# Patient Record
Sex: FEMALE | Race: BLACK OR AFRICAN AMERICAN | NOT HISPANIC OR LATINO
[De-identification: names, ages, dates, MRNs, and addresses within clinical notes are randomized per-mention and may not be internally consistent; named-entity substitution may affect disease eponyms.]

---

## 2018-06-22 ENCOUNTER — APPOINTMENT (OUTPATIENT)
Dept: UROLOGY | Facility: CLINIC | Age: 68
End: 2018-06-22
Payer: MEDICARE

## 2018-06-22 VITALS
TEMPERATURE: 97.8 F | HEART RATE: 72 BPM | RESPIRATION RATE: 16 BRPM | WEIGHT: 220 LBS | BODY MASS INDEX: 40.48 KG/M2 | OXYGEN SATURATION: 99 % | SYSTOLIC BLOOD PRESSURE: 130 MMHG | HEIGHT: 62 IN | DIASTOLIC BLOOD PRESSURE: 100 MMHG

## 2018-06-22 DIAGNOSIS — Z83.3 FAMILY HISTORY OF DIABETES MELLITUS: ICD-10-CM

## 2018-06-22 DIAGNOSIS — Z90.710 ACQUIRED ABSENCE OF BOTH CERVIX AND UTERUS: ICD-10-CM

## 2018-06-22 DIAGNOSIS — Z87.19 PERSONAL HISTORY OF OTHER DISEASES OF THE DIGESTIVE SYSTEM: ICD-10-CM

## 2018-06-22 DIAGNOSIS — Z98.891 HISTORY OF UTERINE SCAR FROM PREVIOUS SURGERY: ICD-10-CM

## 2018-06-22 DIAGNOSIS — Z87.442 PERSONAL HISTORY OF URINARY CALCULI: ICD-10-CM

## 2018-06-22 PROCEDURE — 99204 OFFICE O/P NEW MOD 45 MIN: CPT

## 2018-06-22 RX ORDER — METOPROLOL TARTRATE 50 MG/1
50 TABLET, FILM COATED ORAL
Refills: 0 | Status: ACTIVE | COMMUNITY

## 2018-06-22 RX ORDER — CLOPIDOGREL BISULFATE 75 MG/1
75 TABLET, FILM COATED ORAL
Qty: 90 | Refills: 0 | Status: ACTIVE | COMMUNITY
Start: 2017-12-29

## 2018-06-22 RX ORDER — ASPIRIN 325 MG/1
325 TABLET, FILM COATED ORAL
Refills: 0 | Status: ACTIVE | COMMUNITY

## 2018-06-22 RX ORDER — AMLODIPINE BESYLATE 10 MG/1
10 TABLET ORAL
Qty: 30 | Refills: 0 | Status: ACTIVE | COMMUNITY
Start: 2018-01-13

## 2018-06-22 RX ORDER — ATORVASTATIN CALCIUM 20 MG/1
20 TABLET, FILM COATED ORAL
Qty: 90 | Refills: 0 | Status: ACTIVE | COMMUNITY
Start: 2018-02-08

## 2018-06-22 RX ORDER — CLOPIDOGREL BISULFATE 75 MG/1
75 TABLET, FILM COATED ORAL
Refills: 0 | Status: ACTIVE | COMMUNITY

## 2018-06-22 RX ORDER — MULTIVITAMIN
TABLET ORAL
Refills: 0 | Status: ACTIVE | COMMUNITY

## 2018-06-22 RX ORDER — AMLODIPINE BESYLATE 5 MG/1
5 TABLET ORAL
Refills: 0 | Status: ACTIVE | COMMUNITY

## 2018-07-26 ENCOUNTER — OUTPATIENT (OUTPATIENT)
Dept: OUTPATIENT SERVICES | Facility: HOSPITAL | Age: 68
LOS: 1 days | End: 2018-07-26
Payer: MEDICARE

## 2018-07-26 VITALS
DIASTOLIC BLOOD PRESSURE: 83 MMHG | TEMPERATURE: 98 F | SYSTOLIC BLOOD PRESSURE: 153 MMHG | WEIGHT: 238.98 LBS | HEIGHT: 62 IN | HEART RATE: 73 BPM | OXYGEN SATURATION: 98 % | RESPIRATION RATE: 16 BRPM

## 2018-07-26 DIAGNOSIS — Z95.5 PRESENCE OF CORONARY ANGIOPLASTY IMPLANT AND GRAFT: Chronic | ICD-10-CM

## 2018-07-26 DIAGNOSIS — Z91.040 LATEX ALLERGY STATUS: ICD-10-CM

## 2018-07-26 DIAGNOSIS — Z90.710 ACQUIRED ABSENCE OF BOTH CERVIX AND UTERUS: Chronic | ICD-10-CM

## 2018-07-26 DIAGNOSIS — Z01.818 ENCOUNTER FOR OTHER PREPROCEDURAL EXAMINATION: ICD-10-CM

## 2018-07-26 DIAGNOSIS — I10 ESSENTIAL (PRIMARY) HYPERTENSION: ICD-10-CM

## 2018-07-26 DIAGNOSIS — Z90.49 ACQUIRED ABSENCE OF OTHER SPECIFIED PARTS OF DIGESTIVE TRACT: Chronic | ICD-10-CM

## 2018-07-26 DIAGNOSIS — N20.0 CALCULUS OF KIDNEY: ICD-10-CM

## 2018-07-26 DIAGNOSIS — Z87.442 PERSONAL HISTORY OF URINARY CALCULI: ICD-10-CM

## 2018-07-26 DIAGNOSIS — I25.10 ATHEROSCLEROTIC HEART DISEASE OF NATIVE CORONARY ARTERY WITHOUT ANGINA PECTORIS: ICD-10-CM

## 2018-07-26 DIAGNOSIS — Z53.1 PROCEDURE AND TREATMENT NOT CARRIED OUT BECAUSE OF PATIENT'S DECISION FOR REASONS OF BELIEF AND GROUP PRESSURE: ICD-10-CM

## 2018-07-26 DIAGNOSIS — Z98.891 HISTORY OF UTERINE SCAR FROM PREVIOUS SURGERY: Chronic | ICD-10-CM

## 2018-07-26 DIAGNOSIS — E11.9 TYPE 2 DIABETES MELLITUS WITHOUT COMPLICATIONS: ICD-10-CM

## 2018-07-26 LAB
ANION GAP SERPL CALC-SCNC: 14 MMOL/L — SIGNIFICANT CHANGE UP (ref 5–17)
BUN SERPL-MCNC: 36 MG/DL — HIGH (ref 7–23)
CALCIUM SERPL-MCNC: 10 MG/DL — SIGNIFICANT CHANGE UP (ref 8.4–10.5)
CHLORIDE SERPL-SCNC: 102 MMOL/L — SIGNIFICANT CHANGE UP (ref 96–108)
CO2 SERPL-SCNC: 24 MMOL/L — SIGNIFICANT CHANGE UP (ref 22–31)
CREAT SERPL-MCNC: 1.23 MG/DL — SIGNIFICANT CHANGE UP (ref 0.5–1.3)
GLUCOSE SERPL-MCNC: 143 MG/DL — HIGH (ref 70–99)
HBA1C BLD-MCNC: 6.9 % — HIGH (ref 4–5.6)
HCT VFR BLD CALC: 35.9 % — SIGNIFICANT CHANGE UP (ref 34.5–45)
HGB BLD-MCNC: 11.8 G/DL — SIGNIFICANT CHANGE UP (ref 11.5–15.5)
MCHC RBC-ENTMCNC: 30.3 PG — SIGNIFICANT CHANGE UP (ref 27–34)
MCHC RBC-ENTMCNC: 32.9 GM/DL — SIGNIFICANT CHANGE UP (ref 32–36)
MCV RBC AUTO: 92.1 FL — SIGNIFICANT CHANGE UP (ref 80–100)
PLATELET # BLD AUTO: 432 K/UL — HIGH (ref 150–400)
POTASSIUM SERPL-MCNC: 4 MMOL/L — SIGNIFICANT CHANGE UP (ref 3.5–5.3)
POTASSIUM SERPL-SCNC: 4 MMOL/L — SIGNIFICANT CHANGE UP (ref 3.5–5.3)
RBC # BLD: 3.9 M/UL — SIGNIFICANT CHANGE UP (ref 3.8–5.2)
RBC # FLD: 13.5 % — SIGNIFICANT CHANGE UP (ref 10.3–14.5)
SODIUM SERPL-SCNC: 140 MMOL/L — SIGNIFICANT CHANGE UP (ref 135–145)
WBC # BLD: 8.39 K/UL — SIGNIFICANT CHANGE UP (ref 3.8–10.5)
WBC # FLD AUTO: 8.39 K/UL — SIGNIFICANT CHANGE UP (ref 3.8–10.5)

## 2018-07-26 NOTE — H&P PST ADULT - PRIMARY CARE PROVIDER
[facility] ,[phone],Cardiologist DeCorby fraser [phone] In [facility] ,[phone]

## 2018-07-26 NOTE — H&P PST ADULT - PROBLEM SELECTOR PLAN 1
planned for right ureteroscopy, laser lithotripsy, stent insertion 8/2/18.  PST labs, Ucx send  Preprocedure instructions discussed

## 2018-07-26 NOTE — H&P PST ADULT - PSH
Coronary stent patent  july January 2014 Coronary stent patent  2014  H/O: hysterectomy    History of   x2  S/P laparoscopic cholecystectomy

## 2018-07-26 NOTE — H&P PST ADULT - MUSCULOSKELETAL
negative No joint pain, swelling or deformity; no limitation of movement details… detailed exam no calf tenderness/no joint swelling/no joint erythema/no joint warmth

## 2018-07-26 NOTE — H&P PST ADULT - NSANTHOSAYNRD_GEN_A_CORE
No. TIBURCIO screening performed.  STOP BANG Legend: 0-2 = LOW Risk; 3-4 = INTERMEDIATE Risk; 5-8 = HIGH Risk

## 2018-07-26 NOTE — H&P PST ADULT - PMH
Calculus of kidney    DM2 (diabetes mellitus, type 2)  no meds  Essential hypertension CAD (coronary artery disease)  s/p cardiac stent 2014  Calculus of kidney  b/l  Cardiac murmur    DM2 (diabetes mellitus, type 2)  no meds  Essential hypertension    Latex allergy

## 2018-07-26 NOTE — H&P PST ADULT - HISTORY OF PRESENT ILLNESS
67 year old female with PMH of HTN, CAD s/p cardiac stent 2014, DM2 ( diet controlled) with complaints of hematuria/ back pain found to have bilateral renal stones planned for right ureteroscopy, laser lithotripsy, stent insertion 8/2/18. ( Planned for left PCNL 9/4/18 as per Wilfredo from Dr. Morrissey office, next PST scheduled for 8/28/18).       ***** Patient is [employer], emailed Dr. Morrissey to notify.

## 2018-07-27 PROBLEM — N20.0 CALCULUS OF KIDNEY: Chronic | Status: ACTIVE | Noted: 2018-07-26

## 2018-07-27 LAB
CULTURE RESULTS: NO GROWTH — SIGNIFICANT CHANGE UP
SPECIMEN SOURCE: SIGNIFICANT CHANGE UP

## 2018-08-01 ENCOUNTER — TRANSCRIPTION ENCOUNTER (OUTPATIENT)
Age: 68
End: 2018-08-01

## 2018-08-02 ENCOUNTER — RESULT REVIEW (OUTPATIENT)
Age: 68
End: 2018-08-02

## 2018-08-02 ENCOUNTER — APPOINTMENT (OUTPATIENT)
Dept: UROLOGY | Facility: HOSPITAL | Age: 68
End: 2018-08-02

## 2018-08-02 ENCOUNTER — OUTPATIENT (OUTPATIENT)
Dept: OUTPATIENT SERVICES | Facility: HOSPITAL | Age: 68
LOS: 1 days | Discharge: HOME | End: 2018-08-02
Payer: MEDICARE

## 2018-08-02 VITALS
TEMPERATURE: 97 F | RESPIRATION RATE: 17 BRPM | DIASTOLIC BLOOD PRESSURE: 57 MMHG | HEART RATE: 73 BPM | SYSTOLIC BLOOD PRESSURE: 116 MMHG | OXYGEN SATURATION: 98 %

## 2018-08-02 VITALS
OXYGEN SATURATION: 95 % | HEART RATE: 75 BPM | TEMPERATURE: 98 F | DIASTOLIC BLOOD PRESSURE: 78 MMHG | SYSTOLIC BLOOD PRESSURE: 157 MMHG | WEIGHT: 238.98 LBS | RESPIRATION RATE: 16 BRPM | HEIGHT: 62 IN

## 2018-08-02 DIAGNOSIS — I25.10 ATHEROSCLEROTIC HEART DISEASE OF NATIVE CORONARY ARTERY WITHOUT ANGINA PECTORIS: ICD-10-CM

## 2018-08-02 DIAGNOSIS — Z87.442 PERSONAL HISTORY OF URINARY CALCULI: ICD-10-CM

## 2018-08-02 DIAGNOSIS — Z91.040 LATEX ALLERGY STATUS: ICD-10-CM

## 2018-08-02 DIAGNOSIS — I10 ESSENTIAL (PRIMARY) HYPERTENSION: ICD-10-CM

## 2018-08-02 DIAGNOSIS — N20.0 CALCULUS OF KIDNEY: ICD-10-CM

## 2018-08-02 DIAGNOSIS — Z98.61 CORONARY ANGIOPLASTY STATUS: ICD-10-CM

## 2018-08-02 DIAGNOSIS — Z01.818 ENCOUNTER FOR OTHER PREPROCEDURAL EXAMINATION: ICD-10-CM

## 2018-08-02 DIAGNOSIS — R01.1 CARDIAC MURMUR, UNSPECIFIED: ICD-10-CM

## 2018-08-02 DIAGNOSIS — Z98.891 HISTORY OF UTERINE SCAR FROM PREVIOUS SURGERY: Chronic | ICD-10-CM

## 2018-08-02 DIAGNOSIS — E11.9 TYPE 2 DIABETES MELLITUS WITHOUT COMPLICATIONS: ICD-10-CM

## 2018-08-02 DIAGNOSIS — Z87.891 PERSONAL HISTORY OF NICOTINE DEPENDENCE: ICD-10-CM

## 2018-08-02 DIAGNOSIS — Z90.49 ACQUIRED ABSENCE OF OTHER SPECIFIED PARTS OF DIGESTIVE TRACT: Chronic | ICD-10-CM

## 2018-08-02 DIAGNOSIS — Z91.048 OTHER NONMEDICINAL SUBSTANCE ALLERGY STATUS: ICD-10-CM

## 2018-08-02 DIAGNOSIS — Z90.710 ACQUIRED ABSENCE OF BOTH CERVIX AND UTERUS: Chronic | ICD-10-CM

## 2018-08-02 DIAGNOSIS — Z95.5 PRESENCE OF CORONARY ANGIOPLASTY IMPLANT AND GRAFT: Chronic | ICD-10-CM

## 2018-08-02 DIAGNOSIS — Z91.041 RADIOGRAPHIC DYE ALLERGY STATUS: ICD-10-CM

## 2018-08-02 LAB — GLUCOSE BLDC GLUCOMTR-MCNC: 146 MG/DL — HIGH (ref 70–99)

## 2018-08-02 PROCEDURE — 80048 BASIC METABOLIC PNL TOTAL CA: CPT

## 2018-08-02 PROCEDURE — 74420 UROGRAPHY RTRGR +-KUB: CPT | Mod: 26

## 2018-08-02 PROCEDURE — 83036 HEMOGLOBIN GLYCOSYLATED A1C: CPT

## 2018-08-02 PROCEDURE — G0463: CPT

## 2018-08-02 PROCEDURE — 52353 CYSTOURETERO W/LITHOTRIPSY: CPT | Mod: RT

## 2018-08-02 PROCEDURE — 76000 FLUOROSCOPY <1 HR PHYS/QHP: CPT

## 2018-08-02 PROCEDURE — 85027 COMPLETE CBC AUTOMATED: CPT

## 2018-08-02 PROCEDURE — 88300 SURGICAL PATH GROSS: CPT | Mod: 26

## 2018-08-02 PROCEDURE — 87086 URINE CULTURE/COLONY COUNT: CPT

## 2018-08-02 RX ORDER — SODIUM CHLORIDE 9 MG/ML
1000 INJECTION, SOLUTION INTRAVENOUS
Qty: 0 | Refills: 0 | Status: DISCONTINUED | OUTPATIENT
Start: 2018-08-02 | End: 2018-08-17

## 2018-08-02 RX ORDER — IBUPROFEN 200 MG
600 TABLET ORAL ONCE
Qty: 0 | Refills: 0 | Status: COMPLETED | OUTPATIENT
Start: 2018-08-02 | End: 2018-08-02

## 2018-08-02 RX ORDER — LIDOCAINE HCL 20 MG/ML
0.2 VIAL (ML) INJECTION ONCE
Qty: 0 | Refills: 0 | Status: DISCONTINUED | OUTPATIENT
Start: 2018-08-02 | End: 2018-08-02

## 2018-08-02 RX ORDER — ACETAMINOPHEN 500 MG
1000 TABLET ORAL ONCE
Qty: 0 | Refills: 0 | Status: COMPLETED | OUTPATIENT
Start: 2018-08-02 | End: 2018-08-02

## 2018-08-02 RX ORDER — ONDANSETRON 8 MG/1
4 TABLET, FILM COATED ORAL EVERY 4 HOURS
Qty: 0 | Refills: 0 | Status: DISCONTINUED | OUTPATIENT
Start: 2018-08-02 | End: 2018-08-02

## 2018-08-02 RX ORDER — SODIUM CHLORIDE 9 MG/ML
3 INJECTION INTRAMUSCULAR; INTRAVENOUS; SUBCUTANEOUS EVERY 8 HOURS
Qty: 0 | Refills: 0 | Status: DISCONTINUED | OUTPATIENT
Start: 2018-08-02 | End: 2018-08-02

## 2018-08-02 RX ORDER — FENTANYL CITRATE 50 UG/ML
25 INJECTION INTRAVENOUS
Qty: 0 | Refills: 0 | Status: DISCONTINUED | OUTPATIENT
Start: 2018-08-02 | End: 2018-08-02

## 2018-08-02 RX ORDER — CEPHALEXIN 500 MG
1 CAPSULE ORAL
Qty: 9 | Refills: 0
Start: 2018-08-02 | End: 2018-08-04

## 2018-08-02 RX ORDER — CEFAZOLIN SODIUM 1 G
2000 VIAL (EA) INJECTION ONCE
Qty: 0 | Refills: 0 | Status: DISCONTINUED | OUTPATIENT
Start: 2018-08-02 | End: 2018-08-17

## 2018-08-02 RX ORDER — ATROPA BELLADONNA AND OPIUM 16.2; 6 MG/1; MG/1
1 SUPPOSITORY RECTAL ONCE
Qty: 0 | Refills: 0 | Status: DISCONTINUED | OUTPATIENT
Start: 2018-08-02 | End: 2018-08-02

## 2018-08-02 RX ORDER — MORPHINE SULFATE 50 MG/1
2 CAPSULE, EXTENDED RELEASE ORAL ONCE
Qty: 0 | Refills: 0 | Status: DISCONTINUED | OUTPATIENT
Start: 2018-08-02 | End: 2018-08-02

## 2018-08-02 RX ORDER — KETOROLAC TROMETHAMINE 30 MG/ML
30 SYRINGE (ML) INJECTION ONCE
Qty: 0 | Refills: 0 | Status: DISCONTINUED | OUTPATIENT
Start: 2018-08-02 | End: 2018-08-02

## 2018-08-02 RX ADMIN — ATROPA BELLADONNA AND OPIUM 1 SUPPOSITORY(S): 16.2; 6 SUPPOSITORY RECTAL at 13:15

## 2018-08-02 RX ADMIN — MORPHINE SULFATE 2 MILLIGRAM(S): 50 CAPSULE, EXTENDED RELEASE ORAL at 12:23

## 2018-08-02 RX ADMIN — MORPHINE SULFATE 2 MILLIGRAM(S): 50 CAPSULE, EXTENDED RELEASE ORAL at 12:38

## 2018-08-02 RX ADMIN — ATROPA BELLADONNA AND OPIUM 1 SUPPOSITORY(S): 16.2; 6 SUPPOSITORY RECTAL at 13:02

## 2018-08-02 RX ADMIN — Medication 30 MILLIGRAM(S): at 14:46

## 2018-08-02 RX ADMIN — Medication 600 MILLIGRAM(S): at 17:15

## 2018-08-02 RX ADMIN — Medication 400 MILLIGRAM(S): at 16:30

## 2018-08-02 RX ADMIN — Medication 1000 MILLIGRAM(S): at 17:15

## 2018-08-02 RX ADMIN — FENTANYL CITRATE 25 MICROGRAM(S): 50 INJECTION INTRAVENOUS at 13:10

## 2018-08-02 RX ADMIN — Medication 600 MILLIGRAM(S): at 16:30

## 2018-08-02 RX ADMIN — FENTANYL CITRATE 25 MICROGRAM(S): 50 INJECTION INTRAVENOUS at 12:56

## 2018-08-02 RX ADMIN — Medication 30 MILLIGRAM(S): at 14:35

## 2018-08-02 NOTE — ASU DISCHARGE PLAN (ADULT/PEDIATRIC). - MEDICATION SUMMARY - MEDICATIONS TO TAKE
I will START or STAY ON the medications listed below when I get home from the hospital:    Percocet 5/325 oral tablet  -- 1 tab(s) by mouth every 6 hours, As Needed -for moderate pain - for severe pain MDD:4 tabs   -- Caution federal law prohibits the transfer of this drug to any person other  than the person for whom it was prescribed.  May cause drowsiness.  Alcohol may intensify this effect.  Use care when operating dangerous machinery.  This prescription cannot be refilled.  This product contains acetaminophen.  Do not use  with any other product containing acetaminophen to prevent possible liver damage.  Using more of this medication than prescribed may cause serious breathing problems.    -- Indication: For pain    aspirin 325 mg oral tablet  -- 1 tab(s) by mouth once a day  -- Indication: For CAD    Lipitor 20 mg oral tablet  -- orally once a day (at bedtime)  -- Indication: For HLD    Diovan  mg-25 mg oral tablet  -- 1 tab(s) by mouth once a day  -- Indication: For HTN    Metoprolol Succinate ER 50 mg oral tablet, extended release  -- 1 tab(s) by mouth once a day  -- Indication: For HTN    Norvasc 5 mg oral tablet  -- 1 tab(s) by mouth once a day  -- Indication: For HTN    Keflex 500 mg oral capsule  -- 1 cap(s) by mouth 3 times a day   -- Finish all this medication unless otherwise directed by prescriber.    -- Indication: For prophylaxis    Centrum oral tablet  -- 1 tab(s) by mouth once a day  -- Indication: For daily health    B Complex 50 oral tablet, extended release  -- 1 tab(s) by mouth once a day  -- Indication: For daily health

## 2018-08-02 NOTE — ASU DISCHARGE PLAN (ADULT/PEDIATRIC). - NOTIFY
Fever greater than 101/Persistent Nausea and Vomiting/Inability to Tolerate Liquids or Foods/Unable to Urinate/Pain not relieved by Medications/Bleeding that does not stop

## 2018-08-02 NOTE — BRIEF OPERATIVE NOTE - PROCEDURE
<<-----Click on this checkbox to enter Procedure Cystoscopy with insertion of stent if indicated, with one or more of retrograde pyelography, ureteroscopy, holmium laser lithotripsy, and basket extraction of calculus  08/02/2018    Active  [id number]

## 2018-08-02 NOTE — ASU DISCHARGE PLAN (ADULT/PEDIATRIC). - SPECIAL INSTRUCTIONS
In order to better understand your post-operative pain control, please record the following for follow-up review:          (1) Date and time of pain         (2) Level of pain (Scale of 1-10)         (3) Medication and dose taken          (4) Level of pain (1-10) after medication taken   You may receive a phone call in order to assess if you are being appropriately treated or if there are medication adjustments you may need to better control your pain.

## 2018-08-03 PROCEDURE — 76000 FLUOROSCOPY <1 HR PHYS/QHP: CPT

## 2018-08-03 PROCEDURE — 82962 GLUCOSE BLOOD TEST: CPT

## 2018-08-03 PROCEDURE — 52353 CYSTOURETERO W/LITHOTRIPSY: CPT | Mod: RT

## 2018-08-03 PROCEDURE — 82365 CALCULUS SPECTROSCOPY: CPT

## 2018-08-03 PROCEDURE — 74420 UROGRAPHY RTRGR +-KUB: CPT

## 2018-08-03 PROCEDURE — C1889: CPT

## 2018-08-03 PROCEDURE — 88300 SURGICAL PATH GROSS: CPT

## 2018-08-08 LAB — COMPN STONE: SIGNIFICANT CHANGE UP

## 2018-08-23 PROBLEM — R01.1 CARDIAC MURMUR, UNSPECIFIED: Chronic | Status: ACTIVE | Noted: 2018-07-26

## 2018-08-23 PROBLEM — Z91.040 LATEX ALLERGY STATUS: Chronic | Status: ACTIVE | Noted: 2018-07-26

## 2018-08-28 ENCOUNTER — OUTPATIENT (OUTPATIENT)
Dept: OUTPATIENT SERVICES | Facility: HOSPITAL | Age: 68
LOS: 1 days | End: 2018-08-28
Payer: MEDICARE

## 2018-08-28 VITALS
HEART RATE: 71 BPM | HEIGHT: 62 IN | TEMPERATURE: 98 F | OXYGEN SATURATION: 98 % | SYSTOLIC BLOOD PRESSURE: 138 MMHG | RESPIRATION RATE: 16 BRPM | WEIGHT: 231.93 LBS | DIASTOLIC BLOOD PRESSURE: 74 MMHG

## 2018-08-28 DIAGNOSIS — N20.0 CALCULUS OF KIDNEY: ICD-10-CM

## 2018-08-28 DIAGNOSIS — Z78.9 OTHER SPECIFIED HEALTH STATUS: ICD-10-CM

## 2018-08-28 DIAGNOSIS — E11.9 TYPE 2 DIABETES MELLITUS WITHOUT COMPLICATIONS: ICD-10-CM

## 2018-08-28 DIAGNOSIS — Z98.890 OTHER SPECIFIED POSTPROCEDURAL STATES: Chronic | ICD-10-CM

## 2018-08-28 DIAGNOSIS — Z95.5 PRESENCE OF CORONARY ANGIOPLASTY IMPLANT AND GRAFT: Chronic | ICD-10-CM

## 2018-08-28 DIAGNOSIS — I10 ESSENTIAL (PRIMARY) HYPERTENSION: ICD-10-CM

## 2018-08-28 DIAGNOSIS — Z87.442 PERSONAL HISTORY OF URINARY CALCULI: ICD-10-CM

## 2018-08-28 DIAGNOSIS — Z90.710 ACQUIRED ABSENCE OF BOTH CERVIX AND UTERUS: Chronic | ICD-10-CM

## 2018-08-28 DIAGNOSIS — Z95.5 PRESENCE OF CORONARY ANGIOPLASTY IMPLANT AND GRAFT: ICD-10-CM

## 2018-08-28 DIAGNOSIS — Z90.49 ACQUIRED ABSENCE OF OTHER SPECIFIED PARTS OF DIGESTIVE TRACT: Chronic | ICD-10-CM

## 2018-08-28 DIAGNOSIS — Z98.891 HISTORY OF UTERINE SCAR FROM PREVIOUS SURGERY: Chronic | ICD-10-CM

## 2018-08-28 DIAGNOSIS — Z01.818 ENCOUNTER FOR OTHER PREPROCEDURAL EXAMINATION: ICD-10-CM

## 2018-08-28 LAB
ANION GAP SERPL CALC-SCNC: 15 MMOL/L — SIGNIFICANT CHANGE UP (ref 5–17)
BLD GP AB SCN SERPL QL: NEGATIVE — SIGNIFICANT CHANGE UP
BUN SERPL-MCNC: 32 MG/DL — HIGH (ref 7–23)
CALCIUM SERPL-MCNC: 10.3 MG/DL — SIGNIFICANT CHANGE UP (ref 8.4–10.5)
CHLORIDE SERPL-SCNC: 100 MMOL/L — SIGNIFICANT CHANGE UP (ref 96–108)
CO2 SERPL-SCNC: 25 MMOL/L — SIGNIFICANT CHANGE UP (ref 22–31)
CREAT SERPL-MCNC: 1.43 MG/DL — HIGH (ref 0.5–1.3)
GLUCOSE SERPL-MCNC: 137 MG/DL — HIGH (ref 70–99)
HBA1C BLD-MCNC: 7 % — HIGH (ref 4–5.6)
HCT VFR BLD CALC: 36.5 % — SIGNIFICANT CHANGE UP (ref 34.5–45)
HGB BLD-MCNC: 12.1 G/DL — SIGNIFICANT CHANGE UP (ref 11.5–15.5)
MCHC RBC-ENTMCNC: 31.1 PG — SIGNIFICANT CHANGE UP (ref 27–34)
MCHC RBC-ENTMCNC: 33.2 GM/DL — SIGNIFICANT CHANGE UP (ref 32–36)
MCV RBC AUTO: 93.8 FL — SIGNIFICANT CHANGE UP (ref 80–100)
PLATELET # BLD AUTO: 399 K/UL — SIGNIFICANT CHANGE UP (ref 150–400)
POTASSIUM SERPL-MCNC: 3.8 MMOL/L — SIGNIFICANT CHANGE UP (ref 3.5–5.3)
POTASSIUM SERPL-SCNC: 3.8 MMOL/L — SIGNIFICANT CHANGE UP (ref 3.5–5.3)
RBC # BLD: 3.89 M/UL — SIGNIFICANT CHANGE UP (ref 3.8–5.2)
RBC # FLD: 13.6 % — SIGNIFICANT CHANGE UP (ref 10.3–14.5)
RH IG SCN BLD-IMP: POSITIVE — SIGNIFICANT CHANGE UP
SODIUM SERPL-SCNC: 140 MMOL/L — SIGNIFICANT CHANGE UP (ref 135–145)
WBC # BLD: 6.56 K/UL — SIGNIFICANT CHANGE UP (ref 3.8–10.5)
WBC # FLD AUTO: 6.56 K/UL — SIGNIFICANT CHANGE UP (ref 3.8–10.5)

## 2018-08-28 PROCEDURE — G0463: CPT

## 2018-08-28 PROCEDURE — 86901 BLOOD TYPING SEROLOGIC RH(D): CPT

## 2018-08-28 PROCEDURE — 83036 HEMOGLOBIN GLYCOSYLATED A1C: CPT

## 2018-08-28 PROCEDURE — 86900 BLOOD TYPING SEROLOGIC ABO: CPT

## 2018-08-28 PROCEDURE — 80048 BASIC METABOLIC PNL TOTAL CA: CPT

## 2018-08-28 PROCEDURE — 86850 RBC ANTIBODY SCREEN: CPT

## 2018-08-28 PROCEDURE — 87086 URINE CULTURE/COLONY COUNT: CPT

## 2018-08-28 PROCEDURE — 85027 COMPLETE CBC AUTOMATED: CPT

## 2018-08-28 RX ORDER — CEFAZOLIN SODIUM 1 G
2000 VIAL (EA) INJECTION ONCE
Qty: 0 | Refills: 0 | Status: COMPLETED | OUTPATIENT
Start: 2018-09-04 | End: 2018-09-04

## 2018-08-28 RX ORDER — SODIUM CHLORIDE 9 MG/ML
3 INJECTION INTRAMUSCULAR; INTRAVENOUS; SUBCUTANEOUS EVERY 8 HOURS
Qty: 0 | Refills: 0 | Status: DISCONTINUED | OUTPATIENT
Start: 2018-09-04 | End: 2018-09-04

## 2018-08-28 NOTE — H&P PST ADULT - PSH
Coronary stent patent  2014   LOY X1  H/O: hysterectomy  1988    MITCHELL and unilateral SO ( benign)  History of   x2     and   S/P laparoscopic cholecystectomy  2001 Coronary stent patent  2014   LOY X1  H/O lithotripsy  18  Right Ureteroscopy/ Laser Lithotripsy/ Stent Insertion  H/O: hysterectomy      MITCHELL and unilateral SO ( benign)  History of   x2     and   S/P laparoscopic cholecystectomy  2001

## 2018-08-28 NOTE — H&P PST ADULT - PROBLEM SELECTOR PLAN 5
s/p ( '15): Coronary(LOY) X1. currently on  mg. daily  PLAN: remain on ASA for coronary stent protection

## 2018-08-28 NOTE — H&P PST ADULT - PMH
CAD (coronary artery disease)  s/p cardiac stent 2014 X 1 ( LOY) @   Calculus of kidney  b/l  Cardiac murmur    DM2 (diabetes mellitus, type 2)  Off Metformin since '2017. Now diet- managed  Fibroid, uterine  ' 88   surgically treated; benign  Heart murmur  mild  Hyperlipidemia    Hypertension    Latex allergy CAD (coronary artery disease)  s/p cardiac stent 2014 X 1 ( LOY) @ [facility]  Calculus of kidney  b/l  Cardiac murmur    Carotid artery plaque, bilateral  mild  DM2 (diabetes mellitus, type 2)  Off Metformin since '2017. Now diet- managed  Fibroid, uterine  ' 88   surgically treated; benign  Heart murmur  mild  Hyperlipidemia    Hypertension    Latex allergy    Morbid obesity  BMI  42.4

## 2018-08-28 NOTE — H&P PST ADULT - HISTORY OF PRESENT ILLNESS
67 year old female with PMH of HTN, CAD s/p cardiac stent 2014, DM2 ( diet controlled) with complaints of hematuria/ back pain found to have bilateral renal stones planned for right ureteroscopy, laser lithotripsy, stent insertion 8/2/18. ( Planned for left PCNL 9/4/18 as per Wilfredo from Dr. Morrissey office, next PST scheduled for 8/28/18).       ***** Patient is [employer], emailed Dr. Morrissey to notify. This is a 69 y/o female with PMH: Morbid Obesity: BMI 42.4, HTN, HLD, Type 2 Diabetes: currently diet-managed, CAD: (s/p)'15: Coronary ( LOY) X1: currently on  mg. daily * to remain on ASA for coronary Stent protection, bilateral Kidney stones: (s/p): 8-2-18:  right ureteroscopy, laser lithotripsy, stent insertion. Curren dx: Left Kidney stone. Scheduled: Left Percutaneous Nephrostolithotomy.       ***** Patient is [employer], emailed Dr. Morrissey to notify.

## 2018-08-29 PROBLEM — I10 ESSENTIAL (PRIMARY) HYPERTENSION: Chronic | Status: INACTIVE | Noted: 2018-07-26 | Resolved: 2018-08-28

## 2018-08-29 PROBLEM — I10 ESSENTIAL (PRIMARY) HYPERTENSION: Chronic | Status: ACTIVE | Noted: 2018-08-28

## 2018-08-29 PROBLEM — E78.5 HYPERLIPIDEMIA, UNSPECIFIED: Chronic | Status: ACTIVE | Noted: 2018-08-28

## 2018-08-29 PROBLEM — I25.10 ATHEROSCLEROTIC HEART DISEASE OF NATIVE CORONARY ARTERY WITHOUT ANGINA PECTORIS: Chronic | Status: ACTIVE | Noted: 2018-07-26

## 2018-08-29 PROBLEM — D25.9 LEIOMYOMA OF UTERUS, UNSPECIFIED: Chronic | Status: ACTIVE | Noted: 2018-08-28

## 2018-08-29 PROBLEM — E11.9 TYPE 2 DIABETES MELLITUS WITHOUT COMPLICATIONS: Chronic | Status: ACTIVE | Noted: 2018-07-26

## 2018-08-29 LAB
CULTURE RESULTS: NO GROWTH — SIGNIFICANT CHANGE UP
SPECIMEN SOURCE: SIGNIFICANT CHANGE UP

## 2018-09-03 ENCOUNTER — TRANSCRIPTION ENCOUNTER (OUTPATIENT)
Age: 68
End: 2018-09-03

## 2018-09-04 ENCOUNTER — RESULT REVIEW (OUTPATIENT)
Age: 68
End: 2018-09-04

## 2018-09-04 ENCOUNTER — APPOINTMENT (OUTPATIENT)
Dept: UROLOGY | Facility: HOSPITAL | Age: 68
End: 2018-09-04

## 2018-09-04 ENCOUNTER — INPATIENT (INPATIENT)
Facility: HOSPITAL | Age: 68
LOS: 1 days | Discharge: HOME | DRG: 660 | End: 2018-09-06
Attending: UROLOGY | Admitting: UROLOGY
Payer: MEDICARE

## 2018-09-04 VITALS
SYSTOLIC BLOOD PRESSURE: 152 MMHG | HEART RATE: 69 BPM | HEIGHT: 62 IN | DIASTOLIC BLOOD PRESSURE: 95 MMHG | TEMPERATURE: 98 F | OXYGEN SATURATION: 98 % | RESPIRATION RATE: 18 BRPM | WEIGHT: 231.93 LBS

## 2018-09-04 DIAGNOSIS — Z87.442 PERSONAL HISTORY OF URINARY CALCULI: ICD-10-CM

## 2018-09-04 DIAGNOSIS — Z90.49 ACQUIRED ABSENCE OF OTHER SPECIFIED PARTS OF DIGESTIVE TRACT: Chronic | ICD-10-CM

## 2018-09-04 DIAGNOSIS — Z98.891 HISTORY OF UTERINE SCAR FROM PREVIOUS SURGERY: Chronic | ICD-10-CM

## 2018-09-04 DIAGNOSIS — Z90.710 ACQUIRED ABSENCE OF BOTH CERVIX AND UTERUS: Chronic | ICD-10-CM

## 2018-09-04 DIAGNOSIS — Z01.818 ENCOUNTER FOR OTHER PREPROCEDURAL EXAMINATION: ICD-10-CM

## 2018-09-04 DIAGNOSIS — Z95.5 PRESENCE OF CORONARY ANGIOPLASTY IMPLANT AND GRAFT: Chronic | ICD-10-CM

## 2018-09-04 DIAGNOSIS — Z98.890 OTHER SPECIFIED POSTPROCEDURAL STATES: Chronic | ICD-10-CM

## 2018-09-04 LAB
ANION GAP SERPL CALC-SCNC: 14 MMOL/L — SIGNIFICANT CHANGE UP (ref 5–17)
BASOPHILS # BLD AUTO: 0.1 K/UL — SIGNIFICANT CHANGE UP (ref 0–0.2)
BASOPHILS NFR BLD AUTO: 1 % — SIGNIFICANT CHANGE UP (ref 0–2)
BUN SERPL-MCNC: 26 MG/DL — HIGH (ref 7–23)
CALCIUM SERPL-MCNC: 9.4 MG/DL — SIGNIFICANT CHANGE UP (ref 8.4–10.5)
CHLORIDE SERPL-SCNC: 99 MMOL/L — SIGNIFICANT CHANGE UP (ref 96–108)
CO2 SERPL-SCNC: 25 MMOL/L — SIGNIFICANT CHANGE UP (ref 22–31)
CREAT SERPL-MCNC: 1.31 MG/DL — HIGH (ref 0.5–1.3)
EOSINOPHIL # BLD AUTO: 0.4 K/UL — SIGNIFICANT CHANGE UP (ref 0–0.5)
EOSINOPHIL NFR BLD AUTO: 3.9 % — SIGNIFICANT CHANGE UP (ref 0–6)
GLUCOSE BLDC GLUCOMTR-MCNC: 123 MG/DL — HIGH (ref 70–99)
GLUCOSE SERPL-MCNC: 159 MG/DL — HIGH (ref 70–99)
HCT VFR BLD CALC: 32.2 % — LOW (ref 34.5–45)
HGB BLD-MCNC: 10.8 G/DL — LOW (ref 11.5–15.5)
LYMPHOCYTES # BLD AUTO: 3.2 K/UL — SIGNIFICANT CHANGE UP (ref 1–3.3)
LYMPHOCYTES # BLD AUTO: 30.7 % — SIGNIFICANT CHANGE UP (ref 13–44)
MCHC RBC-ENTMCNC: 31.1 PG — SIGNIFICANT CHANGE UP (ref 27–34)
MCHC RBC-ENTMCNC: 33.7 GM/DL — SIGNIFICANT CHANGE UP (ref 32–36)
MCV RBC AUTO: 92.3 FL — SIGNIFICANT CHANGE UP (ref 80–100)
MONOCYTES # BLD AUTO: 0.6 K/UL — SIGNIFICANT CHANGE UP (ref 0–0.9)
MONOCYTES NFR BLD AUTO: 5.7 % — SIGNIFICANT CHANGE UP (ref 2–14)
NEUTROPHILS # BLD AUTO: 6.1 K/UL — SIGNIFICANT CHANGE UP (ref 1.8–7.4)
NEUTROPHILS NFR BLD AUTO: 58.7 % — SIGNIFICANT CHANGE UP (ref 43–77)
PLATELET # BLD AUTO: 323 K/UL — SIGNIFICANT CHANGE UP (ref 150–400)
POTASSIUM SERPL-MCNC: 4 MMOL/L — SIGNIFICANT CHANGE UP (ref 3.5–5.3)
POTASSIUM SERPL-SCNC: 4 MMOL/L — SIGNIFICANT CHANGE UP (ref 3.5–5.3)
RBC # BLD: 3.49 M/UL — LOW (ref 3.8–5.2)
RBC # FLD: 11.9 % — SIGNIFICANT CHANGE UP (ref 10.3–14.5)
SODIUM SERPL-SCNC: 138 MMOL/L — SIGNIFICANT CHANGE UP (ref 135–145)
WBC # BLD: 10.4 K/UL — SIGNIFICANT CHANGE UP (ref 3.8–10.5)
WBC # FLD AUTO: 10.4 K/UL — SIGNIFICANT CHANGE UP (ref 3.8–10.5)

## 2018-09-04 PROCEDURE — 88300 SURGICAL PATH GROSS: CPT | Mod: 26

## 2018-09-04 RX ORDER — OXYBUTYNIN CHLORIDE 5 MG
5 TABLET ORAL ONCE
Qty: 0 | Refills: 0 | Status: COMPLETED | OUTPATIENT
Start: 2018-09-04 | End: 2018-09-04

## 2018-09-04 RX ORDER — CEFAZOLIN SODIUM 1 G
1000 VIAL (EA) INJECTION EVERY 8 HOURS
Qty: 0 | Refills: 0 | Status: DISCONTINUED | OUTPATIENT
Start: 2018-09-04 | End: 2018-09-06

## 2018-09-04 RX ORDER — ASPIRIN/CALCIUM CARB/MAGNESIUM 324 MG
325 TABLET ORAL ONCE
Qty: 0 | Refills: 0 | Status: COMPLETED | OUTPATIENT
Start: 2018-09-04 | End: 2018-09-04

## 2018-09-04 RX ORDER — HYDROMORPHONE HYDROCHLORIDE 2 MG/ML
0.5 INJECTION INTRAMUSCULAR; INTRAVENOUS; SUBCUTANEOUS EVERY 4 HOURS
Qty: 0 | Refills: 0 | Status: DISCONTINUED | OUTPATIENT
Start: 2018-09-04 | End: 2018-09-05

## 2018-09-04 RX ORDER — ONDANSETRON 8 MG/1
4 TABLET, FILM COATED ORAL ONCE
Qty: 0 | Refills: 0 | Status: DISCONTINUED | OUTPATIENT
Start: 2018-09-04 | End: 2018-09-04

## 2018-09-04 RX ORDER — HEPARIN SODIUM 5000 [USP'U]/ML
5000 INJECTION INTRAVENOUS; SUBCUTANEOUS EVERY 8 HOURS
Qty: 0 | Refills: 0 | Status: DISCONTINUED | OUTPATIENT
Start: 2018-09-04 | End: 2018-09-06

## 2018-09-04 RX ORDER — SODIUM CHLORIDE 9 MG/ML
1000 INJECTION, SOLUTION INTRAVENOUS
Qty: 0 | Refills: 0 | Status: DISCONTINUED | OUTPATIENT
Start: 2018-09-04 | End: 2018-09-05

## 2018-09-04 RX ORDER — OXYCODONE AND ACETAMINOPHEN 5; 325 MG/1; MG/1
1 TABLET ORAL EVERY 4 HOURS
Qty: 0 | Refills: 0 | Status: DISCONTINUED | OUTPATIENT
Start: 2018-09-04 | End: 2018-09-06

## 2018-09-04 RX ORDER — HYDROCHLOROTHIAZIDE 25 MG
25 TABLET ORAL DAILY
Qty: 0 | Refills: 0 | Status: DISCONTINUED | OUTPATIENT
Start: 2018-09-04 | End: 2018-09-06

## 2018-09-04 RX ORDER — ATROPA BELLADONNA AND OPIUM 16.2; 6 MG/1; MG/1
1 SUPPOSITORY RECTAL ONCE
Qty: 0 | Refills: 0 | Status: DISCONTINUED | OUTPATIENT
Start: 2018-09-04 | End: 2018-09-04

## 2018-09-04 RX ORDER — HYDROMORPHONE HYDROCHLORIDE 2 MG/ML
0.25 INJECTION INTRAMUSCULAR; INTRAVENOUS; SUBCUTANEOUS
Qty: 0 | Refills: 0 | Status: DISCONTINUED | OUTPATIENT
Start: 2018-09-04 | End: 2018-09-04

## 2018-09-04 RX ORDER — ASPIRIN/CALCIUM CARB/MAGNESIUM 324 MG
325 TABLET ORAL DAILY
Qty: 0 | Refills: 0 | Status: DISCONTINUED | OUTPATIENT
Start: 2018-09-04 | End: 2018-09-06

## 2018-09-04 RX ORDER — METOPROLOL TARTRATE 50 MG
50 TABLET ORAL DAILY
Qty: 0 | Refills: 0 | Status: DISCONTINUED | OUTPATIENT
Start: 2018-09-04 | End: 2018-09-06

## 2018-09-04 RX ORDER — ACETAMINOPHEN 500 MG
500 TABLET ORAL EVERY 6 HOURS
Qty: 0 | Refills: 0 | Status: DISCONTINUED | OUTPATIENT
Start: 2018-09-04 | End: 2018-09-06

## 2018-09-04 RX ORDER — VALSARTAN 80 MG/1
320 TABLET ORAL DAILY
Qty: 0 | Refills: 0 | Status: DISCONTINUED | OUTPATIENT
Start: 2018-09-04 | End: 2018-09-06

## 2018-09-04 RX ORDER — HYDROMORPHONE HYDROCHLORIDE 2 MG/ML
0.5 INJECTION INTRAMUSCULAR; INTRAVENOUS; SUBCUTANEOUS ONCE
Qty: 0 | Refills: 0 | Status: DISCONTINUED | OUTPATIENT
Start: 2018-09-04 | End: 2018-09-04

## 2018-09-04 RX ORDER — OXYCODONE AND ACETAMINOPHEN 5; 325 MG/1; MG/1
2 TABLET ORAL EVERY 6 HOURS
Qty: 0 | Refills: 0 | Status: DISCONTINUED | OUTPATIENT
Start: 2018-09-04 | End: 2018-09-06

## 2018-09-04 RX ORDER — ATORVASTATIN CALCIUM 80 MG/1
20 TABLET, FILM COATED ORAL AT BEDTIME
Qty: 0 | Refills: 0 | Status: DISCONTINUED | OUTPATIENT
Start: 2018-09-04 | End: 2018-09-06

## 2018-09-04 RX ORDER — SENNA PLUS 8.6 MG/1
2 TABLET ORAL AT BEDTIME
Qty: 0 | Refills: 0 | Status: DISCONTINUED | OUTPATIENT
Start: 2018-09-04 | End: 2018-09-06

## 2018-09-04 RX ORDER — AMLODIPINE BESYLATE 2.5 MG/1
5 TABLET ORAL DAILY
Qty: 0 | Refills: 0 | Status: DISCONTINUED | OUTPATIENT
Start: 2018-09-04 | End: 2018-09-06

## 2018-09-04 RX ADMIN — ATORVASTATIN CALCIUM 20 MILLIGRAM(S): 80 TABLET, FILM COATED ORAL at 22:46

## 2018-09-04 RX ADMIN — Medication 100 MILLIGRAM(S): at 08:50

## 2018-09-04 RX ADMIN — HYDROMORPHONE HYDROCHLORIDE 0.5 MILLIGRAM(S): 2 INJECTION INTRAMUSCULAR; INTRAVENOUS; SUBCUTANEOUS at 20:20

## 2018-09-04 RX ADMIN — Medication 5 MILLIGRAM(S): at 15:08

## 2018-09-04 RX ADMIN — SODIUM CHLORIDE 125 MILLILITER(S): 9 INJECTION, SOLUTION INTRAVENOUS at 11:42

## 2018-09-04 RX ADMIN — HEPARIN SODIUM 5000 UNIT(S): 5000 INJECTION INTRAVENOUS; SUBCUTANEOUS at 22:46

## 2018-09-04 RX ADMIN — ATROPA BELLADONNA AND OPIUM 1 SUPPOSITORY(S): 16.2; 6 SUPPOSITORY RECTAL at 13:25

## 2018-09-04 RX ADMIN — Medication 100 MILLIGRAM(S): at 16:02

## 2018-09-04 RX ADMIN — Medication 325 MILLIGRAM(S): at 08:30

## 2018-09-04 RX ADMIN — OXYCODONE AND ACETAMINOPHEN 1 TABLET(S): 5; 325 TABLET ORAL at 13:59

## 2018-09-04 RX ADMIN — OXYCODONE AND ACETAMINOPHEN 1 TABLET(S): 5; 325 TABLET ORAL at 15:00

## 2018-09-04 RX ADMIN — HYDROMORPHONE HYDROCHLORIDE 0.5 MILLIGRAM(S): 2 INJECTION INTRAMUSCULAR; INTRAVENOUS; SUBCUTANEOUS at 16:15

## 2018-09-04 RX ADMIN — HYDROMORPHONE HYDROCHLORIDE 0.5 MILLIGRAM(S): 2 INJECTION INTRAMUSCULAR; INTRAVENOUS; SUBCUTANEOUS at 19:50

## 2018-09-04 RX ADMIN — HEPARIN SODIUM 5000 UNIT(S): 5000 INJECTION INTRAVENOUS; SUBCUTANEOUS at 14:03

## 2018-09-04 RX ADMIN — Medication 100 MILLIGRAM(S): at 22:45

## 2018-09-04 RX ADMIN — HYDROMORPHONE HYDROCHLORIDE 0.5 MILLIGRAM(S): 2 INJECTION INTRAMUSCULAR; INTRAVENOUS; SUBCUTANEOUS at 16:02

## 2018-09-04 RX ADMIN — ATROPA BELLADONNA AND OPIUM 1 SUPPOSITORY(S): 16.2; 6 SUPPOSITORY RECTAL at 14:03

## 2018-09-04 NOTE — BRIEF OPERATIVE NOTE - PROCEDURE
<<-----Click on this checkbox to enter Procedure Cystoscopy  09/04/2018  with left ureteral catheter insertion, retrograde pyelogram  Active  [email or web address]  Percutaneous nephrolithotomy  09/04/2018  left, antegrade nephrostogram, placement of re-entry nephroureteral catheter  Active  [id number]

## 2018-09-04 NOTE — PROGRESS NOTE ADULT - SUBJECTIVE AND OBJECTIVE BOX
Patient is high-risk for bleeding and sepsis based on status as Quaker and adverse to blood transfusion as well as frailty (R54) and obesity (E66.01). Patient to be admitted for monitoring.

## 2018-09-04 NOTE — PROGRESS NOTE ADULT - SUBJECTIVE AND OBJECTIVE BOX
Post op Check    Pt seen and examined without any major complaints. minor suprapubic discomfort, but Pain is controlled. Denies SOB/CP/N/V.     Vital Signs Last 24 Hrs  T(C): 36.4 (04 Sep 2018 12:00), Max: 36.6 (04 Sep 2018 07:07)  T(F): 97.5 (04 Sep 2018 12:00), Max: 97.9 (04 Sep 2018 07:07)  HR: 61 (04 Sep 2018 12:00) (60 - 81)  BP: 139/66 (04 Sep 2018 12:00) (133/56 - 152/95)  BP(mean): 95 (04 Sep 2018 12:00) (77 - 95)  RR: 15 (04 Sep 2018 12:00) (10 - 19)  SpO2: 98% (04 Sep 2018 12:00) (97% - 100%)    I&O's Summary    Beltran- 150cc    Physical Exam  Gen: NAD, A&Ox3  Pulm: No respiratory distress, no subcostal retractions  CV: RRR, no JVD  Abd: Soft, ND, mild suprapubic TTP  Back: left flank dressing CDI, no ecchymosis or hematoma, appropriate ttp, L NT clamped  : +beltran draining light pink                           10.8   10.4  )-----------( 323      ( 04 Sep 2018 10:39 )             32.2       09-04    138  |  99  |  26<H>  ----------------------------<  159<H>  4.0   |  25  |  1.31<H>    Ca    9.4      04 Sep 2018 10:39

## 2018-09-04 NOTE — PROGRESS NOTE ADULT - ASSESSMENT
A/P: 68y Female s/p L PCNL  DVT prophylaxis/OOB  Incentive spirometry  Strict I&O's via NT and beltran   Analgesia and antiemetics as needed  regular Diet  AM labs  CT and tov in am  2 day plan for NT

## 2018-09-05 LAB
ANION GAP SERPL CALC-SCNC: 15 MMOL/L — SIGNIFICANT CHANGE UP (ref 5–17)
BASOPHILS # BLD AUTO: 0.3 K/UL — HIGH (ref 0–0.2)
BASOPHILS NFR BLD AUTO: 1.9 % — SIGNIFICANT CHANGE UP (ref 0–2)
BUN SERPL-MCNC: 25 MG/DL — HIGH (ref 7–23)
CALCIUM SERPL-MCNC: 9.2 MG/DL — SIGNIFICANT CHANGE UP (ref 8.4–10.5)
CHLORIDE SERPL-SCNC: 98 MMOL/L — SIGNIFICANT CHANGE UP (ref 96–108)
CO2 SERPL-SCNC: 23 MMOL/L — SIGNIFICANT CHANGE UP (ref 22–31)
CREAT SERPL-MCNC: 1.55 MG/DL — HIGH (ref 0.5–1.3)
EOSINOPHIL # BLD AUTO: 0 K/UL — SIGNIFICANT CHANGE UP (ref 0–0.5)
EOSINOPHIL NFR BLD AUTO: 0.2 % — SIGNIFICANT CHANGE UP (ref 0–6)
GLUCOSE SERPL-MCNC: 150 MG/DL — HIGH (ref 70–99)
HCT VFR BLD CALC: 32.1 % — LOW (ref 34.5–45)
HGB BLD-MCNC: 10.8 G/DL — LOW (ref 11.5–15.5)
LYMPHOCYTES # BLD AUTO: 1.2 K/UL — SIGNIFICANT CHANGE UP (ref 1–3.3)
LYMPHOCYTES # BLD AUTO: 8.6 % — LOW (ref 13–44)
MCHC RBC-ENTMCNC: 30.8 PG — SIGNIFICANT CHANGE UP (ref 27–34)
MCHC RBC-ENTMCNC: 33.7 GM/DL — SIGNIFICANT CHANGE UP (ref 32–36)
MCV RBC AUTO: 91.6 FL — SIGNIFICANT CHANGE UP (ref 80–100)
MONOCYTES # BLD AUTO: 1.3 K/UL — HIGH (ref 0–0.9)
MONOCYTES NFR BLD AUTO: 9.5 % — SIGNIFICANT CHANGE UP (ref 2–14)
NEUTROPHILS # BLD AUTO: 10.7 K/UL — HIGH (ref 1.8–7.4)
NEUTROPHILS NFR BLD AUTO: 79.8 % — HIGH (ref 43–77)
PLATELET # BLD AUTO: 376 K/UL — SIGNIFICANT CHANGE UP (ref 150–400)
POTASSIUM SERPL-MCNC: 4.5 MMOL/L — SIGNIFICANT CHANGE UP (ref 3.5–5.3)
POTASSIUM SERPL-SCNC: 4.5 MMOL/L — SIGNIFICANT CHANGE UP (ref 3.5–5.3)
RBC # BLD: 3.5 M/UL — LOW (ref 3.8–5.2)
RBC # FLD: 12.2 % — SIGNIFICANT CHANGE UP (ref 10.3–14.5)
SODIUM SERPL-SCNC: 136 MMOL/L — SIGNIFICANT CHANGE UP (ref 135–145)
WBC # BLD: 13.4 K/UL — HIGH (ref 3.8–10.5)
WBC # FLD AUTO: 13.4 K/UL — HIGH (ref 3.8–10.5)

## 2018-09-05 PROCEDURE — 74176 CT ABD & PELVIS W/O CONTRAST: CPT | Mod: 26

## 2018-09-05 RX ORDER — ACETAMINOPHEN 500 MG
1000 TABLET ORAL ONCE
Qty: 0 | Refills: 0 | Status: COMPLETED | OUTPATIENT
Start: 2018-09-05 | End: 2018-09-05

## 2018-09-05 RX ORDER — SODIUM CHLORIDE 9 MG/ML
1000 INJECTION, SOLUTION INTRAVENOUS
Qty: 0 | Refills: 0 | Status: DISCONTINUED | OUTPATIENT
Start: 2018-09-05 | End: 2018-09-06

## 2018-09-05 RX ADMIN — Medication 500 MILLIGRAM(S): at 16:55

## 2018-09-05 RX ADMIN — HYDROMORPHONE HYDROCHLORIDE 0.5 MILLIGRAM(S): 2 INJECTION INTRAMUSCULAR; INTRAVENOUS; SUBCUTANEOUS at 11:51

## 2018-09-05 RX ADMIN — Medication 100 MILLIGRAM(S): at 16:55

## 2018-09-05 RX ADMIN — HEPARIN SODIUM 5000 UNIT(S): 5000 INJECTION INTRAVENOUS; SUBCUTANEOUS at 22:46

## 2018-09-05 RX ADMIN — Medication 500 MILLIGRAM(S): at 06:06

## 2018-09-05 RX ADMIN — HEPARIN SODIUM 5000 UNIT(S): 5000 INJECTION INTRAVENOUS; SUBCUTANEOUS at 05:35

## 2018-09-05 RX ADMIN — AMLODIPINE BESYLATE 5 MILLIGRAM(S): 2.5 TABLET ORAL at 05:35

## 2018-09-05 RX ADMIN — Medication 500 MILLIGRAM(S): at 23:10

## 2018-09-05 RX ADMIN — Medication 100 MILLIGRAM(S): at 23:04

## 2018-09-05 RX ADMIN — Medication 400 MILLIGRAM(S): at 00:40

## 2018-09-05 RX ADMIN — Medication 25 MILLIGRAM(S): at 05:35

## 2018-09-05 RX ADMIN — VALSARTAN 320 MILLIGRAM(S): 80 TABLET ORAL at 05:35

## 2018-09-05 RX ADMIN — Medication 50 MILLIGRAM(S): at 05:35

## 2018-09-05 RX ADMIN — Medication 500 MILLIGRAM(S): at 23:40

## 2018-09-05 RX ADMIN — ATORVASTATIN CALCIUM 20 MILLIGRAM(S): 80 TABLET, FILM COATED ORAL at 22:46

## 2018-09-05 RX ADMIN — HEPARIN SODIUM 5000 UNIT(S): 5000 INJECTION INTRAVENOUS; SUBCUTANEOUS at 16:56

## 2018-09-05 RX ADMIN — Medication 325 MILLIGRAM(S): at 11:37

## 2018-09-05 RX ADMIN — Medication 100 MILLIGRAM(S): at 08:32

## 2018-09-05 RX ADMIN — HYDROMORPHONE HYDROCHLORIDE 0.5 MILLIGRAM(S): 2 INJECTION INTRAMUSCULAR; INTRAVENOUS; SUBCUTANEOUS at 11:36

## 2018-09-05 RX ADMIN — Medication 500 MILLIGRAM(S): at 17:25

## 2018-09-05 RX ADMIN — SODIUM CHLORIDE 125 MILLILITER(S): 9 INJECTION, SOLUTION INTRAVENOUS at 11:37

## 2018-09-05 RX ADMIN — Medication 1000 MILLIGRAM(S): at 01:10

## 2018-09-05 RX ADMIN — Medication 500 MILLIGRAM(S): at 05:36

## 2018-09-05 NOTE — PROGRESS NOTE ADULT - ATTENDING COMMENTS
Rastafari. S/p PCNL.  Being monitored for bleeding, pain control.  Patient seen and examined.  Pain better controlled.  CT reviewed: no residual stones but clot in renal pelvis, small perirenal hematoma.  Remove nephrostomy tube and monitor drainage from nephrostomy site.

## 2018-09-05 NOTE — PROGRESS NOTE ADULT - SUBJECTIVE AND OBJECTIVE BOX
Urology PA Progress Note:    Subjective:  Patient seen and examined at bedside. No acute events overnight    Objective:  Vital signs  T(F): , Max: 98.5 (09-05-18 @ 01:28)  HR: 82 (09-05-18 @ 05:31)  BP: 131/70 (09-05-18 @ 05:31)  SpO2: 97% (09-05-18 @ 05:31)  Wt(kg): --    Output     09-04 @ 07:01  -  09-05 @ 07:00  --------------------------------------------------------  IN: 3065 mL / OUT: 2375 mL / NET: 690 mL    Beltran- 1700  NT- 25      Physical Exam:  Gen: NAD. AAOx3  Pulm: nonlabored  Abd: soft, NT/ND  : beltran in place with fruit punch output. L NT tube in place minimal with red output    Labs:  09-04  10.4  / 32.2  /1.31                           10.8   10.4  )-----------( 323      ( 04 Sep 2018 10:39 )             32.2     09-04    138  |  99  |  26<H>  ----------------------------<  159<H>  4.0   |  25  |  1.31<H>    Ca    9.4      04 Sep 2018 10:39

## 2018-09-06 ENCOUNTER — TRANSCRIPTION ENCOUNTER (OUTPATIENT)
Age: 68
End: 2018-09-06

## 2018-09-06 VITALS
TEMPERATURE: 98 F | SYSTOLIC BLOOD PRESSURE: 116 MMHG | RESPIRATION RATE: 16 BRPM | DIASTOLIC BLOOD PRESSURE: 69 MMHG | OXYGEN SATURATION: 97 % | HEART RATE: 63 BPM

## 2018-09-06 LAB
ANION GAP SERPL CALC-SCNC: 10 MMOL/L — SIGNIFICANT CHANGE UP (ref 5–17)
BUN SERPL-MCNC: 25 MG/DL — HIGH (ref 7–23)
CALCIUM SERPL-MCNC: 9.5 MG/DL — SIGNIFICANT CHANGE UP (ref 8.4–10.5)
CHLORIDE SERPL-SCNC: 99 MMOL/L — SIGNIFICANT CHANGE UP (ref 96–108)
CO2 SERPL-SCNC: 26 MMOL/L — SIGNIFICANT CHANGE UP (ref 22–31)
COMPN STONE: SIGNIFICANT CHANGE UP
CREAT SERPL-MCNC: 1.57 MG/DL — HIGH (ref 0.5–1.3)
GLUCOSE SERPL-MCNC: 181 MG/DL — HIGH (ref 70–99)
HCT VFR BLD CALC: 31.3 % — LOW (ref 34.5–45)
HGB BLD-MCNC: 10.6 G/DL — LOW (ref 11.5–15.5)
MCHC RBC-ENTMCNC: 31.2 PG — SIGNIFICANT CHANGE UP (ref 27–34)
MCHC RBC-ENTMCNC: 33.8 GM/DL — SIGNIFICANT CHANGE UP (ref 32–36)
MCV RBC AUTO: 92.5 FL — SIGNIFICANT CHANGE UP (ref 80–100)
PLATELET # BLD AUTO: 318 K/UL — SIGNIFICANT CHANGE UP (ref 150–400)
POTASSIUM SERPL-MCNC: 4.2 MMOL/L — SIGNIFICANT CHANGE UP (ref 3.5–5.3)
POTASSIUM SERPL-SCNC: 4.2 MMOL/L — SIGNIFICANT CHANGE UP (ref 3.5–5.3)
RBC # BLD: 3.38 M/UL — LOW (ref 3.8–5.2)
RBC # FLD: 12.3 % — SIGNIFICANT CHANGE UP (ref 10.3–14.5)
SODIUM SERPL-SCNC: 135 MMOL/L — SIGNIFICANT CHANGE UP (ref 135–145)
WBC # BLD: 12.9 K/UL — HIGH (ref 3.8–10.5)
WBC # FLD AUTO: 12.9 K/UL — HIGH (ref 3.8–10.5)

## 2018-09-06 PROCEDURE — 88300 SURGICAL PATH GROSS: CPT

## 2018-09-06 PROCEDURE — 80048 BASIC METABOLIC PNL TOTAL CA: CPT

## 2018-09-06 PROCEDURE — C1889: CPT

## 2018-09-06 PROCEDURE — C1758: CPT

## 2018-09-06 PROCEDURE — 74176 CT ABD & PELVIS W/O CONTRAST: CPT

## 2018-09-06 PROCEDURE — 76000 FLUOROSCOPY <1 HR PHYS/QHP: CPT

## 2018-09-06 PROCEDURE — C1726: CPT

## 2018-09-06 PROCEDURE — 87070 CULTURE OTHR SPECIMN AEROBIC: CPT

## 2018-09-06 PROCEDURE — 82962 GLUCOSE BLOOD TEST: CPT

## 2018-09-06 PROCEDURE — 85027 COMPLETE CBC AUTOMATED: CPT

## 2018-09-06 PROCEDURE — C1769: CPT

## 2018-09-06 PROCEDURE — 82365 CALCULUS SPECTROSCOPY: CPT

## 2018-09-06 RX ORDER — CEPHALEXIN 500 MG
1 CAPSULE ORAL
Qty: 6 | Refills: 0
Start: 2018-09-06 | End: 2018-09-08

## 2018-09-06 RX ORDER — SENNA PLUS 8.6 MG/1
2 TABLET ORAL
Qty: 0 | Refills: 0 | DISCHARGE
Start: 2018-09-06

## 2018-09-06 RX ADMIN — OXYCODONE AND ACETAMINOPHEN 1 TABLET(S): 5; 325 TABLET ORAL at 15:50

## 2018-09-06 RX ADMIN — Medication 50 MILLIGRAM(S): at 05:28

## 2018-09-06 RX ADMIN — HEPARIN SODIUM 5000 UNIT(S): 5000 INJECTION INTRAVENOUS; SUBCUTANEOUS at 13:03

## 2018-09-06 RX ADMIN — OXYCODONE AND ACETAMINOPHEN 1 TABLET(S): 5; 325 TABLET ORAL at 15:21

## 2018-09-06 RX ADMIN — OXYCODONE AND ACETAMINOPHEN 1 TABLET(S): 5; 325 TABLET ORAL at 11:33

## 2018-09-06 RX ADMIN — OXYCODONE AND ACETAMINOPHEN 1 TABLET(S): 5; 325 TABLET ORAL at 11:03

## 2018-09-06 RX ADMIN — OXYCODONE AND ACETAMINOPHEN 1 TABLET(S): 5; 325 TABLET ORAL at 05:28

## 2018-09-06 RX ADMIN — OXYCODONE AND ACETAMINOPHEN 1 TABLET(S): 5; 325 TABLET ORAL at 05:58

## 2018-09-06 RX ADMIN — Medication 25 MILLIGRAM(S): at 05:28

## 2018-09-06 RX ADMIN — Medication 325 MILLIGRAM(S): at 11:04

## 2018-09-06 RX ADMIN — VALSARTAN 320 MILLIGRAM(S): 80 TABLET ORAL at 06:28

## 2018-09-06 RX ADMIN — AMLODIPINE BESYLATE 5 MILLIGRAM(S): 2.5 TABLET ORAL at 05:28

## 2018-09-06 RX ADMIN — HEPARIN SODIUM 5000 UNIT(S): 5000 INJECTION INTRAVENOUS; SUBCUTANEOUS at 05:28

## 2018-09-06 RX ADMIN — Medication 100 MILLIGRAM(S): at 10:42

## 2018-09-06 NOTE — DISCHARGE NOTE ADULT - CARE PROVIDER_API CALL
Maricruz Morrissey), Urology  [address] [place] [address]  Phone: [phone]  Fax: [phone]

## 2018-09-06 NOTE — PROVIDER CONTACT NOTE (OTHER) - ACTION/TREATMENT ORDERED:
Urology team notified, Baylee SOTELO made aware, give pt some water, recheck vitals, continue to monitor until pt transport arrives

## 2018-09-06 NOTE — DISCHARGE NOTE ADULT - HOSPITAL COURSE
pt is a 67 yo f with a pmh of cad. ht.n hld. diet controlled dm2, nephrolithiasis who arrived at [facility] on 9/4 for a left pcnl. Please see operative report  for further details.  post op she did well. her vitals were stable, she tolerated diet, her pain was controlled, she ambulated  and her labs were stable.  on pod 1 her ct showed no residual stone burden and she underwent a successful tov. on pod 2 her nt was successfully removed and she was dcd home in stable condition

## 2018-09-06 NOTE — DISCHARGE NOTE ADULT - PATIENT PORTAL LINK FT
You can access the [facility] Portal, offered by [facility], by registering with the following website: [email or web address]

## 2018-09-06 NOTE — DISCHARGE NOTE ADULT - SECONDARY DIAGNOSIS.
Coronary artery disease involving native coronary artery of native heart without angina pectoris Essential hypertension Pure hypercholesterolemia Type 2 diabetes mellitus without complication, without long-term current use of insulin

## 2018-09-06 NOTE — PROVIDER CONTACT NOTE (OTHER) - ASSESSMENT
Pt is urinating red color urine frequently. Pt A&Ox4. Pt has no output through neph tube. PT expected to go from procedure 9/6.

## 2018-09-06 NOTE — DISCHARGE NOTE ADULT - CARE PLAN
Principal Discharge DX:	Calculus of kidney  Goal:	you had a left percutaneous nephrolithotomy  Assessment and plan of treatment:	Call the office if you experience fever, chills, uncontrolled pain, the inability to tolerate liquids, or the urine does not flow   to promote wound healing do not take a bath, continue to walk frequently, return to daily living activities slowly, no heavy lifting greater than 10lbs for 4-6 weeks, follow up Dr Morrissey in two weeks  Secondary Diagnosis:	Coronary artery disease involving native coronary artery of native heart without angina pectoris  Goal:	maintain a healthy heart  Assessment and plan of treatment:	continue aspirin and home medications  Secondary Diagnosis:	Essential hypertension  Goal:	maintain adequate blood pressure  Assessment and plan of treatment:	continue home medication  Secondary Diagnosis:	Pure hypercholesterolemia  Goal:	maintain adequate cholesterol  Assessment and plan of treatment:	continue home medication  Secondary Diagnosis:	Type 2 diabetes mellitus without complication, without long-term current use of insulin  Goal:	maintain adequate blood sugar  Assessment and plan of treatment:	continue eating a low carbohydrate diet

## 2018-09-06 NOTE — DISCHARGE NOTE ADULT - MEDICATION SUMMARY - MEDICATIONS TO TAKE
I will START or STAY ON the medications listed below when I get home from the hospital:    aspirin 325 mg oral tablet  -- 1 tab(s) by mouth once a day  -- Indication: For Home medication     oxyCODONE-acetaminophen 5 mg-325 mg oral tablet  -- 1 tab(s) by mouth every 4 hours, As needed, Mild Pain (1 - 3) MDD:6  -- Indication: For pain medication     Tylenol 500 mg oral tablet  -- 2 tab(s) by mouth every 6 hours, As Needed  -- Indication: For pain medication     Lipitor 20 mg oral tablet  -- orally once a day (at bedtime)  -- Indication: For Home medication     Diovan  mg-25 mg oral tablet  -- 1 tab(s) by mouth once a day  -- Indication: For Home medication     Metoprolol Succinate ER 50 mg oral tablet, extended release  -- 1 tab(s) by mouth once a day  -- Indication: For Home medication     amLODIPine 5 mg oral tablet  -- 1 tab(s) by mouth once a day  -- Indication: For Home medication     Keflex 500 mg oral capsule  -- 1 cap(s) by mouth every 12 hours   -- Finish all this medication unless otherwise directed by prescriber.    -- Indication: For antibiotic prophylaxis     senna oral tablet  -- 2 tab(s) by mouth once a day (at bedtime), As needed, Constipation  -- Indication: For stool softener    Colace 100 mg oral capsule  -- 1 cap(s) by mouth 2 times a day  -- Indication: For stool softener    Centrum oral tablet  -- 1 tab(s) by mouth once a day  -- Indication: For Home medication     B Complex 50 oral tablet, extended release  -- 1 tab(s) by mouth once a day, As Needed  -- Indication: For Home medication

## 2018-09-06 NOTE — DISCHARGE NOTE ADULT - CARE PROVIDERS DIRECT ADDRESSES
[email or web address]

## 2018-09-06 NOTE — PROVIDER CONTACT NOTE (OTHER) - ASSESSMENT
A&Ox4, vss, orthostatic completed wnl, pt is stable, complaining of dizziness, pt denies SOB, chest pain, or pain in general

## 2018-09-06 NOTE — DISCHARGE NOTE ADULT - PLAN OF CARE
you had a left percutaneous nephrolithotomy Call the office if you experience fever, chills, uncontrolled pain, the inability to tolerate liquids, or the urine does not flow   to promote wound healing do not take a bath, continue to walk frequently, return to daily living activities slowly, no heavy lifting greater than 10lbs for 4-6 weeks, follow up Dr Morrissey in two weeks maintain a healthy heart continue aspirin and home medications maintain adequate blood pressure continue home medication maintain adequate cholesterol maintain adequate blood sugar continue eating a low carbohydrate diet

## 2018-09-06 NOTE — PROGRESS NOTE ADULT - SUBJECTIVE AND OBJECTIVE BOX
Subjective  feeling well without complaints   no stone residual on ct   Objective    Vital signs  T(F): , Max: 98.7 (09-05-18 @ 21:46)  HR: 65 (09-06-18 @ 05:25)  BP: 145/65 (09-06-18 @ 05:25)  SpO2: 100% (09-06-18 @ 05:25)  Wt(kg): --    Output     09-05 @ 07:01  -  09-06 @ 07:00  --------------------------------------------------------  IN: 3450 mL / OUT: 2500 mL / NET: 950 mL        Gen awake alert nad axox3  Abd obese soft ntnd   Back tube in place no hematoma/ ecchymosis  urine scant tea    voiding bladder nonpalp     Labs      09-05 @ 07:12    WBC 13.4  / Hct 32.1  / SCr 1.55     09-04 @ 10:39    WBC 10.4  / Hct 32.2  / SCr 1.31           Imaging  < from: CT Abdomen and Pelvis No Cont (09.05.18 @ 14:43) >  FINDINGS:    LOWER CHEST: Trace bibasilar atelectasis. Coronary arterial   atherosclerosis.    LIVER: Within normal limits.  BILE DUCTS: Normal caliber.  GALLBLADDER: Status post cholecystectomy.  SPLEEN: Within normal limits.  PANCREAS: Within normal limits.  ADRENALS: Within normal limits.  KIDNEYS/URETERS: Status post left PCNL with a nephrostomy catheter in   place. No residual left-sided urinary tract calculi. A left extrarenal   pelvis is distended with hemorrhage.     BLADDER: Within normal limits.  REPRODUCTIVE ORGANS: Hysterectomy.    BOWEL: No bowel obstruction. The appendix is normal.  PERITONEUM: No ascites.  VESSELS:  Atherosclerosis.  RETROPERITONEUM: Posterior left pararenal space postsurgical change.    ABDOMINAL WALL: Small fat-containing umbilical hernia.  BONES: Degenerative changes.    IMPRESSION:     Status post left antegrade PCNL. No residual urinary tract calculi.    Distended left extrarenal pelvis containing hemorrhage.    < end of copied text >

## 2018-09-07 LAB
CULTURE RESULTS: SIGNIFICANT CHANGE UP
SPECIMEN SOURCE: SIGNIFICANT CHANGE UP

## 2018-09-10 LAB
SURGICAL PATHOLOGY STUDY: SIGNIFICANT CHANGE UP
SURGICAL PATHOLOGY STUDY: SIGNIFICANT CHANGE UP

## 2018-09-14 PROBLEM — E66.01 MORBID (SEVERE) OBESITY DUE TO EXCESS CALORIES: Chronic | Status: ACTIVE | Noted: 2018-08-28

## 2018-09-14 PROBLEM — R01.1 CARDIAC MURMUR, UNSPECIFIED: Chronic | Status: ACTIVE | Noted: 2018-08-28

## 2018-09-14 PROBLEM — I65.23 OCCLUSION AND STENOSIS OF BILATERAL CAROTID ARTERIES: Chronic | Status: ACTIVE | Noted: 2018-08-28

## 2018-09-20 ENCOUNTER — APPOINTMENT (OUTPATIENT)
Dept: UROLOGY | Facility: CLINIC | Age: 68
End: 2018-09-20
Payer: MEDICARE

## 2018-09-20 ENCOUNTER — APPOINTMENT (OUTPATIENT)
Dept: UROLOGY | Facility: CLINIC | Age: 68
End: 2018-09-20

## 2018-09-20 VITALS
HEIGHT: 62 IN | OXYGEN SATURATION: 96 % | HEART RATE: 60 BPM | TEMPERATURE: 97.9 F | WEIGHT: 220 LBS | DIASTOLIC BLOOD PRESSURE: 72 MMHG | RESPIRATION RATE: 16 BRPM | BODY MASS INDEX: 40.48 KG/M2 | SYSTOLIC BLOOD PRESSURE: 124 MMHG

## 2018-09-20 DIAGNOSIS — N20.1 CALCULUS OF URETER: ICD-10-CM

## 2018-09-20 PROCEDURE — 99024 POSTOP FOLLOW-UP VISIT: CPT

## 2018-10-26 ENCOUNTER — APPOINTMENT (OUTPATIENT)
Dept: UROLOGY | Facility: CLINIC | Age: 68
End: 2018-10-26

## 2019-01-11 ENCOUNTER — APPOINTMENT (OUTPATIENT)
Dept: UROLOGY | Facility: CLINIC | Age: 69
End: 2019-01-11
Payer: MEDICARE

## 2019-01-11 VITALS — SYSTOLIC BLOOD PRESSURE: 144 MMHG | DIASTOLIC BLOOD PRESSURE: 80 MMHG

## 2019-01-11 PROCEDURE — 99212 OFFICE O/P EST SF 10 MIN: CPT

## 2019-01-11 RX ORDER — VALSARTAN AND HYDROCHLOROTHIAZIDE 320; 25 MG/1; MG/1
320-25 TABLET, FILM COATED ORAL
Refills: 0 | Status: DISCONTINUED | COMMUNITY
End: 2019-01-11

## 2019-01-11 RX ORDER — VALSARTAN AND HYDROCHLOROTHIAZIDE 320; 25 MG/1; MG/1
320-25 TABLET, FILM COATED ORAL
Qty: 30 | Refills: 0 | Status: DISCONTINUED | COMMUNITY
Start: 2018-01-13 | End: 2019-01-11

## 2019-01-11 RX ORDER — LOSARTAN POTASSIUM AND HYDROCHLOROTHIAZIDE 25; 100 MG/1; MG/1
100-25 TABLET ORAL
Refills: 0 | Status: ACTIVE | COMMUNITY

## 2019-01-11 NOTE — ASSESSMENT
[FreeTextEntry1] : she will complete 24 hr urine and return in 2 months to review results\par renal sono also ordered to be done before next visit

## 2019-01-11 NOTE — HISTORY OF PRESENT ILLNESS
[FreeTextEntry1] : see notes from last visit\par Pt here for f/u\par she forgot to do 24 hr urine. she was preoccupied with her taking care of her mother in [place] since last visit.\par she has no pain\par urine is clear\par she feels well.

## 2019-01-11 NOTE — PHYSICAL EXAM
[General Appearance - Well Developed] : well developed [General Appearance - Well Nourished] : well nourished [Normal Appearance] : normal appearance [Well Groomed] : well groomed [General Appearance - In No Acute Distress] : no acute distress [Abdomen Soft] : soft [Abdomen Tenderness] : non-tender [Costovertebral Angle Tenderness] : no ~M costovertebral angle tenderness [Skin Color & Pigmentation] : normal skin color and pigmentation [] : no respiratory distress [Respiration, Rhythm And Depth] : normal respiratory rhythm and effort [Exaggerated Use Of Accessory Muscles For Inspiration] : no accessory muscle use [Normal Station and Gait] : the gait and station were normal for the patient's age

## 2019-04-30 ENCOUNTER — FORM ENCOUNTER (OUTPATIENT)
Age: 69
End: 2019-04-30

## 2019-05-01 ENCOUNTER — APPOINTMENT (OUTPATIENT)
Dept: ULTRASOUND IMAGING | Facility: CLINIC | Age: 69
End: 2019-05-01
Payer: MEDICARE

## 2019-05-01 ENCOUNTER — OUTPATIENT (OUTPATIENT)
Dept: OUTPATIENT SERVICES | Facility: HOSPITAL | Age: 69
LOS: 1 days | End: 2019-05-01
Payer: MEDICARE

## 2019-05-01 DIAGNOSIS — Z98.891 HISTORY OF UTERINE SCAR FROM PREVIOUS SURGERY: Chronic | ICD-10-CM

## 2019-05-01 DIAGNOSIS — Z00.8 ENCOUNTER FOR OTHER GENERAL EXAMINATION: ICD-10-CM

## 2019-05-01 DIAGNOSIS — Z98.890 OTHER SPECIFIED POSTPROCEDURAL STATES: Chronic | ICD-10-CM

## 2019-05-01 DIAGNOSIS — Z90.710 ACQUIRED ABSENCE OF BOTH CERVIX AND UTERUS: Chronic | ICD-10-CM

## 2019-05-01 DIAGNOSIS — Z90.49 ACQUIRED ABSENCE OF OTHER SPECIFIED PARTS OF DIGESTIVE TRACT: Chronic | ICD-10-CM

## 2019-05-01 DIAGNOSIS — Z95.5 PRESENCE OF CORONARY ANGIOPLASTY IMPLANT AND GRAFT: Chronic | ICD-10-CM

## 2019-05-01 DIAGNOSIS — N20.0 CALCULUS OF KIDNEY: ICD-10-CM

## 2019-05-01 PROCEDURE — 76770 US EXAM ABDO BACK WALL COMP: CPT

## 2019-05-01 PROCEDURE — 76770 US EXAM ABDO BACK WALL COMP: CPT | Mod: 26

## 2019-05-07 NOTE — PROGRESS NOTE ADULT - ASSESSMENT
Surgical vs nonsurgical treatment options were discussed. A/P: 68F s/p L PCNL    - Diet  - CBC/BMP  - Ancef  - f/u stone cx  - ASA 325mg  - check color  - Keep beltran for now  - CT scan to evaluate residual stone. Pending results if 2nd stage procedure required  - DVT ppx

## 2019-05-10 ENCOUNTER — APPOINTMENT (OUTPATIENT)
Dept: UROLOGY | Facility: CLINIC | Age: 69
End: 2019-05-10
Payer: MEDICARE

## 2019-05-10 VITALS
WEIGHT: 220 LBS | HEIGHT: 62 IN | BODY MASS INDEX: 40.48 KG/M2 | TEMPERATURE: 97.7 F | HEART RATE: 70 BPM | SYSTOLIC BLOOD PRESSURE: 168 MMHG | DIASTOLIC BLOOD PRESSURE: 84 MMHG | OXYGEN SATURATION: 91 %

## 2019-05-10 PROCEDURE — 99214 OFFICE O/P EST MOD 30 MIN: CPT

## 2019-05-10 NOTE — HISTORY OF PRESENT ILLNESS
[FreeTextEntry1] : pt here for f/u\par hx of LEFT PCNL 9/4/2018\par hx of RIGHT URS 8/2/2018\par \par stones\par RIGHT: 100% Calcium oxalate monohydrate  \par LEFT : 80% Uric acid, 20% Calcium oxalate monohydrate\par \par 2x24 hr urine: low vol, high sodium and calcium, low citrate, high PCR and UUN\par Renal sono: no stones bilaterally\par \par Plan:\par Increase fluids intake\par low salt diet\par reduce animal protein intake\par increase citrus fruits and juices\par printed guides provided\par add k-citrate if citrate still low on follow up 24 hr urine.\par \par 24 hr urine and renal sono before next visit\par f/u 4 months\par

## 2019-10-08 ENCOUNTER — FORM ENCOUNTER (OUTPATIENT)
Age: 69
End: 2019-10-08

## 2019-10-09 ENCOUNTER — OUTPATIENT (OUTPATIENT)
Dept: OUTPATIENT SERVICES | Facility: HOSPITAL | Age: 69
LOS: 1 days | End: 2019-10-09

## 2019-10-09 ENCOUNTER — APPOINTMENT (OUTPATIENT)
Dept: ULTRASOUND IMAGING | Facility: CLINIC | Age: 69
End: 2019-10-09
Payer: MEDICARE

## 2019-10-09 DIAGNOSIS — Z00.8 ENCOUNTER FOR OTHER GENERAL EXAMINATION: ICD-10-CM

## 2019-10-09 DIAGNOSIS — Z90.49 ACQUIRED ABSENCE OF OTHER SPECIFIED PARTS OF DIGESTIVE TRACT: Chronic | ICD-10-CM

## 2019-10-09 DIAGNOSIS — Z98.890 OTHER SPECIFIED POSTPROCEDURAL STATES: Chronic | ICD-10-CM

## 2019-10-09 DIAGNOSIS — Z98.891 HISTORY OF UTERINE SCAR FROM PREVIOUS SURGERY: Chronic | ICD-10-CM

## 2019-10-09 DIAGNOSIS — Z90.710 ACQUIRED ABSENCE OF BOTH CERVIX AND UTERUS: Chronic | ICD-10-CM

## 2019-10-09 DIAGNOSIS — Z95.5 PRESENCE OF CORONARY ANGIOPLASTY IMPLANT AND GRAFT: Chronic | ICD-10-CM

## 2019-10-09 PROCEDURE — 76770 US EXAM ABDO BACK WALL COMP: CPT | Mod: 26

## 2019-10-21 ENCOUNTER — APPOINTMENT (OUTPATIENT)
Dept: UROLOGY | Facility: CLINIC | Age: 69
End: 2019-10-21
Payer: MEDICARE

## 2019-10-21 VITALS
DIASTOLIC BLOOD PRESSURE: 75 MMHG | WEIGHT: 220 LBS | BODY MASS INDEX: 40.48 KG/M2 | HEART RATE: 69 BPM | TEMPERATURE: 98.1 F | SYSTOLIC BLOOD PRESSURE: 144 MMHG | HEIGHT: 62 IN | RESPIRATION RATE: 17 BRPM

## 2019-10-21 DIAGNOSIS — R82.6 ABNORMAL URINE LEVELS OF SUBSTANCES CHIEFLY NONMEDICINAL AS TO SOURCE: ICD-10-CM

## 2019-10-21 DIAGNOSIS — R82.994 HYPERCALCIURIA: ICD-10-CM

## 2019-10-21 DIAGNOSIS — R82.991 HYPOCITRATURIA: ICD-10-CM

## 2019-10-21 DIAGNOSIS — E87.0 HYPEROSMOLALITY AND HYPERNATREMIA: ICD-10-CM

## 2019-10-21 DIAGNOSIS — N20.0 CALCULUS OF KIDNEY: ICD-10-CM

## 2019-10-21 PROCEDURE — 99214 OFFICE O/P EST MOD 30 MIN: CPT

## 2019-10-27 PROBLEM — R82.994 HYPERCALCIURIA: Status: ACTIVE | Noted: 2019-05-10

## 2019-10-27 PROBLEM — R82.991 HYPOCITRATURIA: Status: ACTIVE | Noted: 2019-05-10

## 2019-10-27 PROBLEM — E87.0 HYPERNATRIURIA: Status: ACTIVE | Noted: 2019-05-10

## 2019-10-27 PROBLEM — N20.0 NEPHROLITHIASIS: Status: ACTIVE | Noted: 2018-07-05

## 2019-10-27 PROBLEM — R82.6 ABNORMAL URINE LEVELS OF SUBSTANCES CHIEFLY NONMEDICINAL AS TO SOURCE: Status: ACTIVE | Noted: 2019-05-10

## 2019-10-27 NOTE — PHYSICAL EXAM
[General Appearance - Well Developed] : well developed [General Appearance - Well Nourished] : well nourished [Normal Appearance] : normal appearance [Well Groomed] : well groomed [General Appearance - In No Acute Distress] : no acute distress [Abdomen Soft] : soft [Abdomen Tenderness] : non-tender [Costovertebral Angle Tenderness] : no ~M costovertebral angle tenderness [Skin Color & Pigmentation] : normal skin color and pigmentation [] : no respiratory distress [Respiration, Rhythm And Depth] : normal respiratory rhythm and effort [Exaggerated Use Of Accessory Muscles For Inspiration] : no accessory muscle use [Oriented To Time, Place, And Person] : oriented to person, place, and time [Not Anxious] : not anxious [Normal Station and Gait] : the gait and station were normal for the patient's age

## 2019-10-27 NOTE — HISTORY OF PRESENT ILLNESS
[None] : None [FreeTextEntry1] : 70 y/o woman\par hx of LEFT PCNL 9/4/2018\par hx of RIGHT URS 8/2/2018\par \par stones\par RIGHT: 100% Calcium oxalate monohydrate  \par LEFT : 80% Uric acid, 20% Calcium oxalate monohydrate\par \par 24 hr urine: all parameters improved, but sodium still slightly high.\par Renal sono: no stones bilaterally\par \par

## 2019-10-27 NOTE — ASSESSMENT
[FreeTextEntry1] : Plan:\par Continue increased fluids intake\par continue low salt diet and reduce animal protein intake\par continue increase citrus fruits and juices\par \par 24 hr urine and renal sono before next visit\par f/u 5 months\par

## 2019-12-27 NOTE — END OF VISIT
[>50% of Time Spent on Counseling and Coordination of Care for  ___] : Greater than 50% of the encounter time was spent on counseling and coordination of care for [unfilled] [Time Spent: ___ minutes] : I have spent [unfilled] minutes of face to face time with the patient  used

## 2020-04-03 ENCOUNTER — APPOINTMENT (OUTPATIENT)
Dept: DISASTER EMERGENCY | Facility: CLINIC | Age: 70
End: 2020-04-03
Payer: MEDICARE

## 2020-04-03 VITALS
HEART RATE: 79 BPM | SYSTOLIC BLOOD PRESSURE: 134 MMHG | RESPIRATION RATE: 22 BRPM | TEMPERATURE: 100.9 F | OXYGEN SATURATION: 94 % | DIASTOLIC BLOOD PRESSURE: 68 MMHG

## 2020-04-03 DIAGNOSIS — Z20.828 CONTACT WITH AND (SUSPECTED) EXPOSURE TO OTHER VIRAL COMMUNICABLE DISEASES: ICD-10-CM

## 2020-04-03 DIAGNOSIS — R68.89 OTHER GENERAL SYMPTOMS AND SIGNS: ICD-10-CM

## 2020-04-03 PROCEDURE — 99213 OFFICE O/P EST LOW 20 MIN: CPT | Mod: CS

## 2020-04-03 NOTE — HISTORY OF PRESENT ILLNESS
[Patient presents to the office today for COVID-19 evaluation and testing.] : Patient presents to the office today for COVID-19 evaluation and testing. [] : no dizziness on standing [None Known] : none known [Heart Disease] : heart disease [None] : none [Clear] : clear [Good Air Entry] : good air entry [Speaks in full sentences] : speaks in full sentences [Hypoxic with minimal exertion (walking)] : hypoxic with minimal exertion (walking) [COVID-19 testing ordered and specimen obtained] : COVID-19 testing ordered and specimen obtained [Discharged with current Quarantine instructions and advised of signs of worsening illness.] : Patient discharged with current quarantine instructions and advised of signs of worsening illness. Patient told to seek emergent care if symptoms occur. [FreeTextEntry1] : Marked fatigue and SOB, fevers (to 102.2) for the past 4 days.  Symptoms improve with tylenol.  Loss of apetite.  Diarrhea for days, with marked urgency.  Dry cough.\par  also very sick.   had contact with covid positive friend. [Grossly normal, interacts, not tired or weak] : grossly normal, interacts, not tired or weak [TextBox_97] : Desaturation from baseline 94% to 89% with ambulation. [TextBox_107] : Patient declines going to hospital at this time.  Discussed at length, and she is to go to hospital by 911 if SOB gets worse.  She prefers [facility].

## 2020-04-05 LAB — SARS-COV-2 N GENE NPH QL NAA+PROBE: DETECTED

## 2020-04-13 ENCOUNTER — APPOINTMENT (OUTPATIENT)
Dept: UROLOGY | Facility: CLINIC | Age: 70
End: 2020-04-13

## 2020-05-08 PROBLEM — Z20.828 EXPOSURE TO COVID-19 VIRUS: Status: ACTIVE | Noted: 2020-05-08

## 2021-03-22 ENCOUNTER — APPOINTMENT (OUTPATIENT)
Dept: DISASTER EMERGENCY | Facility: CLINIC | Age: 71
End: 2021-03-22

## 2021-03-22 DIAGNOSIS — Z01.818 ENCOUNTER FOR OTHER PREPROCEDURAL EXAMINATION: ICD-10-CM

## 2021-03-23 LAB — SARS-COV-2 N GENE NPH QL NAA+PROBE: NOT DETECTED

## 2021-03-29 ENCOUNTER — APPOINTMENT (OUTPATIENT)
Dept: CT IMAGING | Facility: CLINIC | Age: 71
End: 2021-03-29
Payer: MEDICARE

## 2021-03-29 ENCOUNTER — OUTPATIENT (OUTPATIENT)
Dept: OUTPATIENT SERVICES | Facility: HOSPITAL | Age: 71
LOS: 1 days | End: 2021-03-29
Payer: MEDICARE

## 2021-03-29 DIAGNOSIS — Z98.891 HISTORY OF UTERINE SCAR FROM PREVIOUS SURGERY: Chronic | ICD-10-CM

## 2021-03-29 DIAGNOSIS — Z98.890 OTHER SPECIFIED POSTPROCEDURAL STATES: Chronic | ICD-10-CM

## 2021-03-29 DIAGNOSIS — Z90.710 ACQUIRED ABSENCE OF BOTH CERVIX AND UTERUS: Chronic | ICD-10-CM

## 2021-03-29 DIAGNOSIS — Z95.5 PRESENCE OF CORONARY ANGIOPLASTY IMPLANT AND GRAFT: Chronic | ICD-10-CM

## 2021-03-29 DIAGNOSIS — Z90.49 ACQUIRED ABSENCE OF OTHER SPECIFIED PARTS OF DIGESTIVE TRACT: Chronic | ICD-10-CM

## 2021-03-29 DIAGNOSIS — Z00.8 ENCOUNTER FOR OTHER GENERAL EXAMINATION: ICD-10-CM

## 2021-03-29 PROCEDURE — 74177 CT ABD & PELVIS W/CONTRAST: CPT

## 2021-03-29 PROCEDURE — 74177 CT ABD & PELVIS W/CONTRAST: CPT | Mod: 26,MH

## 2021-03-30 ENCOUNTER — OUTPATIENT (OUTPATIENT)
Dept: OUTPATIENT SERVICES | Facility: HOSPITAL | Age: 71
LOS: 1 days | End: 2021-03-30
Payer: MEDICARE

## 2021-03-30 VITALS
HEART RATE: 70 BPM | DIASTOLIC BLOOD PRESSURE: 88 MMHG | SYSTOLIC BLOOD PRESSURE: 140 MMHG | TEMPERATURE: 97 F | OXYGEN SATURATION: 98 % | HEIGHT: 62 IN | WEIGHT: 229.94 LBS | RESPIRATION RATE: 16 BRPM

## 2021-03-30 DIAGNOSIS — E11.9 TYPE 2 DIABETES MELLITUS WITHOUT COMPLICATIONS: ICD-10-CM

## 2021-03-30 DIAGNOSIS — C19 MALIGNANT NEOPLASM OF RECTOSIGMOID JUNCTION: ICD-10-CM

## 2021-03-30 DIAGNOSIS — Z90.49 ACQUIRED ABSENCE OF OTHER SPECIFIED PARTS OF DIGESTIVE TRACT: Chronic | ICD-10-CM

## 2021-03-30 DIAGNOSIS — Z90.710 ACQUIRED ABSENCE OF BOTH CERVIX AND UTERUS: Chronic | ICD-10-CM

## 2021-03-30 DIAGNOSIS — Z95.5 PRESENCE OF CORONARY ANGIOPLASTY IMPLANT AND GRAFT: Chronic | ICD-10-CM

## 2021-03-30 DIAGNOSIS — Z91.040 LATEX ALLERGY STATUS: ICD-10-CM

## 2021-03-30 DIAGNOSIS — Z95.5 PRESENCE OF CORONARY ANGIOPLASTY IMPLANT AND GRAFT: ICD-10-CM

## 2021-03-30 DIAGNOSIS — I10 ESSENTIAL (PRIMARY) HYPERTENSION: ICD-10-CM

## 2021-03-30 DIAGNOSIS — Z98.891 HISTORY OF UTERINE SCAR FROM PREVIOUS SURGERY: Chronic | ICD-10-CM

## 2021-03-30 DIAGNOSIS — Z98.890 OTHER SPECIFIED POSTPROCEDURAL STATES: Chronic | ICD-10-CM

## 2021-03-30 LAB
A1C WITH ESTIMATED AVERAGE GLUCOSE RESULT: 6 % — HIGH (ref 4–5.6)
ANION GAP SERPL CALC-SCNC: 14 MMOL/L — SIGNIFICANT CHANGE UP (ref 7–14)
BLD GP AB SCN SERPL QL: NEGATIVE — SIGNIFICANT CHANGE UP
BUN SERPL-MCNC: 27 MG/DL — HIGH (ref 7–23)
CALCIUM SERPL-MCNC: 10 MG/DL — SIGNIFICANT CHANGE UP (ref 8.4–10.5)
CHLORIDE SERPL-SCNC: 101 MMOL/L — SIGNIFICANT CHANGE UP (ref 98–107)
CO2 SERPL-SCNC: 25 MMOL/L — SIGNIFICANT CHANGE UP (ref 22–31)
CREAT SERPL-MCNC: 1.24 MG/DL — SIGNIFICANT CHANGE UP (ref 0.5–1.3)
ESTIMATED AVERAGE GLUCOSE: 126 MG/DL — HIGH (ref 68–114)
GLUCOSE SERPL-MCNC: 57 MG/DL — LOW (ref 70–99)
HCT VFR BLD CALC: 34.6 % — SIGNIFICANT CHANGE UP (ref 34.5–45)
HGB BLD-MCNC: 11.5 G/DL — SIGNIFICANT CHANGE UP (ref 11.5–15.5)
MCHC RBC-ENTMCNC: 30.3 PG — SIGNIFICANT CHANGE UP (ref 27–34)
MCHC RBC-ENTMCNC: 33.2 GM/DL — SIGNIFICANT CHANGE UP (ref 32–36)
MCV RBC AUTO: 91.3 FL — SIGNIFICANT CHANGE UP (ref 80–100)
NRBC # BLD: 0 /100 WBCS — SIGNIFICANT CHANGE UP
NRBC # FLD: 0 K/UL — SIGNIFICANT CHANGE UP
PLATELET # BLD AUTO: 503 K/UL — HIGH (ref 150–400)
POTASSIUM SERPL-MCNC: 3.4 MMOL/L — LOW (ref 3.5–5.3)
POTASSIUM SERPL-SCNC: 3.4 MMOL/L — LOW (ref 3.5–5.3)
RBC # BLD: 3.79 M/UL — LOW (ref 3.8–5.2)
RBC # FLD: 12.6 % — SIGNIFICANT CHANGE UP (ref 10.3–14.5)
RH IG SCN BLD-IMP: POSITIVE — SIGNIFICANT CHANGE UP
SODIUM SERPL-SCNC: 140 MMOL/L — SIGNIFICANT CHANGE UP (ref 135–145)
WBC # BLD: 12.28 K/UL — HIGH (ref 3.8–10.5)
WBC # FLD AUTO: 12.28 K/UL — HIGH (ref 3.8–10.5)

## 2021-03-30 PROCEDURE — 93010 ELECTROCARDIOGRAM REPORT: CPT

## 2021-03-30 RX ORDER — METOPROLOL TARTRATE 50 MG
1 TABLET ORAL
Qty: 0 | Refills: 0 | DISCHARGE

## 2021-03-30 RX ORDER — ASPIRIN/CALCIUM CARB/MAGNESIUM 324 MG
1 TABLET ORAL
Qty: 0 | Refills: 0 | DISCHARGE

## 2021-03-30 RX ORDER — AMLODIPINE BESYLATE 2.5 MG/1
1 TABLET ORAL
Qty: 0 | Refills: 0 | DISCHARGE

## 2021-03-30 RX ORDER — MULTIVIT-MIN/FERROUS GLUCONATE 9 MG/15 ML
1 LIQUID (ML) ORAL
Qty: 0 | Refills: 0 | DISCHARGE

## 2021-03-30 RX ORDER — ACETAMINOPHEN 500 MG
2 TABLET ORAL
Qty: 0 | Refills: 0 | DISCHARGE

## 2021-03-30 RX ORDER — SODIUM CHLORIDE 9 MG/ML
3 INJECTION INTRAMUSCULAR; INTRAVENOUS; SUBCUTANEOUS EVERY 8 HOURS
Refills: 0 | Status: DISCONTINUED | OUTPATIENT
Start: 2021-04-05 | End: 2021-04-13

## 2021-03-30 RX ORDER — ATORVASTATIN CALCIUM 80 MG/1
0 TABLET, FILM COATED ORAL
Qty: 0 | Refills: 0 | DISCHARGE

## 2021-03-30 RX ORDER — DOCUSATE SODIUM 100 MG
1 CAPSULE ORAL
Qty: 0 | Refills: 0 | DISCHARGE

## 2021-03-30 NOTE — H&P PST ADULT - PRIMARY CARE PROVIDER
Dilip Combs  OTOLARYNGOLOGY  [address] [address]  Phone: [phone]  Fax: [phone]  Follow Up Time: 2 weeks    Primary Doctor,   Follow up with your [facility] PCP in the next two weeks for a recheck.  Phone: (   )    -  Fax: (   )    -  Follow Up Time: 2 weeks Dr. Keith Abdi [phone]

## 2021-03-30 NOTE — H&P PST ADULT - NEGATIVE GASTROINTESTINAL SYMPTOMS
no nausea/no vomiting/no diarrhea/no constipation/no change in bowel habits/no abdominal pain/no melena/no jaundice

## 2021-03-30 NOTE — H&P PST ADULT - NSICDXPROBLEM_GEN_ALL_CORE_FT
PROBLEM DIAGNOSES  Problem: Malignant neoplasm of rectosigmoid (colon)  Assessment and Plan: preop for laparoscopic sigmoid resection on 4/5/21  preop instructions given, pt verbalized understanding  GI prophylaxis and chlorhexidine wash provided  pt is scheduled for COVID testing preop      Problem: HTN (hypertension)  Assessment and Plan: pt will take blood pressure meds Norvasc, Losartan/HCTZ, carvedilol and hydralazine AM of surgery as prescribed      Problem: Type 2 diabetes mellitus  Assessment and Plan: pt will hold glipizide AM of surgery as prescribed   accu check to be assessed on admission      Problem: Latex allergy  Assessment and Plan: OR booking notified of latex allergy status    Problem: Stented coronary artery  Assessment and Plan: pt will remain on low dose ASA due to presence of coronary stent   cardiac cleararance, ECHO and comparison EKG requested

## 2021-03-30 NOTE — H&P PST ADULT - HISTORY OF PRESENT ILLNESS
69 y/o female with HTN, Type 2 DM and CAD s/p PCI x1 presents to PST preop for laparoscopic sigmoid resection. preop dx of malignant neoplasm of rectosigmoid colon.  pt reports a routine colonoscopy in February revealed a cancerous polyp in the sigmoid colon.  71 y/o female with HTN, HLD, Type 2 DM and CAD s/p PCI x1 presents to PST preop for laparoscopic sigmoid resection. preop dx of malignant neoplasm of rectosigmoid colon.  pt reports a routine colonoscopy in February revealed a cancerous polyp in the sigmoid colon.

## 2021-03-30 NOTE — H&P PST ADULT - NSANTHOSAYNRD_GEN_A_CORE
No. TIBURCOI screening performed.  STOP BANG Legend: 0-2 = LOW Risk; 3-4 = INTERMEDIATE Risk; 5-8 = HIGH Risk

## 2021-03-30 NOTE — H&P PST ADULT - NSICDXPASTSURGICALHX_GEN_ALL_CORE_FT
PAST SURGICAL HISTORY:  Coronary stent patent 2014   LOY X1    H/O lithotripsy 18  Right Ureteroscopy/ Laser Lithotripsy/ Stent Insertion  2018 s/p left PCNL    H/O: hysterectomy     MITCHELL and unilateral SO ( benign)    History of  x2     and     S/P laparoscopic cholecystectomy 2001

## 2021-03-30 NOTE — H&P PST ADULT - NSICDXPASTMEDICALHX_GEN_ALL_CORE_FT
PAST MEDICAL HISTORY:  CAD (coronary artery disease) s/p cardiac stent 2014 X 1 ( LOY) @ [facility]    Calculus of kidney b/l    Cardiac murmur     Carotid artery plaque, bilateral mild    COVID-19 april 2020    DM2 (diabetes mellitus, type 2) Off Metformin since '2017. Now diet- managed    Fibroid, uterine ' 88   surgically treated; benign    Heart murmur mild    Hyperlipidemia     Hypertension     Latex allergy     Malignant neoplasm of rectosigmoid (colon)     Morbid obesity BMI  42.4    Stented coronary artery

## 2021-04-01 ENCOUNTER — RESULT REVIEW (OUTPATIENT)
Age: 71
End: 2021-04-01

## 2021-04-02 ENCOUNTER — APPOINTMENT (OUTPATIENT)
Dept: DISASTER EMERGENCY | Facility: CLINIC | Age: 71
End: 2021-04-02

## 2021-04-02 NOTE — ASU PATIENT PROFILE, ADULT - PSH
Coronary stent patent  2014   LOY X1  H/O lithotripsy  18  Right Ureteroscopy/ Laser Lithotripsy/ Stent Insertion  2018 s/p left PCNL  H/O: hysterectomy      MITCHELL and unilateral SO ( benign)  History of   x2     and   S/P laparoscopic cholecystectomy  2001

## 2021-04-03 LAB — SARS-COV-2 N GENE NPH QL NAA+PROBE: NOT DETECTED

## 2021-04-04 ENCOUNTER — TRANSCRIPTION ENCOUNTER (OUTPATIENT)
Age: 71
End: 2021-04-04

## 2021-04-05 ENCOUNTER — RESULT REVIEW (OUTPATIENT)
Age: 71
End: 2021-04-05

## 2021-04-05 ENCOUNTER — INPATIENT (INPATIENT)
Facility: HOSPITAL | Age: 71
LOS: 7 days | Discharge: HOME | End: 2021-04-13
Attending: SURGERY | Admitting: SURGERY
Payer: MEDICARE

## 2021-04-05 VITALS
RESPIRATION RATE: 16 BRPM | HEART RATE: 83 BPM | HEIGHT: 62 IN | OXYGEN SATURATION: 97 % | TEMPERATURE: 98 F | SYSTOLIC BLOOD PRESSURE: 160 MMHG | WEIGHT: 229.94 LBS | DIASTOLIC BLOOD PRESSURE: 63 MMHG

## 2021-04-05 DIAGNOSIS — Z95.5 PRESENCE OF CORONARY ANGIOPLASTY IMPLANT AND GRAFT: Chronic | ICD-10-CM

## 2021-04-05 DIAGNOSIS — C19 MALIGNANT NEOPLASM OF RECTOSIGMOID JUNCTION: ICD-10-CM

## 2021-04-05 DIAGNOSIS — Z90.710 ACQUIRED ABSENCE OF BOTH CERVIX AND UTERUS: Chronic | ICD-10-CM

## 2021-04-05 DIAGNOSIS — Z98.891 HISTORY OF UTERINE SCAR FROM PREVIOUS SURGERY: Chronic | ICD-10-CM

## 2021-04-05 DIAGNOSIS — Z90.49 ACQUIRED ABSENCE OF OTHER SPECIFIED PARTS OF DIGESTIVE TRACT: Chronic | ICD-10-CM

## 2021-04-05 DIAGNOSIS — Z98.890 OTHER SPECIFIED POSTPROCEDURAL STATES: Chronic | ICD-10-CM

## 2021-04-05 LAB
ANION GAP SERPL CALC-SCNC: 14 MMOL/L — SIGNIFICANT CHANGE UP (ref 7–14)
BUN SERPL-MCNC: 19 MG/DL — SIGNIFICANT CHANGE UP (ref 7–23)
CALCIUM SERPL-MCNC: 8.9 MG/DL — SIGNIFICANT CHANGE UP (ref 8.4–10.5)
CHLORIDE SERPL-SCNC: 101 MMOL/L — SIGNIFICANT CHANGE UP (ref 98–107)
CO2 SERPL-SCNC: 23 MMOL/L — SIGNIFICANT CHANGE UP (ref 22–31)
CREAT SERPL-MCNC: 1.08 MG/DL — SIGNIFICANT CHANGE UP (ref 0.5–1.3)
GLUCOSE BLDC GLUCOMTR-MCNC: 123 MG/DL — HIGH (ref 70–99)
GLUCOSE BLDC GLUCOMTR-MCNC: 224 MG/DL — HIGH (ref 70–99)
GLUCOSE SERPL-MCNC: 212 MG/DL — HIGH (ref 70–99)
HCT VFR BLD CALC: 32.7 % — LOW (ref 34.5–45)
HGB BLD-MCNC: 10.9 G/DL — LOW (ref 11.5–15.5)
MAGNESIUM SERPL-MCNC: 1.7 MG/DL — SIGNIFICANT CHANGE UP (ref 1.6–2.6)
MCHC RBC-ENTMCNC: 31.4 PG — SIGNIFICANT CHANGE UP (ref 27–34)
MCHC RBC-ENTMCNC: 33.3 GM/DL — SIGNIFICANT CHANGE UP (ref 32–36)
MCV RBC AUTO: 94.2 FL — SIGNIFICANT CHANGE UP (ref 80–100)
NRBC # BLD: 0 /100 WBCS — SIGNIFICANT CHANGE UP
NRBC # FLD: 0 K/UL — SIGNIFICANT CHANGE UP
PLATELET # BLD AUTO: 430 K/UL — HIGH (ref 150–400)
POTASSIUM SERPL-MCNC: 3.8 MMOL/L — SIGNIFICANT CHANGE UP (ref 3.5–5.3)
POTASSIUM SERPL-SCNC: 3.8 MMOL/L — SIGNIFICANT CHANGE UP (ref 3.5–5.3)
RBC # BLD: 3.47 M/UL — LOW (ref 3.8–5.2)
RBC # FLD: 12.9 % — SIGNIFICANT CHANGE UP (ref 10.3–14.5)
RH IG SCN BLD-IMP: POSITIVE — SIGNIFICANT CHANGE UP
SODIUM SERPL-SCNC: 138 MMOL/L — SIGNIFICANT CHANGE UP (ref 135–145)
WBC # BLD: 15.13 K/UL — HIGH (ref 3.8–10.5)
WBC # FLD AUTO: 15.13 K/UL — HIGH (ref 3.8–10.5)

## 2021-04-05 PROCEDURE — 74018 RADEX ABDOMEN 1 VIEW: CPT | Mod: 26

## 2021-04-05 PROCEDURE — 88309 TISSUE EXAM BY PATHOLOGIST: CPT | Mod: 26

## 2021-04-05 PROCEDURE — 88305 TISSUE EXAM BY PATHOLOGIST: CPT | Mod: 26

## 2021-04-05 RX ORDER — NALOXONE HYDROCHLORIDE 4 MG/.1ML
0.1 SPRAY NASAL
Refills: 0 | Status: DISCONTINUED | OUTPATIENT
Start: 2021-04-05 | End: 2021-04-13

## 2021-04-05 RX ORDER — KETOROLAC TROMETHAMINE 30 MG/ML
15 SYRINGE (ML) INJECTION EVERY 6 HOURS
Refills: 0 | Status: DISCONTINUED | OUTPATIENT
Start: 2021-04-05 | End: 2021-04-05

## 2021-04-05 RX ORDER — ACETAMINOPHEN 500 MG
1000 TABLET ORAL ONCE
Refills: 0 | Status: COMPLETED | OUTPATIENT
Start: 2021-04-06 | End: 2021-04-06

## 2021-04-05 RX ORDER — ENOXAPARIN SODIUM 100 MG/ML
40 INJECTION SUBCUTANEOUS DAILY
Refills: 0 | Status: DISCONTINUED | OUTPATIENT
Start: 2021-04-05 | End: 2021-04-09

## 2021-04-05 RX ORDER — CEFOTETAN DISODIUM 1 G
2 VIAL (EA) INJECTION EVERY 12 HOURS
Refills: 0 | Status: COMPLETED | OUTPATIENT
Start: 2021-04-05 | End: 2021-04-07

## 2021-04-05 RX ORDER — SODIUM CHLORIDE 9 MG/ML
1000 INJECTION, SOLUTION INTRAVENOUS
Refills: 0 | Status: DISCONTINUED | OUTPATIENT
Start: 2021-04-05 | End: 2021-04-13

## 2021-04-05 RX ORDER — HYDROMORPHONE HYDROCHLORIDE 2 MG/ML
0.5 INJECTION INTRAMUSCULAR; INTRAVENOUS; SUBCUTANEOUS
Refills: 0 | Status: DISCONTINUED | OUTPATIENT
Start: 2021-04-05 | End: 2021-04-09

## 2021-04-05 RX ORDER — DEXTROSE 50 % IN WATER 50 %
15 SYRINGE (ML) INTRAVENOUS ONCE
Refills: 0 | Status: DISCONTINUED | OUTPATIENT
Start: 2021-04-05 | End: 2021-04-08

## 2021-04-05 RX ORDER — DEXTROSE 50 % IN WATER 50 %
25 SYRINGE (ML) INTRAVENOUS ONCE
Refills: 0 | Status: DISCONTINUED | OUTPATIENT
Start: 2021-04-05 | End: 2021-04-13

## 2021-04-05 RX ORDER — ACETAMINOPHEN 500 MG
1000 TABLET ORAL ONCE
Refills: 0 | Status: COMPLETED | OUTPATIENT
Start: 2021-04-05 | End: 2021-04-05

## 2021-04-05 RX ORDER — SODIUM CHLORIDE 9 MG/ML
1000 INJECTION, SOLUTION INTRAVENOUS
Refills: 0 | Status: DISCONTINUED | OUTPATIENT
Start: 2021-04-05 | End: 2021-04-07

## 2021-04-05 RX ORDER — METOPROLOL TARTRATE 50 MG
5 TABLET ORAL EVERY 6 HOURS
Refills: 0 | Status: DISCONTINUED | OUTPATIENT
Start: 2021-04-05 | End: 2021-04-09

## 2021-04-05 RX ORDER — GLUCAGON INJECTION, SOLUTION 0.5 MG/.1ML
1 INJECTION, SOLUTION SUBCUTANEOUS ONCE
Refills: 0 | Status: DISCONTINUED | OUTPATIENT
Start: 2021-04-05 | End: 2021-04-13

## 2021-04-05 RX ORDER — ONDANSETRON 8 MG/1
4 TABLET, FILM COATED ORAL ONCE
Refills: 0 | Status: DISCONTINUED | OUTPATIENT
Start: 2021-04-05 | End: 2021-04-05

## 2021-04-05 RX ORDER — HYDROMORPHONE HYDROCHLORIDE 2 MG/ML
30 INJECTION INTRAMUSCULAR; INTRAVENOUS; SUBCUTANEOUS
Refills: 0 | Status: DISCONTINUED | OUTPATIENT
Start: 2021-04-05 | End: 2021-04-09

## 2021-04-05 RX ORDER — DEXTROSE 50 % IN WATER 50 %
12.5 SYRINGE (ML) INTRAVENOUS ONCE
Refills: 0 | Status: DISCONTINUED | OUTPATIENT
Start: 2021-04-05 | End: 2021-04-13

## 2021-04-05 RX ORDER — HYDROMORPHONE HYDROCHLORIDE 2 MG/ML
0.5 INJECTION INTRAMUSCULAR; INTRAVENOUS; SUBCUTANEOUS
Refills: 0 | Status: DISCONTINUED | OUTPATIENT
Start: 2021-04-05 | End: 2021-04-05

## 2021-04-05 RX ORDER — INSULIN LISPRO 100/ML
VIAL (ML) SUBCUTANEOUS EVERY 6 HOURS
Refills: 0 | Status: DISCONTINUED | OUTPATIENT
Start: 2021-04-05 | End: 2021-04-10

## 2021-04-05 RX ORDER — ONDANSETRON 8 MG/1
4 TABLET, FILM COATED ORAL EVERY 6 HOURS
Refills: 0 | Status: DISCONTINUED | OUTPATIENT
Start: 2021-04-05 | End: 2021-04-13

## 2021-04-05 RX ORDER — SODIUM CHLORIDE 9 MG/ML
1000 INJECTION, SOLUTION INTRAVENOUS
Refills: 0 | Status: DISCONTINUED | OUTPATIENT
Start: 2021-04-05 | End: 2021-04-05

## 2021-04-05 RX ADMIN — SODIUM CHLORIDE 125 MILLILITER(S): 9 INJECTION, SOLUTION INTRAVENOUS at 18:45

## 2021-04-05 RX ADMIN — HYDROMORPHONE HYDROCHLORIDE 30 MILLILITER(S): 2 INJECTION INTRAMUSCULAR; INTRAVENOUS; SUBCUTANEOUS at 14:40

## 2021-04-05 RX ADMIN — HYDROMORPHONE HYDROCHLORIDE 0.5 MILLIGRAM(S): 2 INJECTION INTRAMUSCULAR; INTRAVENOUS; SUBCUTANEOUS at 14:45

## 2021-04-05 RX ADMIN — SODIUM CHLORIDE 3 MILLILITER(S): 9 INJECTION INTRAMUSCULAR; INTRAVENOUS; SUBCUTANEOUS at 21:28

## 2021-04-05 RX ADMIN — Medication 5 MILLIGRAM(S): at 23:18

## 2021-04-05 RX ADMIN — Medication 4: at 18:44

## 2021-04-05 RX ADMIN — Medication 5 MILLIGRAM(S): at 17:33

## 2021-04-05 RX ADMIN — HYDROMORPHONE HYDROCHLORIDE 0.5 MILLIGRAM(S): 2 INJECTION INTRAMUSCULAR; INTRAVENOUS; SUBCUTANEOUS at 14:16

## 2021-04-05 RX ADMIN — Medication 100 GRAM(S): at 23:19

## 2021-04-05 RX ADMIN — ENOXAPARIN SODIUM 40 MILLIGRAM(S): 100 INJECTION SUBCUTANEOUS at 18:44

## 2021-04-05 RX ADMIN — HYDROMORPHONE HYDROCHLORIDE 30 MILLILITER(S): 2 INJECTION INTRAMUSCULAR; INTRAVENOUS; SUBCUTANEOUS at 20:27

## 2021-04-05 RX ADMIN — Medication 400 MILLIGRAM(S): at 18:44

## 2021-04-05 RX ADMIN — Medication 1000 MILLIGRAM(S): at 19:00

## 2021-04-05 RX ADMIN — SODIUM CHLORIDE 3 MILLILITER(S): 9 INJECTION INTRAMUSCULAR; INTRAVENOUS; SUBCUTANEOUS at 15:13

## 2021-04-05 NOTE — BRIEF OPERATIVE NOTE - OPERATION/FINDINGS
Laparoscopic left colectomy converted to open given significant adhesions and patients body habitus. After mobilization of sigmoid colon, AYUSH-stapler used to resect proximal end of specimen and TI-60 used to resect specimen at the distal end creating rectal stump. Rectum and left colon were anastomosed using EEA staple. Brendan drain left in pelvis. Fascia closed primarily with running suture and skin closed with staples. Laparoscopic left colectomy, anterior resection converted to open. Mobilization of sigmoid colon. Gifford's anastomosis. Brendan drain left in pelvis. Fascia closed primarily with running suture and skin closed with staples. Laparoscopic converted to open left colectomy, anterior resection with baker's anastomosis. Brendan drain left in pelvis. Fascia closed primarily with running suture and skin closed with staples.

## 2021-04-05 NOTE — CHART NOTE - NSCHARTNOTEFT_GEN_A_CORE
A TEAM SURGERY POST-OP NOTE:    S: Patient underwent and tolerated procedure without issue and sent to PACU. Patient denies chest pain, shortness of breath, nausea, vomiting, lightheadedness, or dizziness. Pain was well controlled.      O:  T(C): 36.4 (04-05-21 @ 18:05), Max: 36.4 (04-05-21 @ 13:30)  HR: 84 (04-05-21 @ 18:05) (73 - 87)  BP: 127/64 (04-05-21 @ 18:05) (108/57 - 145/69)  RR: 16 (04-05-21 @ 18:05) (7 - 18)  SpO2: 98% (04-05-21 @ 18:05) (89% - 100%)  Wt(kg): --                        10.9   15.13 )-----------( 430      ( 05 Apr 2021 16:03 )             32.7        04-05    138  |  101  |  19  ----------------------------<  212<H>  3.8   |  23  |  1.08    Ca    8.9      05 Apr 2021 16:03  Mg     1.7     04-05      Gen: NAD, resting in bed, alert and responding appropriately  Resp: Non-labored respirations  Abd: Soft, nontender, nondistended  Ext:   Vasc:     Assessment/Plan:  70y Female now POD#0 s/p Laparoscopic left colectomy        - Pain control  - Regular diet  - DVT ppx: Lovenox  - Out of bed and encourage early ambulation  - Incentive spirometry    Dispo: Floor    A Team Surgery  [id number] A TEAM SURGERY POST-OP NOTE:    S: Patient underwent and tolerated procedure, required conversion to op Sent to PACU post-op. Patient denies chest pain, shortness of breath, nausea, vomiting, lightheadedness. Patient reports some dizziness after being recently moved from PACU to floor. Pain is well controlled.      O:  T(C): 36.4 (04-05-21 @ 18:05), Max: 36.4 (04-05-21 @ 13:30)  HR: 84 (04-05-21 @ 18:05) (73 - 87)  BP: 127/64 (04-05-21 @ 18:05) (108/57 - 145/69)  RR: 16 (04-05-21 @ 18:05) (7 - 18)  SpO2: 98% (04-05-21 @ 18:05) (89% - 100%)  Wt(kg): --                        10.9   15.13 )-----------( 430      ( 05 Apr 2021 16:03 )             32.7        04-05    138  |  101  |  19  ----------------------------<  212<H>  3.8   |  23  |  1.08    Ca    8.9      05 Apr 2021 16:03  Mg     1.7     04-05      Gen: NAD, resting in bed, alert and responding appropriately  Resp: Non-labored respirations  Abd: Soft, Obese, appropriately tender to palpation. Midline incision with Aquacel dressing c/d/i, post sites covered with opsite dressings c/d/i. GALINA Drain with s/s output  Ext: Grossly moving all extremities normally. B/l hands with 5/5 strength, sensation intact to light touch and pinprick       Assessment/Plan:  70y Female now POD#0 s/p laparoscopic converted to open left hemicolectomy and anterior resection    - Pain control: PCA  - NPO/NGT/IVF  - IV Abx  - hold ASA   - continue Gomez  - DVT ppx: Lovenox  - Incentive spirometry      Dispo: Floor    A Team Surgery  [id number]

## 2021-04-06 LAB
ANION GAP SERPL CALC-SCNC: 12 MMOL/L — SIGNIFICANT CHANGE UP (ref 7–14)
BUN SERPL-MCNC: 24 MG/DL — HIGH (ref 7–23)
CALCIUM SERPL-MCNC: 8.3 MG/DL — LOW (ref 8.4–10.5)
CHLORIDE SERPL-SCNC: 101 MMOL/L — SIGNIFICANT CHANGE UP (ref 98–107)
CO2 SERPL-SCNC: 24 MMOL/L — SIGNIFICANT CHANGE UP (ref 22–31)
COVID-19 SPIKE DOMAIN AB INTERP: POSITIVE
COVID-19 SPIKE DOMAIN ANTIBODY RESULT: >250 U/ML — HIGH
CREAT SERPL-MCNC: 1.49 MG/DL — HIGH (ref 0.5–1.3)
GLUCOSE BLDC GLUCOMTR-MCNC: 117 MG/DL — HIGH (ref 70–99)
GLUCOSE BLDC GLUCOMTR-MCNC: 143 MG/DL — HIGH (ref 70–99)
GLUCOSE BLDC GLUCOMTR-MCNC: 183 MG/DL — HIGH (ref 70–99)
GLUCOSE BLDC GLUCOMTR-MCNC: 97 MG/DL — SIGNIFICANT CHANGE UP (ref 70–99)
GLUCOSE SERPL-MCNC: 163 MG/DL — HIGH (ref 70–99)
HCT VFR BLD CALC: 27.3 % — LOW (ref 34.5–45)
HCT VFR BLD CALC: 27.8 % — LOW (ref 34.5–45)
HGB BLD-MCNC: 9 G/DL — LOW (ref 11.5–15.5)
HGB BLD-MCNC: 9.1 G/DL — LOW (ref 11.5–15.5)
MAGNESIUM SERPL-MCNC: 1.8 MG/DL — SIGNIFICANT CHANGE UP (ref 1.6–2.6)
MCHC RBC-ENTMCNC: 31.1 PG — SIGNIFICANT CHANGE UP (ref 27–34)
MCHC RBC-ENTMCNC: 31.3 PG — SIGNIFICANT CHANGE UP (ref 27–34)
MCHC RBC-ENTMCNC: 32.7 GM/DL — SIGNIFICANT CHANGE UP (ref 32–36)
MCHC RBC-ENTMCNC: 33 GM/DL — SIGNIFICANT CHANGE UP (ref 32–36)
MCV RBC AUTO: 94.8 FL — SIGNIFICANT CHANGE UP (ref 80–100)
MCV RBC AUTO: 94.9 FL — SIGNIFICANT CHANGE UP (ref 80–100)
NRBC # BLD: 0 /100 WBCS — SIGNIFICANT CHANGE UP
NRBC # BLD: 0 /100 WBCS — SIGNIFICANT CHANGE UP
NRBC # FLD: 0 K/UL — SIGNIFICANT CHANGE UP
NRBC # FLD: 0 K/UL — SIGNIFICANT CHANGE UP
PHOSPHATE SERPL-MCNC: 4.2 MG/DL — SIGNIFICANT CHANGE UP (ref 2.5–4.5)
PLATELET # BLD AUTO: 355 K/UL — SIGNIFICANT CHANGE UP (ref 150–400)
PLATELET # BLD AUTO: 384 K/UL — SIGNIFICANT CHANGE UP (ref 150–400)
POTASSIUM SERPL-MCNC: 4.3 MMOL/L — SIGNIFICANT CHANGE UP (ref 3.5–5.3)
POTASSIUM SERPL-SCNC: 4.3 MMOL/L — SIGNIFICANT CHANGE UP (ref 3.5–5.3)
RBC # BLD: 2.88 M/UL — LOW (ref 3.8–5.2)
RBC # BLD: 2.93 M/UL — LOW (ref 3.8–5.2)
RBC # FLD: 13.1 % — SIGNIFICANT CHANGE UP (ref 10.3–14.5)
RBC # FLD: 13.2 % — SIGNIFICANT CHANGE UP (ref 10.3–14.5)
SARS-COV-2 IGG+IGM SERPL QL IA: >250 U/ML — HIGH
SARS-COV-2 IGG+IGM SERPL QL IA: POSITIVE
SODIUM SERPL-SCNC: 137 MMOL/L — SIGNIFICANT CHANGE UP (ref 135–145)
WBC # BLD: 13.39 K/UL — HIGH (ref 3.8–10.5)
WBC # BLD: 14.2 K/UL — HIGH (ref 3.8–10.5)
WBC # FLD AUTO: 13.39 K/UL — HIGH (ref 3.8–10.5)
WBC # FLD AUTO: 14.2 K/UL — HIGH (ref 3.8–10.5)

## 2021-04-06 RX ORDER — CHLORHEXIDINE GLUCONATE 213 G/1000ML
1 SOLUTION TOPICAL
Refills: 0 | Status: DISCONTINUED | OUTPATIENT
Start: 2021-04-06 | End: 2021-04-13

## 2021-04-06 RX ORDER — SODIUM CHLORIDE 9 MG/ML
1000 INJECTION, SOLUTION INTRAVENOUS ONCE
Refills: 0 | Status: COMPLETED | OUTPATIENT
Start: 2021-04-06 | End: 2021-04-06

## 2021-04-06 RX ORDER — MAGNESIUM SULFATE 500 MG/ML
2 VIAL (ML) INJECTION ONCE
Refills: 0 | Status: COMPLETED | OUTPATIENT
Start: 2021-04-06 | End: 2021-04-06

## 2021-04-06 RX ADMIN — Medication 50 GRAM(S): at 10:58

## 2021-04-06 RX ADMIN — ENOXAPARIN SODIUM 40 MILLIGRAM(S): 100 INJECTION SUBCUTANEOUS at 11:56

## 2021-04-06 RX ADMIN — Medication 5 MILLIGRAM(S): at 18:36

## 2021-04-06 RX ADMIN — HYDROMORPHONE HYDROCHLORIDE 30 MILLILITER(S): 2 INJECTION INTRAMUSCULAR; INTRAVENOUS; SUBCUTANEOUS at 07:54

## 2021-04-06 RX ADMIN — Medication 1000 MILLIGRAM(S): at 06:05

## 2021-04-06 RX ADMIN — SODIUM CHLORIDE 3 MILLILITER(S): 9 INJECTION INTRAMUSCULAR; INTRAVENOUS; SUBCUTANEOUS at 05:49

## 2021-04-06 RX ADMIN — SODIUM CHLORIDE 125 MILLILITER(S): 9 INJECTION, SOLUTION INTRAVENOUS at 10:58

## 2021-04-06 RX ADMIN — HYDROMORPHONE HYDROCHLORIDE 30 MILLILITER(S): 2 INJECTION INTRAMUSCULAR; INTRAVENOUS; SUBCUTANEOUS at 20:36

## 2021-04-06 RX ADMIN — SODIUM CHLORIDE 1000 MILLILITER(S): 9 INJECTION, SOLUTION INTRAVENOUS at 07:55

## 2021-04-06 RX ADMIN — SODIUM CHLORIDE 100 MILLILITER(S): 9 INJECTION, SOLUTION INTRAVENOUS at 19:54

## 2021-04-06 RX ADMIN — Medication 400 MILLIGRAM(S): at 05:50

## 2021-04-06 RX ADMIN — Medication 5 MILLIGRAM(S): at 05:51

## 2021-04-06 RX ADMIN — Medication 2: at 06:53

## 2021-04-06 RX ADMIN — Medication 5 MILLIGRAM(S): at 11:56

## 2021-04-06 RX ADMIN — Medication 100 GRAM(S): at 10:57

## 2021-04-06 RX ADMIN — SODIUM CHLORIDE 3 MILLILITER(S): 9 INJECTION INTRAMUSCULAR; INTRAVENOUS; SUBCUTANEOUS at 21:36

## 2021-04-06 RX ADMIN — Medication 400 MILLIGRAM(S): at 12:30

## 2021-04-06 NOTE — PROGRESS NOTE ADULT - ASSESSMENT
Patient seen and examined with Dr. Avalos.    70 year old Jehovah witness, PMHx HTN, HLD, Type 2 DM, CAD, and recent dx of malignant neoplasm of rectosigmoid colon, s/p laparoscopic converted to open left hemicolectomy and anterior resection, POD#1, recovering appropriately on the floors.    PLAN:  - NPO/IVP  - Fluid bolus for Oliguria  - c/w beltran cath  - Incentive spirometry  - Pain control: PCA  - VTE prophylaxis with Heparin subcutaneous    A Team Surgery [id number] Patient seen and examined with Dr. Avalos.    70 year old Jehovah witness, PMHx HTN, HLD, Type 2 DM, CAD, and recent dx of malignant neoplasm of rectosigmoid colon, s/p laparoscopic converted to open left hemicolectomy and anterior resection, POD#1, recovering appropriately on the floors.    PLAN:  - NPO/IVP  - Fluid bolus for Oliguria  - c/w beltran cath  - Incentive spirometry  - Pain control: PCA  - VTE prophylaxis with Heparin subcutaneous    A Team Surgery [id number]  Patient seen and examined with Dr. Avalos.    70 year old Jehovah witness, PMHx HTN, HLD, Type 2 DM, CAD, and recent dx of malignant neoplasm of rectosigmoid colon, s/p laparoscopic converted to open left hemicolectomy and anterior resection, POD#1, recovering appropriately on the floors.    PLAN:  - NPO/NGT/IVF  - Fluid bolus for Oliguria  - c/w beltran cath  - Incentive spirometry  - Pain control: PCA  - VTE prophylaxis with Heparin subcutaneous    A Team Surgery [id number]  Patient seen and examined with Dr. Avalos.    70 year old Jehovah witness, PMHx HTN, HLD, Type 2 DM, CAD, and recent dx of malignant neoplasm of left colon, s/p laparoscopic converted to open left hemicolectomy, POD#1, recovering appropriately on the floors.    PLAN:  - NPO/NGT/IVF  - Fluid bolus for Oliguria  - c/w beltran cath  - Incentive spirometry  - Pain control: PCA  - VTE prophylaxis with Heparin subcutaneous    A Team Surgery [id number]

## 2021-04-06 NOTE — PHYSICAL THERAPY INITIAL EVALUATION ADULT - ADDITIONAL COMMENTS
Pt. was left seated in recliner chair post PT Evaluation, no apparent distress, all lines intact, call bell within reach. Tina AGUIRRE aware of pt. status and participation in PT.

## 2021-04-06 NOTE — PROVIDER CONTACT NOTE (OTHER) - ASSESSMENT
patient stable laying bed in. vitals were are stable. no complaints of pain.
patient stable laying bed in. vitals were are stable. no complaints of pain.

## 2021-04-06 NOTE — PROGRESS NOTE ADULT - SUBJECTIVE AND OBJECTIVE BOX
SURGERY PROGRESS NOTE    SUBJECTIVE / 24H EVENTS:  Patient seen and examined on morning rounds. No acute events overnight.      OBJECTIVE:  VITAL SIGNS:  T(C): 37.3 (04-05-21 @ 22:55), Max: 37.3 (04-05-21 @ 22:55)  HR: 77 (04-05-21 @ 22:55) (73 - 87)  BP: 149/60 (04-05-21 @ 22:55) (108/57 - 160/63)  RR: 16 (04-05-21 @ 22:55) (7 - 18)  SpO2: 100% (04-05-21 @ 22:55) (89% - 100%)  Daily Height in cm: 157.48 (05 Apr 2021 06:42)    Daily   POCT Blood Glucose.: 224 mg/dL (04-05-21 @ 18:05)  POCT Blood Glucose.: 123 mg/dL (04-05-21 @ 06:44)      PHYSICAL EXAM:  Gen: NAD, resting in bed, alert and responding appropriately  Resp: Non-labored respirations  Abd: Soft, Obese, appropriately tender to palpation. Midline incision with Aquacel dressing c/d/i, post sites covered with opsite dressings c/d/i. GALINA Drain with s/s output  Ext: Grossly moving all extremities normally      04-05-21 @ 07:01  -  04-06-21 @ 01:11  --------------------------------------------------------  IN:    Lactated Ringers: 375 mL  Total IN: 375 mL    OUT:    Bulb (mL): 55 mL    Indwelling Catheter - Urethral (mL): 550 mL  Total OUT: 605 mL    Total NET: -230 mL          LAB VALUES:  04-05    138  |  101  |  19  ----------------------------<  212<H>  3.8   |  23  |  1.08    Ca    8.9      05 Apr 2021 16:03  Mg     1.7     04-05                                 10.9   15.13 )-----------( 430      ( 05 Apr 2021 16:03 )             32.7                   MICROBIOLOGY:      RADIOLOGY:  PACS Image: Image(s) Available (04-05-21 @ 12:55)        MEDICATIONS  (STANDING):  acetaminophen  IVPB .. 1000 milliGRAM(s) IV Intermittent once  acetaminophen  IVPB .. 1000 milliGRAM(s) IV Intermittent once  cefoTEtan  IVPB 2 Gram(s) IV Intermittent every 12 hours  dextrose 40% Gel 15 Gram(s) Oral once  dextrose 5%. 1000 milliLiter(s) (50 mL/Hr) IV Continuous <Continuous>  dextrose 5%. 1000 milliLiter(s) (100 mL/Hr) IV Continuous <Continuous>  dextrose 50% Injectable 25 Gram(s) IV Push once  dextrose 50% Injectable 12.5 Gram(s) IV Push once  dextrose 50% Injectable 25 Gram(s) IV Push once  enoxaparin Injectable 40 milliGRAM(s) SubCutaneous daily  glucagon  Injectable 1 milliGRAM(s) IntraMuscular once  HYDROmorphone PCA (1 mG/mL) 30 milliLiter(s) PCA Continuous PCA Continuous  insulin lispro (ADMELOG) corrective regimen sliding scale   SubCutaneous every 6 hours  lactated ringers. 1000 milliLiter(s) (125 mL/Hr) IV Continuous <Continuous>  metoprolol tartrate Injectable 5 milliGRAM(s) IV Push every 6 hours  sodium chloride 0.9% lock flush 3 milliLiter(s) IV Push every 8 hours    MEDICATIONS  (PRN):  HYDROmorphone PCA (1 mG/mL) Rescue Clinician Bolus 0.5 milliGRAM(s) IV Push every 15 minutes PRN for Pain Scale GREATER THAN 6  naloxone Injectable 0.1 milliGRAM(s) IV Push every 3 minutes PRN For ANY of the following changes in patient status:  A. RR LESS THAN 10 breaths per minute, B. Oxygen saturation LESS THAN 90%, C. Sedation score of 6  ondansetron Injectable 4 milliGRAM(s) IV Push every 6 hours PRN Nausea        SURGERY PROGRESS NOTE    SUBJECTIVE / 24H EVENTS:  Patient seen and examined on morning rounds. No acute events overnight.      OBJECTIVE:  VITAL SIGNS:  ICU Vital Signs Last 24 Hrs  T(C): 36.5 (06 Apr 2021 13:57), Max: 37.3 (05 Apr 2021 22:55)  T(F): 97.7 (06 Apr 2021 13:57), Max: 99.1 (05 Apr 2021 22:55)  HR: 84 (06 Apr 2021 13:57) (70 - 87)  BP: 140/67 (06 Apr 2021 13:57) (113/58 - 149/60)  BP(mean): 86 (05 Apr 2021 17:15) (79 - 90)  RR: 16 (06 Apr 2021 13:57) (9 - 18)  SpO2: 100% (06 Apr 2021 13:57) (89% - 100%)      PHYSICAL EXAM:  Gen: NAD, resting in bed, alert and responding appropriately  Resp: Non-labored respirations  Abd: Soft, Obese, appropriately tender to palpation. Midline incision with Aquacel dressing c/d/i, post sites covered with opsite dressings c/d/i. GALINA Drain in RLQ with s/s output  Ext: Grossly moving all extremities normally    I&O's Detail    05 Apr 2021 07:01  -  06 Apr 2021 07:00  --------------------------------------------------------  IN:    IV PiggyBack: 200 mL    Lactated Ringers: 1875 mL  Total IN: 2075 mL    OUT:    Bulb (mL): 87.5 mL    Indwelling Catheter - Urethral (mL): 550 mL    Oral Fluid: 0 mL  Total OUT: 637.5 mL    Total NET: 1437.5 mL      06 Apr 2021 07:01  -  06 Apr 2021 14:11  --------------------------------------------------------  IN:    IV PiggyBack: 100 mL    Lactated Ringers: 500 mL  Total IN: 600 mL    OUT:    Bulb (mL): 20 mL    Indwelling Catheter - Urethral (mL): 400 mL    Nasogastric/Oral tube (mL): 200 mL  Total OUT: 620 mL    Total NET: -20 mL        LAB VALUES:                        9.1    13.39 )-----------( 355      ( 06 Apr 2021 12:16 )             27.8   04-06    137  |  101  |  24<H>  ----------------------------<  163<H>  4.3   |  24  |  1.49<H>    Ca    8.3<L>      06 Apr 2021 07:54  Phos  4.2     04-06  Mg     1.8     04-06    MEDICATIONS  (STANDING):  cefoTEtan  IVPB 2 Gram(s) IV Intermittent every 12 hours  dextrose 40% Gel 15 Gram(s) Oral once  dextrose 5%. 1000 milliLiter(s) (50 mL/Hr) IV Continuous <Continuous>  dextrose 5%. 1000 milliLiter(s) (100 mL/Hr) IV Continuous <Continuous>  dextrose 50% Injectable 25 Gram(s) IV Push once  dextrose 50% Injectable 12.5 Gram(s) IV Push once  dextrose 50% Injectable 25 Gram(s) IV Push once  enoxaparin Injectable 40 milliGRAM(s) SubCutaneous daily  glucagon  Injectable 1 milliGRAM(s) IntraMuscular once  HYDROmorphone PCA (1 mG/mL) 30 milliLiter(s) PCA Continuous PCA Continuous  insulin lispro (ADMELOG) corrective regimen sliding scale   SubCutaneous every 6 hours  lactated ringers. 1000 milliLiter(s) (125 mL/Hr) IV Continuous <Continuous>  metoprolol tartrate Injectable 5 milliGRAM(s) IV Push every 6 hours  sodium chloride 0.9% lock flush 3 milliLiter(s) IV Push every 8 hours    MEDICATIONS  (PRN):  HYDROmorphone PCA (1 mG/mL) Rescue Clinician Bolus 0.5 milliGRAM(s) IV Push every 15 minutes PRN for Pain Scale GREATER THAN 6  naloxone Injectable 0.1 milliGRAM(s) IV Push every 3 minutes PRN For ANY of the following changes in patient status:  A. RR LESS THAN 10 breaths per minute, B. Oxygen saturation LESS THAN 90%, C. Sedation score of 6  ondansetron Injectable 4 milliGRAM(s) IV Push every 6 hours PRN Nausea

## 2021-04-06 NOTE — PROGRESS NOTE ADULT - SUBJECTIVE AND OBJECTIVE BOX
DATE OF SERVICE: 04-06-21 @ 08:02    INTERVAL HPI/OVERNIGHT EVENTS: Patient seen and examined, resting comfortably at bedside awake and alert. Patient reported oliguric overnigh(t ~200ml) . Patient with NGT denies any nausea or vomiting. Denies passing flatus or having bowel movements. Denies fever, chills, SOB, or chest pain. Pain well controlled with PCA.    STATUS POST:  Left Hemicolectomy and anterior resection    POST OPERATIVE DAY #: 1    MEDICATIONS  (STANDING):  cefoTEtan  IVPB 2 Gram(s) IV Intermittent every 12 hours  dextrose 40% Gel 15 Gram(s) Oral once  dextrose 5%. 1000 milliLiter(s) (50 mL/Hr) IV Continuous <Continuous>  dextrose 5%. 1000 milliLiter(s) (100 mL/Hr) IV Continuous <Continuous>  dextrose 50% Injectable 25 Gram(s) IV Push once  dextrose 50% Injectable 12.5 Gram(s) IV Push once  dextrose 50% Injectable 25 Gram(s) IV Push once  enoxaparin Injectable 40 milliGRAM(s) SubCutaneous daily  glucagon  Injectable 1 milliGRAM(s) IntraMuscular once  HYDROmorphone PCA (1 mG/mL) 30 milliLiter(s) PCA Continuous PCA Continuous  insulin lispro (ADMELOG) corrective regimen sliding scale   SubCutaneous every 6 hours  lactated ringers. 1000 milliLiter(s) (125 mL/Hr) IV Continuous <Continuous>  metoprolol tartrate Injectable 5 milliGRAM(s) IV Push every 6 hours  sodium chloride 0.9% lock flush 3 milliLiter(s) IV Push every 8 hours    MEDICATIONS  (PRN):  HYDROmorphone PCA (1 mG/mL) Rescue Clinician Bolus 0.5 milliGRAM(s) IV Push every 15 minutes PRN for Pain Scale GREATER THAN 6  naloxone Injectable 0.1 milliGRAM(s) IV Push every 3 minutes PRN For ANY of the following changes in patient status:  A. RR LESS THAN 10 breaths per minute, B. Oxygen saturation LESS THAN 90%, C. Sedation score of 6  ondansetron Injectable 4 milliGRAM(s) IV Push every 6 hours PRN Nausea      Vital Signs Last 24 Hrs  T(C): 37.1 (06 Apr 2021 05:14), Max: 37.3 (05 Apr 2021 22:55)  T(F): 98.7 (06 Apr 2021 05:14), Max: 99.1 (05 Apr 2021 22:55)  HR: 73 (06 Apr 2021 05:14) (70 - 87)  BP: 128/63 (06 Apr 2021 05:14) (108/57 - 149/60)  BP(mean): 86 (05 Apr 2021 17:15) (68 - 90)  RR: 17 (06 Apr 2021 05:14) (7 - 18)  SpO2: 100% (06 Apr 2021 05:14) (89% - 100%)  I&O's Detail    05 Apr 2021 07:01  -  06 Apr 2021 07:00  --------------------------------------------------------  IN:    IV PiggyBack: 200 mL    Lactated Ringers: 1875 mL  Total IN: 2075 mL    OUT:    Bulb (mL): 87.5 mL    Indwelling Catheter - Urethral (mL): 550 mL    Oral Fluid: 0 mL  Total OUT: 637.5 mL    Total NET: 1437.5 mL          REVIEW OF SYSTEMS:  CONSTITUTIONAL: No weakness, fevers or chills  EYES/ENT: No visual changes;  No vertigo or throat pain   NECK: No pain or stiffness  RESPIRATORY: No cough, wheezing, hemoptysis; No shortness of breath  CARDIOVASCULAR: No chest pain or palpitations  GASTROINTESTINAL: No abdominal or epigastric pain. No nausea, vomiting, or hematemesis; No diarrhea or constipation. No melena or hematochezia.  GENITOURINARY: No dysuria, frequency or hematuria  NEUROLOGICAL: No numbness or weakness  SKIN: No itching, burning, rashes, or lesions   All other review of systems is negative unless indicated above.    Physical Exam:  General: WN/WD NAD  Respiratory: airway patent, respirations unlabored, no increased WoB  Neurology: A&Ox3, nonfocal, PERSON x 4  Abdominal: Soft, obese, appropriately tender to palpation, no rebounding or guarding  Midline incision dressed with Aquacel: C/D/I, minimal strikethrough  Port Incisions dressed with OpSites: C/D/I, no strikethrough  GALINA Drain: SSF output, more sanguinous than serous  Gomez: patent, in place with minimal straw-colored urine    LABS:                        10.9   15.13 )-----------( 430      ( 05 Apr 2021 16:03 )             32.7     04-05    138  |  101  |  19  ----------------------------<  212<H>  3.8   |  23  |  1.08    Ca    8.9      05 Apr 2021 16:03  Mg     1.7     04-05   DATE OF SERVICE: 04-06-21 @ 08:02    INTERVAL HPI/OVERNIGHT EVENTS: Patient seen and examined, resting comfortably at bedside awake and alert. Patient reported oliguric overnigh(t ~200ml) . Patient with NGT denies any nausea or vomiting. Denies passing flatus or having bowel movements. Denies fever, chills, SOB, or chest pain. Pain well controlled with PCA.    STATUS POST:  Left Hemicolectomy and anterior resection    POST OPERATIVE DAY #: 1    MEDICATIONS  (STANDING):  cefoTEtan  IVPB 2 Gram(s) IV Intermittent every 12 hours  dextrose 40% Gel 15 Gram(s) Oral once  dextrose 5%. 1000 milliLiter(s) (50 mL/Hr) IV Continuous <Continuous>  dextrose 5%. 1000 milliLiter(s) (100 mL/Hr) IV Continuous <Continuous>  dextrose 50% Injectable 25 Gram(s) IV Push once  dextrose 50% Injectable 12.5 Gram(s) IV Push once  dextrose 50% Injectable 25 Gram(s) IV Push once  enoxaparin Injectable 40 milliGRAM(s) SubCutaneous daily  glucagon  Injectable 1 milliGRAM(s) IntraMuscular once  HYDROmorphone PCA (1 mG/mL) 30 milliLiter(s) PCA Continuous PCA Continuous  insulin lispro (ADMELOG) corrective regimen sliding scale   SubCutaneous every 6 hours  lactated ringers. 1000 milliLiter(s) (125 mL/Hr) IV Continuous <Continuous>  metoprolol tartrate Injectable 5 milliGRAM(s) IV Push every 6 hours  sodium chloride 0.9% lock flush 3 milliLiter(s) IV Push every 8 hours    MEDICATIONS  (PRN):  HYDROmorphone PCA (1 mG/mL) Rescue Clinician Bolus 0.5 milliGRAM(s) IV Push every 15 minutes PRN for Pain Scale GREATER THAN 6  naloxone Injectable 0.1 milliGRAM(s) IV Push every 3 minutes PRN For ANY of the following changes in patient status:  A. RR LESS THAN 10 breaths per minute, B. Oxygen saturation LESS THAN 90%, C. Sedation score of 6  ondansetron Injectable 4 milliGRAM(s) IV Push every 6 hours PRN Nausea      Vital Signs Last 24 Hrs  T(C): 37.1 (06 Apr 2021 05:14), Max: 37.3 (05 Apr 2021 22:55)  T(F): 98.7 (06 Apr 2021 05:14), Max: 99.1 (05 Apr 2021 22:55)  HR: 73 (06 Apr 2021 05:14) (70 - 87)  BP: 128/63 (06 Apr 2021 05:14) (108/57 - 149/60)  BP(mean): 86 (05 Apr 2021 17:15) (68 - 90)  RR: 17 (06 Apr 2021 05:14) (7 - 18)  SpO2: 100% (06 Apr 2021 05:14) (89% - 100%)  I&O's Detail    05 Apr 2021 07:01  -  06 Apr 2021 07:00  --------------------------------------------------------  IN:    IV PiggyBack: 200 mL    Lactated Ringers: 1875 mL  Total IN: 2075 mL    OUT:    Bulb (mL): 87.5 mL    Indwelling Catheter - Urethral (mL): 550 mL    Oral Fluid: 0 mL  Total OUT: 637.5 mL    Total NET: 1437.5 mL          REVIEW OF SYSTEMS:  CONSTITUTIONAL: No weakness, fevers or chills  EYES/ENT: No visual changes;  No vertigo or throat pain   NECK: No pain or stiffness  RESPIRATORY: No cough, wheezing, hemoptysis; No shortness of breath  CARDIOVASCULAR: No chest pain or palpitations  GASTROINTESTINAL: No abdominal or epigastric pain. No nausea, vomiting, or hematemesis; No diarrhea or constipation. No melena or hematochezia.  GENITOURINARY: No dysuria, frequency or hematuria  NEUROLOGICAL: No numbness or weakness  SKIN: No itching, burning, rashes, or lesions   All other review of systems is negative unless indicated above.    Physical Exam:  General: WN/WD NAD  Respiratory: airway patent, respirations unlabored, no increased WoB  Neurology: A&Ox3, nonfocal, PERSON x 4  Abdominal: Soft, obese, appropriately tender to palpation, no rebounding or guarding  Midline incision dressed with Aquacel: C/D/I, minimal strikethrough  Port Incisions dressed with OpSites: C/D/I, no strikethrough  GALINA Drain: SSF output, more sanguinous than serous  Gomez: patent, in place with minimal straw-colored urine  NGT to wall suction patent and functioning with dark green bilious output    LABS:                        10.9   15.13 )-----------( 430      ( 05 Apr 2021 16:03 )             32.7     04-05    138  |  101  |  19  ----------------------------<  212<H>  3.8   |  23  |  1.08    Ca    8.9      05 Apr 2021 16:03  Mg     1.7     04-05   DATE OF SERVICE: 04-06-21 @ 08:02    INTERVAL HPI/OVERNIGHT EVENTS: Patient seen and examined, resting comfortably at bedside awake and alert. Patient reported oliguric overnigh(t ~200ml) . Patient with NGT denies any nausea or vomiting. Denies passing flatus or having bowel movements. Denies fever, chills, SOB, or chest pain. Pain well controlled with PCA.    STATUS POST:  Left Hemicolectomy     POST OPERATIVE DAY #: 1    MEDICATIONS  (STANDING):  cefoTEtan  IVPB 2 Gram(s) IV Intermittent every 12 hours  dextrose 40% Gel 15 Gram(s) Oral once  dextrose 5%. 1000 milliLiter(s) (50 mL/Hr) IV Continuous <Continuous>  dextrose 5%. 1000 milliLiter(s) (100 mL/Hr) IV Continuous <Continuous>  dextrose 50% Injectable 25 Gram(s) IV Push once  dextrose 50% Injectable 12.5 Gram(s) IV Push once  dextrose 50% Injectable 25 Gram(s) IV Push once  enoxaparin Injectable 40 milliGRAM(s) SubCutaneous daily  glucagon  Injectable 1 milliGRAM(s) IntraMuscular once  HYDROmorphone PCA (1 mG/mL) 30 milliLiter(s) PCA Continuous PCA Continuous  insulin lispro (ADMELOG) corrective regimen sliding scale   SubCutaneous every 6 hours  lactated ringers. 1000 milliLiter(s) (125 mL/Hr) IV Continuous <Continuous>  metoprolol tartrate Injectable 5 milliGRAM(s) IV Push every 6 hours  sodium chloride 0.9% lock flush 3 milliLiter(s) IV Push every 8 hours    MEDICATIONS  (PRN):  HYDROmorphone PCA (1 mG/mL) Rescue Clinician Bolus 0.5 milliGRAM(s) IV Push every 15 minutes PRN for Pain Scale GREATER THAN 6  naloxone Injectable 0.1 milliGRAM(s) IV Push every 3 minutes PRN For ANY of the following changes in patient status:  A. RR LESS THAN 10 breaths per minute, B. Oxygen saturation LESS THAN 90%, C. Sedation score of 6  ondansetron Injectable 4 milliGRAM(s) IV Push every 6 hours PRN Nausea      Vital Signs Last 24 Hrs  T(C): 37.1 (06 Apr 2021 05:14), Max: 37.3 (05 Apr 2021 22:55)  T(F): 98.7 (06 Apr 2021 05:14), Max: 99.1 (05 Apr 2021 22:55)  HR: 73 (06 Apr 2021 05:14) (70 - 87)  BP: 128/63 (06 Apr 2021 05:14) (108/57 - 149/60)  BP(mean): 86 (05 Apr 2021 17:15) (68 - 90)  RR: 17 (06 Apr 2021 05:14) (7 - 18)  SpO2: 100% (06 Apr 2021 05:14) (89% - 100%)  I&O's Detail    05 Apr 2021 07:01  -  06 Apr 2021 07:00  --------------------------------------------------------  IN:    IV PiggyBack: 200 mL    Lactated Ringers: 1875 mL  Total IN: 2075 mL    OUT:    Bulb (mL): 87.5 mL    Indwelling Catheter - Urethral (mL): 550 mL    Oral Fluid: 0 mL  Total OUT: 637.5 mL    Total NET: 1437.5 mL          REVIEW OF SYSTEMS:  CONSTITUTIONAL: No weakness, fevers or chills  EYES/ENT: No visual changes;  No vertigo or throat pain   NECK: No pain or stiffness  RESPIRATORY: No cough, wheezing, hemoptysis; No shortness of breath  CARDIOVASCULAR: No chest pain or palpitations  GASTROINTESTINAL: No abdominal or epigastric pain. No nausea, vomiting, or hematemesis; No diarrhea or constipation. No melena or hematochezia.  GENITOURINARY: No dysuria, frequency or hematuria  NEUROLOGICAL: No numbness or weakness  SKIN: No itching, burning, rashes, or lesions   All other review of systems is negative unless indicated above.    Physical Exam:  General: WN/WD NAD  Respiratory: airway patent, respirations unlabored, no increased WoB  Neurology: A&Ox3, nonfocal, PERSON x 4  Abdominal: Soft, obese, appropriately tender to palpation, no rebounding or guarding  Midline incision dressed with Aquacel: C/D/I, minimal strikethrough  Port Incisions dressed with OpSites: C/D/I, no strikethrough  GALINA Drain: SSF output,   Gomez: patent, in place   NGT to wall suction patent and functioning with dark green bilious output    LABS:                        10.9   15.13 )-----------( 430      ( 05 Apr 2021 16:03 )             32.7     04-05    138  |  101  |  19  ----------------------------<  212<H>  3.8   |  23  |  1.08    Ca    8.9      05 Apr 2021 16:03  Mg     1.7     04-05

## 2021-04-06 NOTE — PROGRESS NOTE ADULT - SUBJECTIVE AND OBJECTIVE BOX
Anesthesia Pain Management Service    SUBJECTIVE: Patient is doing well with IV PCA and no significant problems reported.    Pain Scale Score	At rest: 2___ 	With Activity: ___ 	[X ] Refer to charted pain scores    THERAPY:    [ ] IV PCA Morphine		[ ] 5 mg/mL	[ ] 1 mg/mL  [X ] IV PCA Hydromorphone	[ ] 5 mg/mL	[X ] 1 mg/mL  [ ] IV PCA Fentanyl		[ ] 50 micrograms/mL    Demand dose __0.2_ lockout __6_ (minutes) Continuous Rate _0__ Total: __3.2_  mg used (in past 24 hours)      MEDICATIONS  (STANDING):  cefoTEtan  IVPB 2 Gram(s) IV Intermittent every 12 hours  dextrose 40% Gel 15 Gram(s) Oral once  dextrose 5%. 1000 milliLiter(s) (50 mL/Hr) IV Continuous <Continuous>  dextrose 5%. 1000 milliLiter(s) (100 mL/Hr) IV Continuous <Continuous>  dextrose 50% Injectable 25 Gram(s) IV Push once  dextrose 50% Injectable 12.5 Gram(s) IV Push once  dextrose 50% Injectable 25 Gram(s) IV Push once  enoxaparin Injectable 40 milliGRAM(s) SubCutaneous daily  glucagon  Injectable 1 milliGRAM(s) IntraMuscular once  HYDROmorphone PCA (1 mG/mL) 30 milliLiter(s) PCA Continuous PCA Continuous  insulin lispro (ADMELOG) corrective regimen sliding scale   SubCutaneous every 6 hours  lactated ringers. 1000 milliLiter(s) (125 mL/Hr) IV Continuous <Continuous>  magnesium sulfate  IVPB 2 Gram(s) IV Intermittent once  metoprolol tartrate Injectable 5 milliGRAM(s) IV Push every 6 hours  sodium chloride 0.9% lock flush 3 milliLiter(s) IV Push every 8 hours    MEDICATIONS  (PRN):  HYDROmorphone PCA (1 mG/mL) Rescue Clinician Bolus 0.5 milliGRAM(s) IV Push every 15 minutes PRN for Pain Scale GREATER THAN 6  naloxone Injectable 0.1 milliGRAM(s) IV Push every 3 minutes PRN For ANY of the following changes in patient status:  A. RR LESS THAN 10 breaths per minute, B. Oxygen saturation LESS THAN 90%, C. Sedation score of 6  ondansetron Injectable 4 milliGRAM(s) IV Push every 6 hours PRN Nausea      OBJECTIVE: laying in bed     Sedation Score:	[ X] Alert	[ ] Drowsy 	[ ] Arousable	[ ] Asleep	[ ] Unresponsive    Side Effects:	[X ] None	[ ] Nausea	[ ] Vomiting	[ ] Pruritus  		[ ] Other:    Vital Signs Last 24 Hrs  T(C): 37.1 (06 Apr 2021 09:35), Max: 37.3 (05 Apr 2021 22:55)  T(F): 98.8 (06 Apr 2021 09:35), Max: 99.1 (05 Apr 2021 22:55)  HR: 74 (06 Apr 2021 09:35) (70 - 87)  BP: 124/56 (06 Apr 2021 09:35) (108/57 - 149/60)  BP(mean): 86 (05 Apr 2021 17:15) (68 - 90)  RR: 18 (06 Apr 2021 09:35) (7 - 18)  SpO2: 95% (06 Apr 2021 09:35) (89% - 100%)    ASSESSMENT/ PLAN    Therapy to  be:	[ X] Continue   [ ] Discontinued   [ ] Change to prn Analgesics    Documentation and Verification of current medications:   [X] Done	[ ] Not done, not elligible    Comments: Recommend non-opioid adjuvant analgesics to be used when possible and when allowed by primary surgical team.    Progress Note written now but Patient was seen earlier.

## 2021-04-06 NOTE — PROGRESS NOTE ADULT - ASSESSMENT
70y Female now POD#0 s/p laparoscopic converted to open left hemicolectomy and anterior resection    - Pain control: PCA  - NPO/NGT/IVF  - IV Abx  - Continue hold ASA   - Continue Gomez  - DVT ppx: Lovenox  - Incentive spirometry      Dispo: Floor    A Team Surgery  [id number] 70y Female now POD#0 s/p laparoscopic converted to open left hemicolectomy and anterior resection.    - Pain control: PCA  - NPO/NGT/IVF  - IV Abx  - Continue hold ASA   - Continue Gomez  - DVT ppx: Lovenox  -F/u PM CBC  - Incentive spirometry      Dispo: Floor    A Team Surgery  [id number] 70y Female now POD#1 s/p laparoscopic converted to open left hemicolectomy and anterior resection.    - 1L LR for low uop  - monitor CBC's  - Pain control: PCA  - NPO/NGT/IVF  - IV Abx  - Continue hold ASA   - Continue Gomez  - DVT ppx: Lovenox  - F/u PM CBC  - Incentive spirometry      Dispo: Floor    A Team Surgery  [id number]

## 2021-04-06 NOTE — PHYSICAL THERAPY INITIAL EVALUATION ADULT - GENERAL OBSERVATIONS, REHAB EVAL
Consult received, chart reviewed. Patient received in bed, no apparent distress, +NGT, +pulse ox, +beltran.

## 2021-04-06 NOTE — PATIENT PROFILE ADULT - VISION (WITH CORRECTIVE LENSES IF THE PATIENT USUALLY WEARS THEM):
2 pair of glasses/Normal vision: sees adequately in most situations; can see medication labels, newsprint

## 2021-04-06 NOTE — PATIENT PROFILE ADULT - NSPROHMDIABETMGMTSTRAT_GEN_A_NUR
activity/adequate rest/blood glucose testing/diet modification/insulin therapy/medication therapy/weight management

## 2021-04-06 NOTE — PHYSICAL THERAPY INITIAL EVALUATION ADULT - DISCHARGE DISPOSITION, PT EVAL
Anticipate Home with Home Physical Therapy to address current impairments and improve functional mobility.

## 2021-04-06 NOTE — PROVIDER CONTACT NOTE (OTHER) - BACKGROUND
Assessment/Plan     Osteoporosis  T-score and risk of fracture have worsened  Will begin prior auth process for Prolia, as patient is a poor bisphosphonate candidate due to posture and level of understanding re: very specific administration instructions  Benign essential hypertension  At goal, cont regimen  Tonic-clonic epileptic seizures ([address] )  Stable, following with neurology  Cont AEDs as ordered  Insomnia  Zolpidem refilled  Healthcare maintenance  PPD placed  RTC 3 months    Subjective     Chief Complaint: f/u URI, HTN, osteoporosis    HPI:   HPI    Doing much better post-URI  Using allergy meds  No other new complaints  Needs PPD  The following portions of the patient's history were reviewed and updated as appropriate: allergies, current medications, past family history, past medical history, past social history, past surgical history and problem list     Review of Systems  Review of Systems   Constitutional: Negative for activity change, chills, fatigue and fever  HENT: Negative for congestion, sinus pain, sinus pressure and sore throat  Respiratory: Negative for cough, shortness of breath and wheezing  Cardiovascular: Negative for chest pain, palpitations and leg swelling  Gastrointestinal: Negative for abdominal pain, diarrhea, nausea and vomiting  Genitourinary: Negative for dysuria, frequency and urgency  Musculoskeletal: Negative for arthralgias, myalgias, neck pain and neck stiffness  Skin: Negative for rash  Neurological: Negative for light-headedness and headaches  Objective   Vitals:    07/23/18 0904   BP: 106/70   Pulse: 84   Resp: 12   Temp: (!) 96 5 °F (35 8 °C)       Physical Exam   Constitutional: She appears well-developed and well-nourished  No distress  HENT:   Head: Normocephalic and atraumatic  Eyes: Pupils are equal, round, and reactive to light  Neck: Normal range of motion  Neck supple     Cardiovascular: Normal rate,
lap choley to open
regular rhythm and normal heart sounds  Exam reveals no gallop and no friction rub  No murmur heard  Pulmonary/Chest: Effort normal and breath sounds normal  No respiratory distress  She has no wheezes  She has no rales  Abdominal: Soft  She exhibits no distension  Musculoskeletal: Normal range of motion  Neurological: She is alert  Baseline nonverbal   Wheelchair bound  Psychiatric: She has a normal mood and affect  Lab Results: I have reviewed all the lab results 
lap choley to open

## 2021-04-07 LAB
ANION GAP SERPL CALC-SCNC: 8 MMOL/L — SIGNIFICANT CHANGE UP (ref 7–14)
BUN SERPL-MCNC: 16 MG/DL — SIGNIFICANT CHANGE UP (ref 7–23)
CALCIUM SERPL-MCNC: 8.5 MG/DL — SIGNIFICANT CHANGE UP (ref 8.4–10.5)
CHLORIDE SERPL-SCNC: 103 MMOL/L — SIGNIFICANT CHANGE UP (ref 98–107)
CO2 SERPL-SCNC: 26 MMOL/L — SIGNIFICANT CHANGE UP (ref 22–31)
CREAT SERPL-MCNC: 1.12 MG/DL — SIGNIFICANT CHANGE UP (ref 0.5–1.3)
GLUCOSE BLDC GLUCOMTR-MCNC: 105 MG/DL — HIGH (ref 70–99)
GLUCOSE BLDC GLUCOMTR-MCNC: 74 MG/DL — SIGNIFICANT CHANGE UP (ref 70–99)
GLUCOSE BLDC GLUCOMTR-MCNC: 87 MG/DL — SIGNIFICANT CHANGE UP (ref 70–99)
GLUCOSE BLDC GLUCOMTR-MCNC: 99 MG/DL — SIGNIFICANT CHANGE UP (ref 70–99)
GLUCOSE SERPL-MCNC: 77 MG/DL — SIGNIFICANT CHANGE UP (ref 70–99)
HCT VFR BLD CALC: 25.1 % — LOW (ref 34.5–45)
HGB BLD-MCNC: 8.1 G/DL — LOW (ref 11.5–15.5)
MAGNESIUM SERPL-MCNC: 2.1 MG/DL — SIGNIFICANT CHANGE UP (ref 1.6–2.6)
MCHC RBC-ENTMCNC: 31.3 PG — SIGNIFICANT CHANGE UP (ref 27–34)
MCHC RBC-ENTMCNC: 32.3 GM/DL — SIGNIFICANT CHANGE UP (ref 32–36)
MCV RBC AUTO: 96.9 FL — SIGNIFICANT CHANGE UP (ref 80–100)
NRBC # BLD: 0 /100 WBCS — SIGNIFICANT CHANGE UP
NRBC # FLD: 0 K/UL — SIGNIFICANT CHANGE UP
PHOSPHATE SERPL-MCNC: 1.9 MG/DL — LOW (ref 2.5–4.5)
PLATELET # BLD AUTO: 331 K/UL — SIGNIFICANT CHANGE UP (ref 150–400)
POTASSIUM SERPL-MCNC: 3.8 MMOL/L — SIGNIFICANT CHANGE UP (ref 3.5–5.3)
POTASSIUM SERPL-SCNC: 3.8 MMOL/L — SIGNIFICANT CHANGE UP (ref 3.5–5.3)
RBC # BLD: 2.59 M/UL — LOW (ref 3.8–5.2)
RBC # FLD: 13.4 % — SIGNIFICANT CHANGE UP (ref 10.3–14.5)
SODIUM SERPL-SCNC: 137 MMOL/L — SIGNIFICANT CHANGE UP (ref 135–145)
WBC # BLD: 13.21 K/UL — HIGH (ref 3.8–10.5)
WBC # FLD AUTO: 13.21 K/UL — HIGH (ref 3.8–10.5)

## 2021-04-07 PROCEDURE — 71045 X-RAY EXAM CHEST 1 VIEW: CPT | Mod: 26

## 2021-04-07 RX ORDER — POTASSIUM PHOSPHATE, MONOBASIC POTASSIUM PHOSPHATE, DIBASIC 236; 224 MG/ML; MG/ML
30 INJECTION, SOLUTION INTRAVENOUS ONCE
Refills: 0 | Status: COMPLETED | OUTPATIENT
Start: 2021-04-07 | End: 2021-04-07

## 2021-04-07 RX ORDER — TETRACAINE/BENZOCAINE/BUTAMBEN 2%-14%-2%
1 OINTMENT (GRAM) TOPICAL ONCE
Refills: 0 | Status: COMPLETED | OUTPATIENT
Start: 2021-04-07 | End: 2021-04-07

## 2021-04-07 RX ORDER — SODIUM CHLORIDE 9 MG/ML
1000 INJECTION, SOLUTION INTRAVENOUS
Refills: 0 | Status: DISCONTINUED | OUTPATIENT
Start: 2021-04-07 | End: 2021-04-09

## 2021-04-07 RX ORDER — POTASSIUM CHLORIDE 20 MEQ
10 PACKET (EA) ORAL
Refills: 0 | Status: COMPLETED | OUTPATIENT
Start: 2021-04-07 | End: 2021-04-07

## 2021-04-07 RX ORDER — ACETAMINOPHEN 500 MG
1000 TABLET ORAL EVERY 6 HOURS
Refills: 0 | Status: DISCONTINUED | OUTPATIENT
Start: 2021-04-07 | End: 2021-04-07

## 2021-04-07 RX ORDER — ACETAMINOPHEN 500 MG
1000 TABLET ORAL EVERY 6 HOURS
Refills: 0 | Status: COMPLETED | OUTPATIENT
Start: 2021-04-07 | End: 2021-04-10

## 2021-04-07 RX ADMIN — Medication 100 MILLIEQUIVALENT(S): at 16:00

## 2021-04-07 RX ADMIN — Medication 5 MILLIGRAM(S): at 00:30

## 2021-04-07 RX ADMIN — POTASSIUM PHOSPHATE, MONOBASIC POTASSIUM PHOSPHATE, DIBASIC 83.33 MILLIMOLE(S): 236; 224 INJECTION, SOLUTION INTRAVENOUS at 18:20

## 2021-04-07 RX ADMIN — HYDROMORPHONE HYDROCHLORIDE 30 MILLILITER(S): 2 INJECTION INTRAMUSCULAR; INTRAVENOUS; SUBCUTANEOUS at 08:08

## 2021-04-07 RX ADMIN — HYDROMORPHONE HYDROCHLORIDE 30 MILLILITER(S): 2 INJECTION INTRAMUSCULAR; INTRAVENOUS; SUBCUTANEOUS at 00:34

## 2021-04-07 RX ADMIN — Medication 100 GRAM(S): at 00:34

## 2021-04-07 RX ADMIN — Medication 1000 MILLIGRAM(S): at 18:49

## 2021-04-07 RX ADMIN — Medication 1000 MILLIGRAM(S): at 12:20

## 2021-04-07 RX ADMIN — Medication 1 SPRAY(S): at 02:34

## 2021-04-07 RX ADMIN — Medication 400 MILLIGRAM(S): at 18:19

## 2021-04-07 RX ADMIN — Medication 5 MILLIGRAM(S): at 07:09

## 2021-04-07 RX ADMIN — Medication 5 MILLIGRAM(S): at 23:47

## 2021-04-07 RX ADMIN — SODIUM CHLORIDE 3 MILLILITER(S): 9 INJECTION INTRAMUSCULAR; INTRAVENOUS; SUBCUTANEOUS at 06:24

## 2021-04-07 RX ADMIN — HYDROMORPHONE HYDROCHLORIDE 30 MILLILITER(S): 2 INJECTION INTRAMUSCULAR; INTRAVENOUS; SUBCUTANEOUS at 20:29

## 2021-04-07 RX ADMIN — SODIUM CHLORIDE 3 MILLILITER(S): 9 INJECTION INTRAMUSCULAR; INTRAVENOUS; SUBCUTANEOUS at 21:26

## 2021-04-07 RX ADMIN — Medication 100 MILLIEQUIVALENT(S): at 12:05

## 2021-04-07 RX ADMIN — ENOXAPARIN SODIUM 40 MILLIGRAM(S): 100 INJECTION SUBCUTANEOUS at 13:48

## 2021-04-07 RX ADMIN — SODIUM CHLORIDE 3 MILLILITER(S): 9 INJECTION INTRAMUSCULAR; INTRAVENOUS; SUBCUTANEOUS at 14:02

## 2021-04-07 RX ADMIN — Medication 5 MILLIGRAM(S): at 18:19

## 2021-04-07 RX ADMIN — Medication 400 MILLIGRAM(S): at 23:47

## 2021-04-07 RX ADMIN — Medication 100 GRAM(S): at 09:59

## 2021-04-07 RX ADMIN — Medication 100 MILLIEQUIVALENT(S): at 13:49

## 2021-04-07 RX ADMIN — Medication 400 MILLIGRAM(S): at 12:05

## 2021-04-07 NOTE — PROGRESS NOTE ADULT - ASSESSMENT
Patient seen and examined    70 year old Jehovah witness, PMHx HTN, HLD, Type 2 DM, CAD, and recent dx of malignant neoplasm of left colon, s/p laparoscopic converted to open left hemicolectomy, POD#2, recovering appropriately on the floors.    PLAN:  - NPO/NGT/IVF  - c/w beltran cath  - OOBA, Incentive spirometry  - Pain control: PCA  - VTE prophylaxis with Heparin subcutaneous    A Team Surgery [id number]

## 2021-04-07 NOTE — PROGRESS NOTE ADULT - SUBJECTIVE AND OBJECTIVE BOX
DATE OF SERVICE: 04-07-21 @ 08:41    INTERVAL HPI/OVERNIGHT EVENTS: Patient seen and examined, resting at bedside awake and alert. Low grade fever 38c @ 7am today. H/H downtrending from yesterday, urine out adequate overnight s/p 1L bolus.  Patient NPO with NGT in place, denies any nausea or vomiting. Denies passing flatus or having bowel movements. Patient voiding via beltran cath.  Denies chills, SOB, or chest pain, headaches or dizziness. Pain well controlled.    STATUS POST:  left hemicolectomy    POST OPERATIVE DAY #: 2    MEDICATIONS  (STANDING):  cefoTEtan  IVPB 2 Gram(s) IV Intermittent every 12 hours  chlorhexidine 2% Cloths 1 Application(s) Topical <User Schedule>  dextrose 40% Gel 15 Gram(s) Oral once  dextrose 5%. 1000 milliLiter(s) (50 mL/Hr) IV Continuous <Continuous>  dextrose 5%. 1000 milliLiter(s) (100 mL/Hr) IV Continuous <Continuous>  dextrose 50% Injectable 25 Gram(s) IV Push once  dextrose 50% Injectable 12.5 Gram(s) IV Push once  dextrose 50% Injectable 25 Gram(s) IV Push once  enoxaparin Injectable 40 milliGRAM(s) SubCutaneous daily  glucagon  Injectable 1 milliGRAM(s) IntraMuscular once  HYDROmorphone PCA (1 mG/mL) 30 milliLiter(s) PCA Continuous PCA Continuous  insulin lispro (ADMELOG) corrective regimen sliding scale   SubCutaneous every 6 hours  lactated ringers. 1000 milliLiter(s) (100 mL/Hr) IV Continuous <Continuous>  metoprolol tartrate Injectable 5 milliGRAM(s) IV Push every 6 hours  sodium chloride 0.9% lock flush 3 milliLiter(s) IV Push every 8 hours    MEDICATIONS  (PRN):  HYDROmorphone PCA (1 mG/mL) Rescue Clinician Bolus 0.5 milliGRAM(s) IV Push every 15 minutes PRN for Pain Scale GREATER THAN 6  naloxone Injectable 0.1 milliGRAM(s) IV Push every 3 minutes PRN For ANY of the following changes in patient status:  A. RR LESS THAN 10 breaths per minute, B. Oxygen saturation LESS THAN 90%, C. Sedation score of 6  ondansetron Injectable 4 milliGRAM(s) IV Push every 6 hours PRN Nausea      Vital Signs Last 24 Hrs  T(C): 38 (07 Apr 2021 07:05), Max: 38 (07 Apr 2021 07:05)  T(F): 100.4 (07 Apr 2021 07:05), Max: 100.4 (07 Apr 2021 07:05)  HR: 93 (07 Apr 2021 07:05) (71 - 93)  BP: 138/79 (07 Apr 2021 07:05) (124/56 - 140/67)  BP(mean): --  RR: 18 (07 Apr 2021 07:05) (16 - 18)  SpO2: 100% (07 Apr 2021 07:05) (95% - 100%)  I&O's Detail    06 Apr 2021 07:01  -  07 Apr 2021 07:00  --------------------------------------------------------  IN:    IV PiggyBack: 100 mL    Lactated Ringers: 1300 mL  Total IN: 1400 mL    OUT:    Bulb (mL): 82.5 mL    Indwelling Catheter - Urethral (mL): 1620 mL    Nasogastric/Oral tube (mL): 400 mL  Total OUT: 2102.5 mL    Total NET: -702.5 mL          REVIEW OF SYSTEMS:  CONSTITUTIONAL: No weakness, fevers or chills  EYES/ENT: No visual changes;  No vertigo or throat pain   NECK: No pain or stiffness  RESPIRATORY: No cough, wheezing, hemoptysis; No shortness of breath  CARDIOVASCULAR: No chest pain or palpitations  GASTROINTESTINAL: No abdominal or epigastric pain. No nausea, vomiting, or hematemesis; No diarrhea or constipation. No melena or hematochezia.  GENITOURINARY: No dysuria, frequency or hematuria  NEUROLOGICAL: No numbness or weakness  SKIN: No itching, burning, rashes, or lesions   All other review of systems is negative unless indicated above.    Physical Exam:  General: WN/WD NAD  Respiratory: airway patent, respirations unlabored, no increased WoB  Neurology: A&Ox3, nonfocal, PERSON x 4  Abdominal: Soft, obese, LLQ appropriately tender to palpation, ND, no rebounding or guarding  Midline incision dressed with Aquacel: CDI, minimal strikethrough  Port incisions dressed with OPsites: CDI no strikethrought  GALINA Drain: minimal SSF output more sanguineous and serous  Beltran in place with straw-colored urine  NGT to wall suction, patent and functioning with dark green bilious output      LABS:                        8.1    13.21 )-----------( 331      ( 07 Apr 2021 06:58 )             25.1     04-07    137  |  103  |  16  ----------------------------<  77  3.8   |  26  |  1.12    Ca    8.5      07 Apr 2021 06:58  Phos  1.9     04-07  Mg     2.1     04-07

## 2021-04-07 NOTE — PROGRESS NOTE ADULT - SUBJECTIVE AND OBJECTIVE BOX
SURGERY PROGRESS NOTE    SUBJECTIVE / 24H EVENTS:  Patient seen and examined on morning rounds. No acute events overnight. Oliguric with mild drop in H/H (now stable) yesterday afternoon, s/p 1L bolus. UOP adequate overnight.       OBJECTIVE:  VITAL SIGNS:  T(C): 37.8 (04-07-21 @ 02:16), Max: 37.8 (04-07-21 @ 02:16)  HR: 80 (04-07-21 @ 02:16) (71 - 84)  BP: 129/44 (04-07-21 @ 02:16) (124/56 - 140/67)  RR: 18 (04-07-21 @ 02:16) (16 - 18)  SpO2: 100% (04-07-21 @ 02:16) (95% - 100%)  Daily     Daily   POCT Blood Glucose.: 97 mg/dL (04-06-21 @ 23:35)  POCT Blood Glucose.: 117 mg/dL (04-06-21 @ 18:33)  POCT Blood Glucose.: 143 mg/dL (04-06-21 @ 11:55)      PHYSICAL EXAM:  Gen: NAD, resting in bed, alert and responding appropriately  Resp: Non-labored respirations  Abd: Soft, Obese, appropriately tender to palpation. Midline incision with Aquacel dressing c/d/i, post sites covered with opsite dressings c/d/i. GALINA Drain in RLQ with s/s output  Ext: Grossly moving all extremities normally      04-05-21 @ 07:01  -  04-06-21 @ 07:00  --------------------------------------------------------  IN:    IV PiggyBack: 200 mL    Lactated Ringers: 1875 mL  Total IN: 2075 mL    OUT:    Bulb (mL): 87.5 mL    Indwelling Catheter - Urethral (mL): 550 mL    Oral Fluid: 0 mL  Total OUT: 637.5 mL    Total NET: 1437.5 mL      04-06-21 @ 07:01  -  04-07-21 @ 04:14  --------------------------------------------------------  IN:    IV PiggyBack: 100 mL    Lactated Ringers: 1300 mL  Total IN: 1400 mL    OUT:    Bulb (mL): 82.5 mL    Indwelling Catheter - Urethral (mL): 1620 mL    Nasogastric/Oral tube (mL): 400 mL  Total OUT: 2102.5 mL    Total NET: -702.5 mL          LAB VALUES:  04-06    137  |  101  |  24<H>  ----------------------------<  163<H>  4.3   |  24  |  1.49<H>    Ca    8.3<L>      06 Apr 2021 07:54  Phos  4.2     04-06  Mg     1.8     04-06                                 9.1    13.39 )-----------( 355      ( 06 Apr 2021 12:16 )             27.8                   MICROBIOLOGY:      RADIOLOGY:        MEDICATIONS  (STANDING):  cefoTEtan  IVPB 2 Gram(s) IV Intermittent every 12 hours  chlorhexidine 2% Cloths 1 Application(s) Topical <User Schedule>  dextrose 40% Gel 15 Gram(s) Oral once  dextrose 5%. 1000 milliLiter(s) (100 mL/Hr) IV Continuous <Continuous>  dextrose 5%. 1000 milliLiter(s) (50 mL/Hr) IV Continuous <Continuous>  dextrose 50% Injectable 25 Gram(s) IV Push once  dextrose 50% Injectable 12.5 Gram(s) IV Push once  dextrose 50% Injectable 25 Gram(s) IV Push once  enoxaparin Injectable 40 milliGRAM(s) SubCutaneous daily  glucagon  Injectable 1 milliGRAM(s) IntraMuscular once  HYDROmorphone PCA (1 mG/mL) 30 milliLiter(s) PCA Continuous PCA Continuous  insulin lispro (ADMELOG) corrective regimen sliding scale   SubCutaneous every 6 hours  lactated ringers. 1000 milliLiter(s) (100 mL/Hr) IV Continuous <Continuous>  metoprolol tartrate Injectable 5 milliGRAM(s) IV Push every 6 hours  sodium chloride 0.9% lock flush 3 milliLiter(s) IV Push every 8 hours    MEDICATIONS  (PRN):  HYDROmorphone PCA (1 mG/mL) Rescue Clinician Bolus 0.5 milliGRAM(s) IV Push every 15 minutes PRN for Pain Scale GREATER THAN 6  naloxone Injectable 0.1 milliGRAM(s) IV Push every 3 minutes PRN For ANY of the following changes in patient status:  A. RR LESS THAN 10 breaths per minute, B. Oxygen saturation LESS THAN 90%, C. Sedation score of 6  ondansetron Injectable 4 milliGRAM(s) IV Push every 6 hours PRN Nausea

## 2021-04-07 NOTE — PROGRESS NOTE ADULT - ASSESSMENT
70 year old Jehovah witness, PMHx HTN, HLD, Type 2 DM, CAD, and recent dx of malignant neoplasm of left colon, s/p laparoscopic converted to open left hemicolectomy, POD#2, recovering appropriately on the floors.    PLAN:  - NPO/NGT/IVF  - f/u AM labs  - Continue beltran  - Incentive spirometry  - Pain control: PCA  - VTE prophylaxis with Heparin subcutaneous    A Team Surgery   [id number]    70 year old Jehovah witness, PMHx HTN, HLD, Type 2 DM, CAD, and recent dx of malignant neoplasm of left colon, s/p laparoscopic converted to open left hemicolectomy, POD#2, recovering appropriately on the floors.    PLAN:  - NPO/NGT/IVF  - f/u AM labs  - Continue beltran  - Incentive spirometry  - Pain control: PCA  -add IV tylenol for pain  -encourage ambulation, OOB to chair  - VTE prophylaxis with Heparin subcutaneous    A Team Surgery   [id number]

## 2021-04-07 NOTE — PROGRESS NOTE ADULT - SUBJECTIVE AND OBJECTIVE BOX
Follow up consult for Acute Pain Management     SUBJECTIVE:  The patient states pain improves w use of IV PCA, well controlled, preparing to ambulate today.   		  OBJECTIVE:  Patient is lying in bed, comfortably.     Pain Score:   (X) Refer to pain scores    Therapy:	[X ] IV PCA	[ ] Epidural   [ ] s/p Spinal Opioid	[ ] Peripheral nerve block  (x) PRN Oral/IV opioids and or Adjuvant non-opioid medications  	  Vital Signs Last 24 Hrs  T(C): 37 (07 Apr 2021 09:49), Max: 38 (07 Apr 2021 07:05)  T(F): 98.6 (07 Apr 2021 09:49), Max: 100.4 (07 Apr 2021 07:05)  HR: 84 (07 Apr 2021 09:49) (71 - 93)  BP: 129/48 (07 Apr 2021 09:49) (124/58 - 140/67)  BP(mean): --  RR: 18 (07 Apr 2021 09:49) (16 - 18)  SpO2: 100% (07 Apr 2021 09:49) (99% - 100%)    ( x) Alert & Oriented     ( ) No motor/sensory block     ( ) Nausea     ( ) Pruritis     ( ) Headache    ASSESSMENT/ PLAN    Therapy to  be:	[x ] Continue   [ ] Discontinued      Documentation and Verification of current medications:   [X] Done	[ ] Not done, not elligible    Comments:   Patient's pain is better controlled.  Continue current pain regimen. PRN Oral/IV opioids and/or Adjuvant non-opioid medication to be ordered at this point.      Progress Note written now but Patient was seen earlier.

## 2021-04-08 LAB
ANION GAP SERPL CALC-SCNC: 9 MMOL/L — SIGNIFICANT CHANGE UP (ref 7–14)
BUN SERPL-MCNC: 9 MG/DL — SIGNIFICANT CHANGE UP (ref 7–23)
CALCIUM SERPL-MCNC: 8.2 MG/DL — LOW (ref 8.4–10.5)
CHLORIDE SERPL-SCNC: 101 MMOL/L — SIGNIFICANT CHANGE UP (ref 98–107)
CO2 SERPL-SCNC: 27 MMOL/L — SIGNIFICANT CHANGE UP (ref 22–31)
CREAT SERPL-MCNC: 0.9 MG/DL — SIGNIFICANT CHANGE UP (ref 0.5–1.3)
GLUCOSE BLDC GLUCOMTR-MCNC: 126 MG/DL — HIGH (ref 70–99)
GLUCOSE BLDC GLUCOMTR-MCNC: 135 MG/DL — HIGH (ref 70–99)
GLUCOSE BLDC GLUCOMTR-MCNC: 137 MG/DL — HIGH (ref 70–99)
GLUCOSE BLDC GLUCOMTR-MCNC: 161 MG/DL — HIGH (ref 70–99)
GLUCOSE SERPL-MCNC: 114 MG/DL — HIGH (ref 70–99)
HCT VFR BLD CALC: 23.5 % — LOW (ref 34.5–45)
HGB BLD-MCNC: 7.6 G/DL — LOW (ref 11.5–15.5)
MAGNESIUM SERPL-MCNC: 1.9 MG/DL — SIGNIFICANT CHANGE UP (ref 1.6–2.6)
MCHC RBC-ENTMCNC: 31.3 PG — SIGNIFICANT CHANGE UP (ref 27–34)
MCHC RBC-ENTMCNC: 32.3 GM/DL — SIGNIFICANT CHANGE UP (ref 32–36)
MCV RBC AUTO: 96.7 FL — SIGNIFICANT CHANGE UP (ref 80–100)
NRBC # BLD: 0 /100 WBCS — SIGNIFICANT CHANGE UP
NRBC # FLD: 0 K/UL — SIGNIFICANT CHANGE UP
PHOSPHATE SERPL-MCNC: 2.1 MG/DL — LOW (ref 2.5–4.5)
PLATELET # BLD AUTO: 309 K/UL — SIGNIFICANT CHANGE UP (ref 150–400)
POTASSIUM SERPL-MCNC: 3.7 MMOL/L — SIGNIFICANT CHANGE UP (ref 3.5–5.3)
POTASSIUM SERPL-SCNC: 3.7 MMOL/L — SIGNIFICANT CHANGE UP (ref 3.5–5.3)
RBC # BLD: 2.43 M/UL — LOW (ref 3.8–5.2)
RBC # FLD: 13.2 % — SIGNIFICANT CHANGE UP (ref 10.3–14.5)
SODIUM SERPL-SCNC: 137 MMOL/L — SIGNIFICANT CHANGE UP (ref 135–145)
WBC # BLD: 13.82 K/UL — HIGH (ref 3.8–10.5)
WBC # FLD AUTO: 13.82 K/UL — HIGH (ref 3.8–10.5)

## 2021-04-08 RX ORDER — BENZOCAINE AND MENTHOL 5; 1 G/100ML; G/100ML
1 LIQUID ORAL EVERY 4 HOURS
Refills: 0 | Status: DISCONTINUED | OUTPATIENT
Start: 2021-04-08 | End: 2021-04-13

## 2021-04-08 RX ORDER — POTASSIUM PHOSPHATE, MONOBASIC POTASSIUM PHOSPHATE, DIBASIC 236; 224 MG/ML; MG/ML
15 INJECTION, SOLUTION INTRAVENOUS ONCE
Refills: 0 | Status: COMPLETED | OUTPATIENT
Start: 2021-04-08 | End: 2021-04-08

## 2021-04-08 RX ORDER — POTASSIUM CHLORIDE 20 MEQ
10 PACKET (EA) ORAL
Refills: 0 | Status: COMPLETED | OUTPATIENT
Start: 2021-04-08 | End: 2021-04-08

## 2021-04-08 RX ADMIN — Medication 100 MILLIEQUIVALENT(S): at 12:33

## 2021-04-08 RX ADMIN — HYDROMORPHONE HYDROCHLORIDE 30 MILLILITER(S): 2 INJECTION INTRAMUSCULAR; INTRAVENOUS; SUBCUTANEOUS at 07:55

## 2021-04-08 RX ADMIN — Medication 400 MILLIGRAM(S): at 06:44

## 2021-04-08 RX ADMIN — BENZOCAINE AND MENTHOL 1 LOZENGE: 5; 1 LIQUID ORAL at 12:31

## 2021-04-08 RX ADMIN — Medication 2: at 18:41

## 2021-04-08 RX ADMIN — HYDROMORPHONE HYDROCHLORIDE 30 MILLILITER(S): 2 INJECTION INTRAMUSCULAR; INTRAVENOUS; SUBCUTANEOUS at 20:15

## 2021-04-08 RX ADMIN — Medication 5 MILLIGRAM(S): at 06:44

## 2021-04-08 RX ADMIN — Medication 1000 MILLIGRAM(S): at 07:14

## 2021-04-08 RX ADMIN — SODIUM CHLORIDE 3 MILLILITER(S): 9 INJECTION INTRAMUSCULAR; INTRAVENOUS; SUBCUTANEOUS at 14:00

## 2021-04-08 RX ADMIN — Medication 100 MILLIEQUIVALENT(S): at 15:00

## 2021-04-08 RX ADMIN — Medication 5 MILLIGRAM(S): at 12:33

## 2021-04-08 RX ADMIN — HYDROMORPHONE HYDROCHLORIDE 30 MILLILITER(S): 2 INJECTION INTRAMUSCULAR; INTRAVENOUS; SUBCUTANEOUS at 12:06

## 2021-04-08 RX ADMIN — Medication 1000 MILLIGRAM(S): at 00:17

## 2021-04-08 RX ADMIN — Medication 100 MILLIEQUIVALENT(S): at 18:00

## 2021-04-08 RX ADMIN — Medication 400 MILLIGRAM(S): at 12:32

## 2021-04-08 RX ADMIN — Medication 5 MILLIGRAM(S): at 23:41

## 2021-04-08 RX ADMIN — ENOXAPARIN SODIUM 40 MILLIGRAM(S): 100 INJECTION SUBCUTANEOUS at 12:33

## 2021-04-08 RX ADMIN — Medication 5 MILLIGRAM(S): at 18:41

## 2021-04-08 RX ADMIN — SODIUM CHLORIDE 3 MILLILITER(S): 9 INJECTION INTRAMUSCULAR; INTRAVENOUS; SUBCUTANEOUS at 06:45

## 2021-04-08 RX ADMIN — SODIUM CHLORIDE 3 MILLILITER(S): 9 INJECTION INTRAMUSCULAR; INTRAVENOUS; SUBCUTANEOUS at 21:21

## 2021-04-08 RX ADMIN — CHLORHEXIDINE GLUCONATE 1 APPLICATION(S): 213 SOLUTION TOPICAL at 06:53

## 2021-04-08 RX ADMIN — POTASSIUM PHOSPHATE, MONOBASIC POTASSIUM PHOSPHATE, DIBASIC 62.5 MILLIMOLE(S): 236; 224 INJECTION, SOLUTION INTRAVENOUS at 19:31

## 2021-04-08 RX ADMIN — Medication 400 MILLIGRAM(S): at 18:44

## 2021-04-08 RX ADMIN — Medication 400 MILLIGRAM(S): at 23:41

## 2021-04-08 NOTE — PROGRESS NOTE ADULT - SUBJECTIVE AND OBJECTIVE BOX
DATE OF SERVICE: 04-08-21 @ 14:13    INTERVAL HPI/OVERNIGHT EVENTS: Patient seen and examined, resting comfortably at bedside awake and alert. H/H remains downtrending (hct down from 25.1 to 23.5). Patient remains NPO denies any nausea, vomiting or feeling bloated. Denies passing flatus or BM's. Patient endorses voiding without issues and has been out of bed and ambulating. Denies fever, chills, SOB, chest pain, dizziness, syncope, headaches or changes in vision. Pain well controlled.    STATUS POST:  left hemicolectomy    POST OPERATIVE DAY #: 3    MEDICATIONS  (STANDING):  acetaminophen  IVPB .. 1000 milliGRAM(s) IV Intermittent every 6 hours  chlorhexidine 2% Cloths 1 Application(s) Topical <User Schedule>  dextrose 5% + sodium chloride 0.45%. 1000 milliLiter(s) (75 mL/Hr) IV Continuous <Continuous>  dextrose 5%. 1000 milliLiter(s) (50 mL/Hr) IV Continuous <Continuous>  dextrose 5%. 1000 milliLiter(s) (100 mL/Hr) IV Continuous <Continuous>  dextrose 50% Injectable 25 Gram(s) IV Push once  dextrose 50% Injectable 12.5 Gram(s) IV Push once  dextrose 50% Injectable 25 Gram(s) IV Push once  enoxaparin Injectable 40 milliGRAM(s) SubCutaneous daily  glucagon  Injectable 1 milliGRAM(s) IntraMuscular once  HYDROmorphone PCA (1 mG/mL) 30 milliLiter(s) PCA Continuous PCA Continuous  insulin lispro (ADMELOG) corrective regimen sliding scale   SubCutaneous every 6 hours  metoprolol tartrate Injectable 5 milliGRAM(s) IV Push every 6 hours  potassium chloride  10 mEq/100 mL IVPB 10 milliEquivalent(s) IV Intermittent every 1 hour  potassium phosphate IVPB 15 milliMole(s) IV Intermittent once  sodium chloride 0.9% lock flush 3 milliLiter(s) IV Push every 8 hours    MEDICATIONS  (PRN):  benzocaine 15 mG/menthol 3.6 mG (Sugar-Free) Lozenge 1 Lozenge Oral every 4 hours PRN Sore Throat  HYDROmorphone PCA (1 mG/mL) Rescue Clinician Bolus 0.5 milliGRAM(s) IV Push every 15 minutes PRN for Pain Scale GREATER THAN 6  naloxone Injectable 0.1 milliGRAM(s) IV Push every 3 minutes PRN For ANY of the following changes in patient status:  A. RR LESS THAN 10 breaths per minute, B. Oxygen saturation LESS THAN 90%, C. Sedation score of 6  ondansetron Injectable 4 milliGRAM(s) IV Push every 6 hours PRN Nausea      Vital Signs Last 24 Hrs  T(C): 36.7 (08 Apr 2021 12:30), Max: 37.6 (07 Apr 2021 22:39)  T(F): 98.1 (08 Apr 2021 12:30), Max: 99.6 (07 Apr 2021 22:39)  HR: 81 (08 Apr 2021 12:30) (71 - 88)  BP: 157/68 (08 Apr 2021 12:30) (127/52 - 160/80)  BP(mean): --  RR: 18 (08 Apr 2021 12:30) (17 - 18)  SpO2: 97% (08 Apr 2021 12:30) (93% - 97%)  I&O's Detail    07 Apr 2021 07:01  -  08 Apr 2021 07:00  --------------------------------------------------------  IN:    dextrose 5% + sodium chloride 0.45%: 1000 mL    IV PiggyBack: 100 mL  Total IN: 1100 mL    OUT:    Bulb (mL): 12.5 mL    Indwelling Catheter - Urethral (mL): 1600 mL    Nasogastric/Oral tube (mL): 400 mL    Oral Fluid: 0 mL  Total OUT: 2012.5 mL    Total NET: -912.5 mL      08 Apr 2021 07:01  -  08 Apr 2021 14:13  --------------------------------------------------------  IN:  Total IN: 0 mL    OUT:    Voided (mL): 400 mL  Total OUT: 400 mL    Total NET: -400 mL          REVIEW OF SYSTEMS:  CONSTITUTIONAL: No weakness, fevers or chills  EYES/ENT: No visual changes;  No vertigo or throat pain   NECK: No pain or stiffness  RESPIRATORY: No cough, wheezing, hemoptysis; No shortness of breath  CARDIOVASCULAR: No chest pain or palpitations  GASTROINTESTINAL: No abdominal or epigastric pain. No nausea, vomiting, or hematemesis; No diarrhea or constipation. No melena or hematochezia.  GENITOURINARY: No dysuria, frequency or hematuria  NEUROLOGICAL: No numbness or weakness  SKIN: No itching, burning, rashes, or lesions   All other review of systems is negative unless indicated above.    Physical Exam:  General: WN/WD NAD  Respiratory: airway patent, respirations unlabored, no increased WoB  Neurology: A&Ox3, nonfocal, PERSON x 4  Abdominal: Soft, obese, appropriately tender to palpation at midline incision, ND, no rebounding or guarding  [place] incision dressed with Aquacel: C/D/I, minimal strikethrough  Port incisions dressed with OPSites: C/D/I, no strikethrough appreciated  GALINA Drain: patent with minimal SSF output more sanguineous than serous  NGT to wall suction, patent and functioning with dark green bilious output      LABS:                        7.6    13.82 )-----------( 309      ( 08 Apr 2021 07:55 )             23.5     04-08    137  |  101  |  9   ----------------------------<  114<H>  3.7   |  27  |  0.90    Ca    8.2<L>      08 Apr 2021 07:55  Phos  2.1     04-08  Mg     1.9     04-08

## 2021-04-08 NOTE — PROGRESS NOTE ADULT - ASSESSMENT
Patient seen and examined    70 year old Jehovah witness, PMHx HTN, HLD, Type 2 DM, CAD, and recent dx of malignant neoplasm of left colon, s/p laparoscopic converted to open left hemicolectomy, POD#3, H/H downtrending since OR.    PLAN:    - NPO/NGT/IVF  - d/c beltran cath, await TOV   - OOBA, Incentive spirometry  - Pain control: PCA PRN, IV Tylenol  - VTE prophylaxis with Heparin subcutaneous  - cont to monitor H/H trends    A Team Surgery [id number]       PLAN:  - Diet: CLD  - Activity: encourage Out of bed, IS  - Pain control  - VTE prophylaxis with Heparin subcutaneous    A Team Surgery [id number]  Patient seen and examined    70 year old Jehovah witness, PMHx HTN, HLD, Type 2 DM, CAD, and recent dx of malignant neoplasm of left colon, s/p laparoscopic converted to open left hemicolectomy, POD#3, asymptomatic drop in H/H since OR.    PLAN:    - NPO/NGT/IVF  - d/c beltran cath, await TOV   - OOBA, Incentive spirometry  - Pain control: PCA PRN, IV Tylenol  - VTE prophylaxis with Heparin subcutaneous  - cont to monitor H/H trends    A Team Surgery [id number]

## 2021-04-08 NOTE — PROGRESS NOTE ADULT - SUBJECTIVE AND OBJECTIVE BOX
SURGERY PROGRESS NOTE    SUBJECTIVE / 24H EVENTS:  Patient seen and examined on morning rounds. No acute events overnight. Small drop in H/H notes on yesterday's labs.      OBJECTIVE:  VITAL SIGNS:  T(C): 36.8 (04-08-21 @ 01:33), Max: 38 (04-07-21 @ 07:05)  HR: 76 (04-08-21 @ 01:33) (71 - 93)  BP: 127/52 (04-08-21 @ 01:33) (117/44 - 154/56)  RR: 18 (04-08-21 @ 01:33) (18 - 18)  SpO2: 94% (04-08-21 @ 01:33) (94% - 100%)  Daily     Daily   POCT Blood Glucose.: 105 mg/dL (04-07-21 @ 22:06)  POCT Blood Glucose.: 99 mg/dL (04-07-21 @ 18:01)  POCT Blood Glucose.: 74 mg/dL (04-07-21 @ 14:07)      PHYSICAL EXAM:  Gen: NAD, resting in bed, alert and responding appropriately  Resp: Non-labored respirations  Abd: Soft, Obese, appropriately tender to palpation. Midline incision with Aquacel dressing c/d/i, post sites covered with opsite dressings c/d/i. GALINA Drain in RLQ with s/s output  Ext: Grossly moving all extremities normally      04-06-21 @ 07:01  -  04-07-21 @ 07:00  --------------------------------------------------------  IN:    IV PiggyBack: 100 mL    Lactated Ringers: 1300 mL  Total IN: 1400 mL    OUT:    Bulb (mL): 82.5 mL    Indwelling Catheter - Urethral (mL): 1620 mL    Nasogastric/Oral tube (mL): 400 mL  Total OUT: 2102.5 mL    Total NET: -702.5 mL      04-07-21 @ 07:01  -  04-08-21 @ 03:00  --------------------------------------------------------  IN:    dextrose 5% + sodium chloride 0.45%: 600 mL  Total IN: 600 mL    OUT:    Bulb (mL): 12.5 mL    Indwelling Catheter - Urethral (mL): 1300 mL    Nasogastric/Oral tube (mL): 250 mL    Oral Fluid: 0 mL  Total OUT: 1562.5 mL    Total NET: -962.5 mL          LAB VALUES:  04-07    137  |  103  |  16  ----------------------------<  77  3.8   |  26  |  1.12    Ca    8.5      07 Apr 2021 06:58  Phos  1.9     04-07  Mg     2.1     04-07                                 8.1    13.21 )-----------( 331      ( 07 Apr 2021 06:58 )             25.1                   MICROBIOLOGY:      RADIOLOGY:  PACS Image: Image(s) Available (04-07-21 @ 20:03)        MEDICATIONS  (STANDING):  acetaminophen  IVPB .. 1000 milliGRAM(s) IV Intermittent every 6 hours  chlorhexidine 2% Cloths 1 Application(s) Topical <User Schedule>  dextrose 40% Gel 15 Gram(s) Oral once  dextrose 5% + sodium chloride 0.45%. 1000 milliLiter(s) (100 mL/Hr) IV Continuous <Continuous>  dextrose 5%. 1000 milliLiter(s) (50 mL/Hr) IV Continuous <Continuous>  dextrose 5%. 1000 milliLiter(s) (100 mL/Hr) IV Continuous <Continuous>  dextrose 50% Injectable 25 Gram(s) IV Push once  dextrose 50% Injectable 12.5 Gram(s) IV Push once  dextrose 50% Injectable 25 Gram(s) IV Push once  enoxaparin Injectable 40 milliGRAM(s) SubCutaneous daily  glucagon  Injectable 1 milliGRAM(s) IntraMuscular once  HYDROmorphone PCA (1 mG/mL) 30 milliLiter(s) PCA Continuous PCA Continuous  insulin lispro (ADMELOG) corrective regimen sliding scale   SubCutaneous every 6 hours  metoprolol tartrate Injectable 5 milliGRAM(s) IV Push every 6 hours  sodium chloride 0.9% lock flush 3 milliLiter(s) IV Push every 8 hours    MEDICATIONS  (PRN):  HYDROmorphone PCA (1 mG/mL) Rescue Clinician Bolus 0.5 milliGRAM(s) IV Push every 15 minutes PRN for Pain Scale GREATER THAN 6  naloxone Injectable 0.1 milliGRAM(s) IV Push every 3 minutes PRN For ANY of the following changes in patient status:  A. RR LESS THAN 10 breaths per minute, B. Oxygen saturation LESS THAN 90%, C. Sedation score of 6  ondansetron Injectable 4 milliGRAM(s) IV Push every 6 hours PRN Nausea

## 2021-04-08 NOTE — PROGRESS NOTE ADULT - ASSESSMENT
70 year old Jehovah witness, PMHx HTN, HLD, Type 2 DM, CAD, and recent dx of malignant neoplasm of left colon, s/p laparoscopic converted to open left hemicolectomy on 04/05, recovering appropriately on the floors.    PLAN:  - NPO/NGT/IVF  - f/u AM labs, trend H/H  - D/c Gomez  - Incentive spirometry  - Pain control: PCA, IV tylenol  - encourage ambulation, OOB to chair  - VTE prophylaxis with SQH    A Team Surgery   [id number]    70 year old Jehovah witness, PMHx HTN, HLD, Type 2 DM, CAD, and recent dx of malignant neoplasm of left colon, s/p laparoscopic converted to open left hemicolectomy on 04/05, recovering appropriately on the floors.    PLAN:  - NPO/NGT/IVF  - cont to trend H/H  - D/c Gomez  - Incentive spirometry  - Pain control: PCA, IV tylenol  - encourage ambulation, OOB to chair  - VTE prophylaxis with SQH    A Team Surgery   [id number]

## 2021-04-08 NOTE — PROGRESS NOTE ADULT - SUBJECTIVE AND OBJECTIVE BOX
Anesthesia Pain Management Service    SUBJECTIVE: Patient is doing well with IV PCA and no significant problems reported.  Patient is however complaining of sore throat from NGT.    Pain Scale Score	At rest: ___ 	With Activity: ___ 	[X ] Refer to charted pain scores    THERAPY:    [ ] IV PCA Morphine		[ ] 5 mg/mL	[ ] 1 mg/mL  [X ] IV PCA Hydromorphone	[ ] 5 mg/mL	[X ] 1 mg/mL  [ ] IV PCA Fentanyl		[ ] 50 micrograms/mL    Demand dose __0.2_ lockout __6_ (minutes) Continuous Rate _0__ Total: __5.8_  mg used (in past 24 hours)      MEDICATIONS  (STANDING):  acetaminophen  IVPB .. 1000 milliGRAM(s) IV Intermittent every 6 hours  chlorhexidine 2% Cloths 1 Application(s) Topical <User Schedule>  dextrose 5% + sodium chloride 0.45%. 1000 milliLiter(s) (75 mL/Hr) IV Continuous <Continuous>  dextrose 5%. 1000 milliLiter(s) (50 mL/Hr) IV Continuous <Continuous>  dextrose 5%. 1000 milliLiter(s) (100 mL/Hr) IV Continuous <Continuous>  dextrose 50% Injectable 25 Gram(s) IV Push once  dextrose 50% Injectable 12.5 Gram(s) IV Push once  dextrose 50% Injectable 25 Gram(s) IV Push once  enoxaparin Injectable 40 milliGRAM(s) SubCutaneous daily  glucagon  Injectable 1 milliGRAM(s) IntraMuscular once  HYDROmorphone PCA (1 mG/mL) 30 milliLiter(s) PCA Continuous PCA Continuous  insulin lispro (ADMELOG) corrective regimen sliding scale   SubCutaneous every 6 hours  metoprolol tartrate Injectable 5 milliGRAM(s) IV Push every 6 hours  sodium chloride 0.9% lock flush 3 milliLiter(s) IV Push every 8 hours    MEDICATIONS  (PRN):  HYDROmorphone PCA (1 mG/mL) Rescue Clinician Bolus 0.5 milliGRAM(s) IV Push every 15 minutes PRN for Pain Scale GREATER THAN 6  naloxone Injectable 0.1 milliGRAM(s) IV Push every 3 minutes PRN For ANY of the following changes in patient status:  A. RR LESS THAN 10 breaths per minute, B. Oxygen saturation LESS THAN 90%, C. Sedation score of 6  ondansetron Injectable 4 milliGRAM(s) IV Push every 6 hours PRN Nausea      OBJECTIVE:    Sedation Score:	[ X] Alert	[ ] Drowsy 	[ ] Arousable	[ ] Asleep	[ ] Unresponsive    Side Effects:	[X ] None	[ ] Nausea	[ ] Vomiting	[ ] Pruritus  		[ ] Other:    Vital Signs Last 24 Hrs  T(C): 36.9 (08 Apr 2021 06:43), Max: 37.6 (07 Apr 2021 22:39)  T(F): 98.4 (08 Apr 2021 06:43), Max: 99.6 (07 Apr 2021 22:39)  HR: 75 (08 Apr 2021 06:43) (71 - 88)  BP: 150/62 (08 Apr 2021 06:43) (117/44 - 154/56)  BP(mean): --  RR: 18 (08 Apr 2021 06:43) (18 - 18)  SpO2: 93% (08 Apr 2021 06:43) (93% - 100%)    ASSESSMENT/ PLAN    Therapy to  be:	[ X] Continue   [ ] Discontinued   [ ] Change to prn Analgesics    Documentation and Verification of current medications:   [X] Done	[ ] Not done, not elligible    Comments: Recommend non-opioid adjuvant analgesics to be used when possible and when allowed by primary surgical team.    Will add cepacol lozenge for throat pain.    Progress Note written now but Patient was seen earlier.

## 2021-04-09 LAB
ANION GAP SERPL CALC-SCNC: 12 MMOL/L — SIGNIFICANT CHANGE UP (ref 7–14)
BUN SERPL-MCNC: 7 MG/DL — SIGNIFICANT CHANGE UP (ref 7–23)
CALCIUM SERPL-MCNC: 9 MG/DL — SIGNIFICANT CHANGE UP (ref 8.4–10.5)
CHLORIDE SERPL-SCNC: 102 MMOL/L — SIGNIFICANT CHANGE UP (ref 98–107)
CO2 SERPL-SCNC: 20 MMOL/L — LOW (ref 22–31)
CREAT SERPL-MCNC: 0.74 MG/DL — SIGNIFICANT CHANGE UP (ref 0.5–1.3)
GLUCOSE BLDC GLUCOMTR-MCNC: 134 MG/DL — HIGH (ref 70–99)
GLUCOSE BLDC GLUCOMTR-MCNC: 135 MG/DL — HIGH (ref 70–99)
GLUCOSE BLDC GLUCOMTR-MCNC: 136 MG/DL — HIGH (ref 70–99)
GLUCOSE SERPL-MCNC: 129 MG/DL — HIGH (ref 70–99)
HCT VFR BLD CALC: 21.2 % — LOW (ref 34.5–45)
HGB BLD-MCNC: 7 G/DL — CRITICAL LOW (ref 11.5–15.5)
MAGNESIUM SERPL-MCNC: 2 MG/DL — SIGNIFICANT CHANGE UP (ref 1.6–2.6)
MCHC RBC-ENTMCNC: 31 PG — SIGNIFICANT CHANGE UP (ref 27–34)
MCHC RBC-ENTMCNC: 33 GM/DL — SIGNIFICANT CHANGE UP (ref 32–36)
MCV RBC AUTO: 93.8 FL — SIGNIFICANT CHANGE UP (ref 80–100)
NRBC # BLD: 0 /100 WBCS — SIGNIFICANT CHANGE UP
NRBC # FLD: 0 K/UL — SIGNIFICANT CHANGE UP
PHOSPHATE SERPL-MCNC: 2.4 MG/DL — LOW (ref 2.5–4.5)
PLATELET # BLD AUTO: 323 K/UL — SIGNIFICANT CHANGE UP (ref 150–400)
POTASSIUM SERPL-MCNC: 4.6 MMOL/L — SIGNIFICANT CHANGE UP (ref 3.5–5.3)
POTASSIUM SERPL-SCNC: 4.6 MMOL/L — SIGNIFICANT CHANGE UP (ref 3.5–5.3)
RBC # BLD: 2.26 M/UL — LOW (ref 3.8–5.2)
RBC # FLD: 13.2 % — SIGNIFICANT CHANGE UP (ref 10.3–14.5)
SODIUM SERPL-SCNC: 134 MMOL/L — LOW (ref 135–145)
WBC # BLD: 9.31 K/UL — SIGNIFICANT CHANGE UP (ref 3.8–10.5)
WBC # FLD AUTO: 9.31 K/UL — SIGNIFICANT CHANGE UP (ref 3.8–10.5)

## 2021-04-09 RX ORDER — SODIUM,POTASSIUM PHOSPHATES 278-250MG
1 POWDER IN PACKET (EA) ORAL
Refills: 0 | Status: DISCONTINUED | OUTPATIENT
Start: 2021-04-09 | End: 2021-04-09

## 2021-04-09 RX ORDER — POTASSIUM PHOSPHATE, MONOBASIC POTASSIUM PHOSPHATE, DIBASIC 236; 224 MG/ML; MG/ML
15 INJECTION, SOLUTION INTRAVENOUS ONCE
Refills: 0 | Status: DISCONTINUED | OUTPATIENT
Start: 2021-04-09 | End: 2021-04-09

## 2021-04-09 RX ORDER — HEPARIN SODIUM 5000 [USP'U]/ML
5000 INJECTION INTRAVENOUS; SUBCUTANEOUS EVERY 12 HOURS
Refills: 0 | Status: DISCONTINUED | OUTPATIENT
Start: 2021-04-09 | End: 2021-04-13

## 2021-04-09 RX ORDER — SODIUM,POTASSIUM PHOSPHATES 278-250MG
1 POWDER IN PACKET (EA) ORAL THREE TIMES A DAY
Refills: 0 | Status: COMPLETED | OUTPATIENT
Start: 2021-04-09 | End: 2021-04-10

## 2021-04-09 RX ORDER — ERYTHROPOIETIN 10000 [IU]/ML
10000 INJECTION, SOLUTION INTRAVENOUS; SUBCUTANEOUS ONCE
Refills: 0 | Status: COMPLETED | OUTPATIENT
Start: 2021-04-09 | End: 2021-04-09

## 2021-04-09 RX ORDER — HYDRALAZINE HCL 50 MG
25 TABLET ORAL
Refills: 0 | Status: DISCONTINUED | OUTPATIENT
Start: 2021-04-09 | End: 2021-04-13

## 2021-04-09 RX ORDER — AMLODIPINE BESYLATE 2.5 MG/1
10 TABLET ORAL DAILY
Refills: 0 | Status: DISCONTINUED | OUTPATIENT
Start: 2021-04-09 | End: 2021-04-13

## 2021-04-09 RX ORDER — OXYCODONE HYDROCHLORIDE 5 MG/1
10 TABLET ORAL EVERY 6 HOURS
Refills: 0 | Status: DISCONTINUED | OUTPATIENT
Start: 2021-04-09 | End: 2021-04-13

## 2021-04-09 RX ORDER — HYDROCHLOROTHIAZIDE 25 MG
25 TABLET ORAL DAILY
Refills: 0 | Status: DISCONTINUED | OUTPATIENT
Start: 2021-04-09 | End: 2021-04-13

## 2021-04-09 RX ORDER — CARVEDILOL PHOSPHATE 80 MG/1
6.25 CAPSULE, EXTENDED RELEASE ORAL EVERY 12 HOURS
Refills: 0 | Status: DISCONTINUED | OUTPATIENT
Start: 2021-04-09 | End: 2021-04-13

## 2021-04-09 RX ORDER — ERYTHROPOIETIN 10000 [IU]/ML
10000 INJECTION, SOLUTION INTRAVENOUS; SUBCUTANEOUS ONCE
Refills: 0 | Status: DISCONTINUED | OUTPATIENT
Start: 2021-04-09 | End: 2021-04-09

## 2021-04-09 RX ORDER — OXYCODONE HYDROCHLORIDE 5 MG/1
5 TABLET ORAL EVERY 6 HOURS
Refills: 0 | Status: DISCONTINUED | OUTPATIENT
Start: 2021-04-09 | End: 2021-04-13

## 2021-04-09 RX ADMIN — Medication 5 MILLIGRAM(S): at 05:13

## 2021-04-09 RX ADMIN — CARVEDILOL PHOSPHATE 6.25 MILLIGRAM(S): 80 CAPSULE, EXTENDED RELEASE ORAL at 17:11

## 2021-04-09 RX ADMIN — Medication 1 PACKET(S): at 22:46

## 2021-04-09 RX ADMIN — Medication 63.75 MILLIMOLE(S): at 17:11

## 2021-04-09 RX ADMIN — Medication 1000 MILLIGRAM(S): at 17:41

## 2021-04-09 RX ADMIN — ENOXAPARIN SODIUM 40 MILLIGRAM(S): 100 INJECTION SUBCUTANEOUS at 12:01

## 2021-04-09 RX ADMIN — Medication 5 MILLIGRAM(S): at 12:01

## 2021-04-09 RX ADMIN — Medication 1000 MILLIGRAM(S): at 00:11

## 2021-04-09 RX ADMIN — Medication 25 MILLIGRAM(S): at 17:12

## 2021-04-09 RX ADMIN — Medication 1000 MILLIGRAM(S): at 12:31

## 2021-04-09 RX ADMIN — Medication 400 MILLIGRAM(S): at 12:01

## 2021-04-09 RX ADMIN — SODIUM CHLORIDE 50 MILLILITER(S): 9 INJECTION, SOLUTION INTRAVENOUS at 17:10

## 2021-04-09 RX ADMIN — Medication 400 MILLIGRAM(S): at 05:13

## 2021-04-09 RX ADMIN — HYDROMORPHONE HYDROCHLORIDE 30 MILLILITER(S): 2 INJECTION INTRAMUSCULAR; INTRAVENOUS; SUBCUTANEOUS at 08:24

## 2021-04-09 RX ADMIN — SODIUM CHLORIDE 3 MILLILITER(S): 9 INJECTION INTRAMUSCULAR; INTRAVENOUS; SUBCUTANEOUS at 05:00

## 2021-04-09 RX ADMIN — Medication 400 MILLIGRAM(S): at 17:11

## 2021-04-09 RX ADMIN — SODIUM CHLORIDE 3 MILLILITER(S): 9 INJECTION INTRAMUSCULAR; INTRAVENOUS; SUBCUTANEOUS at 22:47

## 2021-04-09 RX ADMIN — SODIUM CHLORIDE 50 MILLILITER(S): 9 INJECTION, SOLUTION INTRAVENOUS at 12:01

## 2021-04-09 RX ADMIN — ERYTHROPOIETIN 10000 UNIT(S): 10000 INJECTION, SOLUTION INTRAVENOUS; SUBCUTANEOUS at 18:48

## 2021-04-09 RX ADMIN — OXYCODONE HYDROCHLORIDE 10 MILLIGRAM(S): 5 TABLET ORAL at 19:39

## 2021-04-09 NOTE — PROGRESS NOTE ADULT - SUBJECTIVE AND OBJECTIVE BOX
Anesthesia Pain Management Service    SUBJECTIVE: Patient is doing well with IV PCA and no significant problems reported.    Pain Scale Score	At rest: _0/10__ 	With Activity: ___ 	[X ] Refer to charted pain scores    THERAPY:    [ ] IV PCA Morphine		[ ] 5 mg/mL	[ ] 1 mg/mL  [X ] IV PCA Hydromorphone	[ ] 5 mg/mL	[X ] 1 mg/mL  [ ] IV PCA Fentanyl		[ ] 50 micrograms/mL    Demand dose __0.2_ lockout __6_ (minutes) Continuous Rate _0__ Total: 4.4__   mg used (in past 24 hrs)      MEDICATIONS  (STANDING):  acetaminophen  IVPB .. 1000 milliGRAM(s) IV Intermittent every 6 hours  chlorhexidine 2% Cloths 1 Application(s) Topical <User Schedule>  dextrose 5% + sodium chloride 0.45%. 1000 milliLiter(s) (50 mL/Hr) IV Continuous <Continuous>  dextrose 5%. 1000 milliLiter(s) (50 mL/Hr) IV Continuous <Continuous>  dextrose 5%. 1000 milliLiter(s) (100 mL/Hr) IV Continuous <Continuous>  dextrose 50% Injectable 25 Gram(s) IV Push once  dextrose 50% Injectable 12.5 Gram(s) IV Push once  dextrose 50% Injectable 25 Gram(s) IV Push once  enoxaparin Injectable 40 milliGRAM(s) SubCutaneous daily  glucagon  Injectable 1 milliGRAM(s) IntraMuscular once  insulin lispro (ADMELOG) corrective regimen sliding scale   SubCutaneous every 6 hours  metoprolol tartrate Injectable 5 milliGRAM(s) IV Push every 6 hours  sodium chloride 0.9% lock flush 3 milliLiter(s) IV Push every 8 hours    MEDICATIONS  (PRN):  benzocaine 15 mG/menthol 3.6 mG (Sugar-Free) Lozenge 1 Lozenge Oral every 4 hours PRN Sore Throat  naloxone Injectable 0.1 milliGRAM(s) IV Push every 3 minutes PRN For ANY of the following changes in patient status:  A. RR LESS THAN 10 breaths per minute, B. Oxygen saturation LESS THAN 90%, C. Sedation score of 6  ondansetron Injectable 4 milliGRAM(s) IV Push every 6 hours PRN Nausea  oxyCODONE    IR 5 milliGRAM(s) Oral every 6 hours PRN Moderate Pain (4 - 6)  oxyCODONE    IR 10 milliGRAM(s) Oral every 6 hours PRN Severe Pain (7 - 10)      OBJECTIVE:  Patient is sitting up in chair.     Sedation Score:	[ X] Alert	[ ] Drowsy 	[ ] Arousable	[ ] Asleep	[ ] Unresponsive    Side Effects:	[X ] None	[ ] Nausea	[ ] Vomiting	[ ] Pruritus  		[ ] Other:    Vital Signs Last 24 Hrs  T(C): 36.4 (09 Apr 2021 10:19), Max: 37.2 (08 Apr 2021 22:27)  T(F): 97.6 (09 Apr 2021 10:19), Max: 99 (08 Apr 2021 22:27)  HR: 73 (09 Apr 2021 10:19) (69 - 82)  BP: 162/53 (09 Apr 2021 10:19) (125/47 - 162/53)  BP(mean): --  RR: 18 (09 Apr 2021 10:19) (18 - 19)  SpO2: 100% (09 Apr 2021 10:19) (97% - 100%)    ASSESSMENT/ PLAN    Therapy to  be:	[ ] Continue   [ X] Discontinued   [X ] Change to prn Analgesics    Documentation and Verification of current medications:   [X] Done	[ ] Not done, not elligible    Comments: IV Dilaudid PCA discontinued by team and they ordered PRN Oral/IV opioids and/or Adjuvant non-opioid medications.    Progress Note written now but Patient was seen earlier.

## 2021-04-09 NOTE — PROGRESS NOTE ADULT - SUBJECTIVE AND OBJECTIVE BOX
SURGERY PROGRESS NOTE    SUBJECTIVE / 24H EVENTS:  Patient seen and examined on morning rounds. Yesterday, passed TOV, NGT removed but patient remains NPO. No acute events overnight.      OBJECTIVE:  VITAL SIGNS:  T(C): 37.2 (04-08-21 @ 22:27), Max: 37.2 (04-08-21 @ 22:27)  HR: 70 (04-08-21 @ 22:27) (70 - 82)  BP: 150/71 (04-08-21 @ 22:27) (150/62 - 160/80)  RR: 19 (04-08-21 @ 22:27) (17 - 19)  SpO2: 99% (04-08-21 @ 22:27) (93% - 99%)  Daily     Daily   POCT Blood Glucose.: 137 mg/dL (04-08-21 @ 23:34)  POCT Blood Glucose.: 161 mg/dL (04-08-21 @ 17:56)  POCT Blood Glucose.: 135 mg/dL (04-08-21 @ 12:21)      PHYSICAL EXAM:  Gen: NAD, resting in bed, alert and responding appropriately  Resp: Non-labored respirations  Abd: Soft, Obese, appropriately tender to palpation. Midline incision with Aquacel dressing c/d/i, post sites covered with opsite dressings c/d/i. GALINA Drain in RLQ with s/s output  Ext: Grossly moving all extremities normally      04-07-21 @ 07:01  -  04-08-21 @ 07:00  --------------------------------------------------------  IN:    dextrose 5% + sodium chloride 0.45%: 1000 mL    IV PiggyBack: 100 mL  Total IN: 1100 mL    OUT:    Bulb (mL): 12.5 mL    Indwelling Catheter - Urethral (mL): 1600 mL    Nasogastric/Oral tube (mL): 400 mL    Oral Fluid: 0 mL  Total OUT: 2012.5 mL    Total NET: -912.5 mL      04-08-21 @ 07:01  -  04-09-21 @ 01:40  --------------------------------------------------------  IN:  Total IN: 0 mL    OUT:    Bulb (mL): 20 mL    Nasogastric/Oral tube (mL): 300 mL    Oral Fluid: 0 mL    Voided (mL): 1300 mL  Total OUT: 1620 mL    Total NET: -1620 mL          LAB VALUES:  04-08    137  |  101  |  9   ----------------------------<  114<H>  3.7   |  27  |  0.90    Ca    8.2<L>      08 Apr 2021 07:55  Phos  2.1     04-08  Mg     1.9     04-08                                 7.6    13.82 )-----------( 309      ( 08 Apr 2021 07:55 )             23.5                   MICROBIOLOGY:      RADIOLOGY:        MEDICATIONS  (STANDING):  acetaminophen  IVPB .. 1000 milliGRAM(s) IV Intermittent every 6 hours  chlorhexidine 2% Cloths 1 Application(s) Topical <User Schedule>  dextrose 5% + sodium chloride 0.45%. 1000 milliLiter(s) (75 mL/Hr) IV Continuous <Continuous>  dextrose 5%. 1000 milliLiter(s) (50 mL/Hr) IV Continuous <Continuous>  dextrose 5%. 1000 milliLiter(s) (100 mL/Hr) IV Continuous <Continuous>  dextrose 50% Injectable 25 Gram(s) IV Push once  dextrose 50% Injectable 12.5 Gram(s) IV Push once  dextrose 50% Injectable 25 Gram(s) IV Push once  enoxaparin Injectable 40 milliGRAM(s) SubCutaneous daily  glucagon  Injectable 1 milliGRAM(s) IntraMuscular once  HYDROmorphone PCA (1 mG/mL) 30 milliLiter(s) PCA Continuous PCA Continuous  insulin lispro (ADMELOG) corrective regimen sliding scale   SubCutaneous every 6 hours  metoprolol tartrate Injectable 5 milliGRAM(s) IV Push every 6 hours  sodium chloride 0.9% lock flush 3 milliLiter(s) IV Push every 8 hours    MEDICATIONS  (PRN):  benzocaine 15 mG/menthol 3.6 mG (Sugar-Free) Lozenge 1 Lozenge Oral every 4 hours PRN Sore Throat  HYDROmorphone PCA (1 mG/mL) Rescue Clinician Bolus 0.5 milliGRAM(s) IV Push every 15 minutes PRN for Pain Scale GREATER THAN 6  naloxone Injectable 0.1 milliGRAM(s) IV Push every 3 minutes PRN For ANY of the following changes in patient status:  A. RR LESS THAN 10 breaths per minute, B. Oxygen saturation LESS THAN 90%, C. Sedation score of 6  ondansetron Injectable 4 milliGRAM(s) IV Push every 6 hours PRN Nausea        SURGERY PROGRESS NOTE    SUBJECTIVE / 24H EVENTS:  Patient seen and examined on morning rounds. Yesterday, passed TOV, NGT removed but patient remains NPO. No acute events overnight.      OBJECTIVE:  ICU Vital Signs Last 24 Hrs  T(C): 36.6 (09 Apr 2021 05:11), Max: 37.2 (08 Apr 2021 22:27)  T(F): 97.8 (09 Apr 2021 05:11), Max: 99 (08 Apr 2021 22:27)  HR: 71 (09 Apr 2021 05:11) (69 - 82)  BP: 125/47 (09 Apr 2021 05:11) (125/47 - 160/80)  RR: 18 (09 Apr 2021 05:11) (17 - 19)  SpO2: 97% (09 Apr 2021 05:11) (95% - 99%)    Daily   POCT Blood Glucose.: 137 mg/dL (04-08-21 @ 23:34)  POCT Blood Glucose.: 161 mg/dL (04-08-21 @ 17:56)  POCT Blood Glucose.: 135 mg/dL (04-08-21 @ 12:21)      PHYSICAL EXAM:  Gen: NAD, resting in bed, alert and responding appropriately  Resp: Non-labored respirations  Abd: Soft, Obese, appropriately tender to palpation. Midline incision with Aquacel dressing c/d/i, post sites covered with opsite dressings c/d/i. GALINA Drain in RLQ with s/s output  Ext: Grossly moving all extremities normally      04-07-21 @ 07:01  -  04-08-21 @ 07:00  --------------------------------------------------------  IN:    dextrose 5% + sodium chloride 0.45%: 1000 mL    IV PiggyBack: 100 mL  Total IN: 1100 mL    OUT:    Bulb (mL): 12.5 mL    Indwelling Catheter - Urethral (mL): 1600 mL    Nasogastric/Oral tube (mL): 400 mL    Oral Fluid: 0 mL  Total OUT: 2012.5 mL    Total NET: -912.5 mL      04-08-21 @ 07:01  -  04-09-21 @ 01:40  --------------------------------------------------------  IN:  Total IN: 0 mL    OUT:    Bulb (mL): 20 mL    Nasogastric/Oral tube (mL): 300 mL    Oral Fluid: 0 mL    Voided (mL): 1300 mL  Total OUT: 1620 mL    Total NET: -1620 mL    I&O's Summary    08 Apr 2021 07:01  -  09 Apr 2021 07:00  --------------------------------------------------------  IN: 900 mL / OUT: 2020 mL / NET: -1120 mL          LAB VALUES:             04-08    137  |  101  |  9   ----------------------------<  114<H>  3.7   |  27  |  0.90    Ca    8.2<L>      08 Apr 2021 07:55  Phos  2.1     04-08  Mg     1.9     04-08                                 7.6    13.82 )-----------( 309      ( 08 Apr 2021 07:55 )             23.5                   MICROBIOLOGY:      RADIOLOGY:        MEDICATIONS  (STANDING):  acetaminophen  IVPB .. 1000 milliGRAM(s) IV Intermittent every 6 hours  chlorhexidine 2% Cloths 1 Application(s) Topical <User Schedule>  dextrose 5% + sodium chloride 0.45%. 1000 milliLiter(s) (75 mL/Hr) IV Continuous <Continuous>  dextrose 5%. 1000 milliLiter(s) (50 mL/Hr) IV Continuous <Continuous>  dextrose 5%. 1000 milliLiter(s) (100 mL/Hr) IV Continuous <Continuous>  dextrose 50% Injectable 25 Gram(s) IV Push once  dextrose 50% Injectable 12.5 Gram(s) IV Push once  dextrose 50% Injectable 25 Gram(s) IV Push once  enoxaparin Injectable 40 milliGRAM(s) SubCutaneous daily  glucagon  Injectable 1 milliGRAM(s) IntraMuscular once  HYDROmorphone PCA (1 mG/mL) 30 milliLiter(s) PCA Continuous PCA Continuous  insulin lispro (ADMELOG) corrective regimen sliding scale   SubCutaneous every 6 hours  metoprolol tartrate Injectable 5 milliGRAM(s) IV Push every 6 hours  sodium chloride 0.9% lock flush 3 milliLiter(s) IV Push every 8 hours    MEDICATIONS  (PRN):  benzocaine 15 mG/menthol 3.6 mG (Sugar-Free) Lozenge 1 Lozenge Oral every 4 hours PRN Sore Throat  HYDROmorphone PCA (1 mG/mL) Rescue Clinician Bolus 0.5 milliGRAM(s) IV Push every 15 minutes PRN for Pain Scale GREATER THAN 6  naloxone Injectable 0.1 milliGRAM(s) IV Push every 3 minutes PRN For ANY of the following changes in patient status:  A. RR LESS THAN 10 breaths per minute, B. Oxygen saturation LESS THAN 90%, C. Sedation score of 6  ondansetron Injectable 4 milliGRAM(s) IV Push every 6 hours PRN Nausea

## 2021-04-09 NOTE — PROGRESS NOTE ADULT - ASSESSMENT
Patient seen and examined with Dr. Avalos.    70 year old Jehovah witness, PMHx HTN, HLD, Type 2 DM, CAD, and recent dx of malignant neoplasm of left colon, s/p laparoscopic converted to open left hemicolectomy, POD#4, asymptomatic drop in H/H since OR.    PLAN:    - Diet: CLD  - Passed TOV  - OOBA, Incentive spirometry  - Pain control: d/c PCA transition to PO pain meds PRN  - VTE prophylaxis with Heparin subcutaneous  - cont to monitor H/H trends    A Team Surgery [id number]

## 2021-04-09 NOTE — PROGRESS NOTE ADULT - ASSESSMENT
70 year old Jehovah witness, PMHx HTN, HLD, Type 2 DM, CAD, and recent dx of malignant neoplasm of left colon, s/p laparoscopic converted to open left hemicolectomy on 04/05, recovering appropriately on the floors.    PLAN:  - NPO/IVF  - cont to trend H/H  - Incentive spirometry  - Pain control: PCA, IV tylenol  - encourage ambulation, OOB to chair  - VTE prophylaxis with SQH    A Team Surgery   [id number]    70 year old Jehovah witness, PMHx HTN, HLD, Type 2 DM, CAD, and recent dx of malignant neoplasm of left colon, s/p laparoscopic converted to open left hemicolectomy on 04/05, recovering appropriately on the floors.    PLAN:  - NPO/IVF; will advance to CLD and dec IVF to 50  - cont to trend H/H  - Incentive spirometry  - Pain control: PCA, IV tylenol  - encourage ambulation, OOB to chair  - VTE prophylaxis with SQH    A Team Surgery   [id number]

## 2021-04-09 NOTE — PROGRESS NOTE ADULT - SUBJECTIVE AND OBJECTIVE BOX
DATE OF SERVICE: 04-09-21 @ 08:05    INTERVAL HPI/OVERNIGHT EVENTS: Patient seen and examined, resting comfortably at bedside awake and alert. No acute events overnight. Patient passed TOV, NGT d/c but patient remains NPO, reports sore throat improvement. Patient denies any nausea, vomiting. Admits to passing flatus but denies having bowel movements. Patient endorses voiding without issues and has been out of bed and ambulating. Denies fever, chills, SOB, or chest pain. Denies dizziness, headaches or syncope. Pain well controlled.    STATUS POST:  left hemicolectomy    POST OPERATIVE DAY #: 4    MEDICATIONS  (STANDING):  acetaminophen  IVPB .. 1000 milliGRAM(s) IV Intermittent every 6 hours  chlorhexidine 2% Cloths 1 Application(s) Topical <User Schedule>  dextrose 5% + sodium chloride 0.45%. 1000 milliLiter(s) (75 mL/Hr) IV Continuous <Continuous>  dextrose 5%. 1000 milliLiter(s) (50 mL/Hr) IV Continuous <Continuous>  dextrose 5%. 1000 milliLiter(s) (100 mL/Hr) IV Continuous <Continuous>  dextrose 50% Injectable 25 Gram(s) IV Push once  dextrose 50% Injectable 12.5 Gram(s) IV Push once  dextrose 50% Injectable 25 Gram(s) IV Push once  enoxaparin Injectable 40 milliGRAM(s) SubCutaneous daily  glucagon  Injectable 1 milliGRAM(s) IntraMuscular once  HYDROmorphone PCA (1 mG/mL) 30 milliLiter(s) PCA Continuous PCA Continuous  insulin lispro (ADMELOG) corrective regimen sliding scale   SubCutaneous every 6 hours  metoprolol tartrate Injectable 5 milliGRAM(s) IV Push every 6 hours  sodium chloride 0.9% lock flush 3 milliLiter(s) IV Push every 8 hours    MEDICATIONS  (PRN):  benzocaine 15 mG/menthol 3.6 mG (Sugar-Free) Lozenge 1 Lozenge Oral every 4 hours PRN Sore Throat  HYDROmorphone PCA (1 mG/mL) Rescue Clinician Bolus 0.5 milliGRAM(s) IV Push every 15 minutes PRN for Pain Scale GREATER THAN 6  naloxone Injectable 0.1 milliGRAM(s) IV Push every 3 minutes PRN For ANY of the following changes in patient status:  A. RR LESS THAN 10 breaths per minute, B. Oxygen saturation LESS THAN 90%, C. Sedation score of 6  ondansetron Injectable 4 milliGRAM(s) IV Push every 6 hours PRN Nausea      Vital Signs Last 24 Hrs  T(C): 36.6 (09 Apr 2021 05:11), Max: 37.2 (08 Apr 2021 22:27)  T(F): 97.8 (09 Apr 2021 05:11), Max: 99 (08 Apr 2021 22:27)  HR: 71 (09 Apr 2021 05:11) (69 - 82)  BP: 125/47 (09 Apr 2021 05:11) (125/47 - 160/80)  BP(mean): --  RR: 18 (09 Apr 2021 05:11) (17 - 19)  SpO2: 97% (09 Apr 2021 05:11) (95% - 99%)  I&O's Detail    08 Apr 2021 07:01  -  09 Apr 2021 07:00  --------------------------------------------------------  IN:    dextrose 5% + sodium chloride 0.45%: 900 mL  Total IN: 900 mL    OUT:    Bulb (mL): 20 mL    Nasogastric/Oral tube (mL): 300 mL    Oral Fluid: 0 mL    Voided (mL): 1700 mL  Total OUT: 2020 mL    Total NET: -1120 mL          REVIEW OF SYSTEMS:  CONSTITUTIONAL: No weakness, fevers or chills  EYES/ENT: No visual changes;  No vertigo or throat pain   NECK: No pain or stiffness  RESPIRATORY: No cough, wheezing, hemoptysis; No shortness of breath  CARDIOVASCULAR: No chest pain or palpitations  GASTROINTESTINAL: No abdominal or epigastric pain. No nausea, vomiting, or hematemesis; No diarrhea or constipation. No melena or hematochezia.  GENITOURINARY: No dysuria, frequency or hematuria  NEUROLOGICAL: No numbness or weakness  SKIN: No itching, burning, rashes, or lesions   All other review of systems is negative unless indicated above.    Physical Exam:  General: WN/WD NAD, no scleral icterus  Respiratory: airway patent, respirations unlabored, no increased WoB  Neurology: A&Ox3, nonfocal, PERSON x 4  Abdominal: Soft, obese, appropriately tender to palpation at midline incision, ND, no rebounding or guarding  Midline incision dressed with Aquacel: C/D/I, minimal strikethrough  Port incisions dressed with OPSites: C/D/I, no strikethrough appreciated  GALINA Drain: patent with minimal SSF output      LABS:                        7.6    13.82 )-----------( 309      ( 08 Apr 2021 07:55 )             23.5     04-08    137  |  101  |  9   ----------------------------<  114<H>  3.7   |  27  |  0.90    Ca    8.2<L>      08 Apr 2021 07:55  Phos  2.1     04-08  Mg     1.9     04-08

## 2021-04-10 ENCOUNTER — TRANSCRIPTION ENCOUNTER (OUTPATIENT)
Age: 71
End: 2021-04-10

## 2021-04-10 LAB
ANION GAP SERPL CALC-SCNC: 10 MMOL/L — SIGNIFICANT CHANGE UP (ref 7–14)
APTT BLD: 31.9 SEC — SIGNIFICANT CHANGE UP (ref 27–36.3)
BUN SERPL-MCNC: 6 MG/DL — LOW (ref 7–23)
CALCIUM SERPL-MCNC: 8.8 MG/DL — SIGNIFICANT CHANGE UP (ref 8.4–10.5)
CHLORIDE SERPL-SCNC: 102 MMOL/L — SIGNIFICANT CHANGE UP (ref 98–107)
CO2 SERPL-SCNC: 26 MMOL/L — SIGNIFICANT CHANGE UP (ref 22–31)
CREAT SERPL-MCNC: 0.84 MG/DL — SIGNIFICANT CHANGE UP (ref 0.5–1.3)
FERRITIN SERPL-MCNC: 532 NG/ML — HIGH (ref 15–150)
GLUCOSE BLDC GLUCOMTR-MCNC: 103 MG/DL — HIGH (ref 70–99)
GLUCOSE BLDC GLUCOMTR-MCNC: 105 MG/DL — HIGH (ref 70–99)
GLUCOSE BLDC GLUCOMTR-MCNC: 118 MG/DL — HIGH (ref 70–99)
GLUCOSE BLDC GLUCOMTR-MCNC: 129 MG/DL — HIGH (ref 70–99)
GLUCOSE BLDC GLUCOMTR-MCNC: 136 MG/DL — HIGH (ref 70–99)
GLUCOSE BLDC GLUCOMTR-MCNC: 90 MG/DL — SIGNIFICANT CHANGE UP (ref 70–99)
GLUCOSE SERPL-MCNC: 99 MG/DL — SIGNIFICANT CHANGE UP (ref 70–99)
HCT VFR BLD CALC: 23.4 % — LOW (ref 34.5–45)
HGB BLD-MCNC: 7.6 G/DL — LOW (ref 11.5–15.5)
INR BLD: 1.09 RATIO — SIGNIFICANT CHANGE UP (ref 0.88–1.16)
IRON SATN MFR SERPL: 28 UG/DL — LOW (ref 30–160)
MAGNESIUM SERPL-MCNC: 1.8 MG/DL — SIGNIFICANT CHANGE UP (ref 1.6–2.6)
MCHC RBC-ENTMCNC: 30.9 PG — SIGNIFICANT CHANGE UP (ref 27–34)
MCHC RBC-ENTMCNC: 32.5 GM/DL — SIGNIFICANT CHANGE UP (ref 32–36)
MCV RBC AUTO: 95.1 FL — SIGNIFICANT CHANGE UP (ref 80–100)
NRBC # BLD: 0 /100 WBCS — SIGNIFICANT CHANGE UP
NRBC # FLD: 0 K/UL — SIGNIFICANT CHANGE UP
PHOSPHATE SERPL-MCNC: 2.9 MG/DL — SIGNIFICANT CHANGE UP (ref 2.5–4.5)
PLATELET # BLD AUTO: 370 K/UL — SIGNIFICANT CHANGE UP (ref 150–400)
POTASSIUM SERPL-MCNC: 3.5 MMOL/L — SIGNIFICANT CHANGE UP (ref 3.5–5.3)
POTASSIUM SERPL-SCNC: 3.5 MMOL/L — SIGNIFICANT CHANGE UP (ref 3.5–5.3)
PROTHROM AB SERPL-ACNC: 12.5 SEC — SIGNIFICANT CHANGE UP (ref 10.6–13.6)
RBC # BLD: 2.46 M/UL — LOW (ref 3.8–5.2)
RBC # FLD: 13.2 % — SIGNIFICANT CHANGE UP (ref 10.3–14.5)
SODIUM SERPL-SCNC: 138 MMOL/L — SIGNIFICANT CHANGE UP (ref 135–145)
WBC # BLD: 9.19 K/UL — SIGNIFICANT CHANGE UP (ref 3.8–10.5)
WBC # FLD AUTO: 9.19 K/UL — SIGNIFICANT CHANGE UP (ref 3.8–10.5)

## 2021-04-10 RX ORDER — INSULIN LISPRO 100/ML
VIAL (ML) SUBCUTANEOUS AT BEDTIME
Refills: 0 | Status: DISCONTINUED | OUTPATIENT
Start: 2021-04-10 | End: 2021-04-13

## 2021-04-10 RX ORDER — ERYTHROPOIETIN 10000 [IU]/ML
10000 INJECTION, SOLUTION INTRAVENOUS; SUBCUTANEOUS
Refills: 0 | Status: DISCONTINUED | OUTPATIENT
Start: 2021-04-10 | End: 2021-04-13

## 2021-04-10 RX ORDER — POTASSIUM CHLORIDE 20 MEQ
20 PACKET (EA) ORAL
Refills: 0 | Status: COMPLETED | OUTPATIENT
Start: 2021-04-10 | End: 2021-04-10

## 2021-04-10 RX ORDER — MAGNESIUM SULFATE 500 MG/ML
2 VIAL (ML) INJECTION ONCE
Refills: 0 | Status: COMPLETED | OUTPATIENT
Start: 2021-04-10 | End: 2021-04-10

## 2021-04-10 RX ORDER — SODIUM,POTASSIUM PHOSPHATES 278-250MG
1 POWDER IN PACKET (EA) ORAL
Refills: 0 | Status: COMPLETED | OUTPATIENT
Start: 2021-04-10 | End: 2021-04-11

## 2021-04-10 RX ORDER — ACETAMINOPHEN 500 MG
650 TABLET ORAL EVERY 6 HOURS
Refills: 0 | Status: DISCONTINUED | OUTPATIENT
Start: 2021-04-10 | End: 2021-04-13

## 2021-04-10 RX ORDER — INSULIN LISPRO 100/ML
VIAL (ML) SUBCUTANEOUS
Refills: 0 | Status: DISCONTINUED | OUTPATIENT
Start: 2021-04-10 | End: 2021-04-13

## 2021-04-10 RX ADMIN — Medication 400 MILLIGRAM(S): at 05:08

## 2021-04-10 RX ADMIN — SODIUM CHLORIDE 3 MILLILITER(S): 9 INJECTION INTRAMUSCULAR; INTRAVENOUS; SUBCUTANEOUS at 15:22

## 2021-04-10 RX ADMIN — Medication 20 MILLIEQUIVALENT(S): at 12:21

## 2021-04-10 RX ADMIN — SODIUM CHLORIDE 3 MILLILITER(S): 9 INJECTION INTRAMUSCULAR; INTRAVENOUS; SUBCUTANEOUS at 21:12

## 2021-04-10 RX ADMIN — OXYCODONE HYDROCHLORIDE 10 MILLIGRAM(S): 5 TABLET ORAL at 04:45

## 2021-04-10 RX ADMIN — Medication 400 MILLIGRAM(S): at 00:10

## 2021-04-10 RX ADMIN — OXYCODONE HYDROCHLORIDE 10 MILLIGRAM(S): 5 TABLET ORAL at 04:15

## 2021-04-10 RX ADMIN — CARVEDILOL PHOSPHATE 6.25 MILLIGRAM(S): 80 CAPSULE, EXTENDED RELEASE ORAL at 05:08

## 2021-04-10 RX ADMIN — Medication 25 MILLIGRAM(S): at 05:08

## 2021-04-10 RX ADMIN — OXYCODONE HYDROCHLORIDE 10 MILLIGRAM(S): 5 TABLET ORAL at 18:43

## 2021-04-10 RX ADMIN — Medication 50 GRAM(S): at 10:41

## 2021-04-10 RX ADMIN — OXYCODONE HYDROCHLORIDE 5 MILLIGRAM(S): 5 TABLET ORAL at 11:15

## 2021-04-10 RX ADMIN — OXYCODONE HYDROCHLORIDE 5 MILLIGRAM(S): 5 TABLET ORAL at 10:45

## 2021-04-10 RX ADMIN — Medication 20 MILLIEQUIVALENT(S): at 10:41

## 2021-04-10 RX ADMIN — Medication 25 MILLIGRAM(S): at 19:05

## 2021-04-10 RX ADMIN — HEPARIN SODIUM 5000 UNIT(S): 5000 INJECTION INTRAVENOUS; SUBCUTANEOUS at 05:08

## 2021-04-10 RX ADMIN — Medication 1 PACKET(S): at 05:08

## 2021-04-10 RX ADMIN — AMLODIPINE BESYLATE 10 MILLIGRAM(S): 2.5 TABLET ORAL at 05:08

## 2021-04-10 RX ADMIN — Medication 1 PACKET(S): at 12:21

## 2021-04-10 RX ADMIN — Medication 1 PACKET(S): at 19:26

## 2021-04-10 RX ADMIN — CHLORHEXIDINE GLUCONATE 1 APPLICATION(S): 213 SOLUTION TOPICAL at 05:08

## 2021-04-10 RX ADMIN — CARVEDILOL PHOSPHATE 6.25 MILLIGRAM(S): 80 CAPSULE, EXTENDED RELEASE ORAL at 19:04

## 2021-04-10 RX ADMIN — SODIUM CHLORIDE 3 MILLILITER(S): 9 INJECTION INTRAMUSCULAR; INTRAVENOUS; SUBCUTANEOUS at 05:08

## 2021-04-10 RX ADMIN — HEPARIN SODIUM 5000 UNIT(S): 5000 INJECTION INTRAVENOUS; SUBCUTANEOUS at 19:04

## 2021-04-10 RX ADMIN — Medication 1000 MILLIGRAM(S): at 00:31

## 2021-04-10 RX ADMIN — Medication 20 MILLIEQUIVALENT(S): at 19:26

## 2021-04-10 RX ADMIN — OXYCODONE HYDROCHLORIDE 10 MILLIGRAM(S): 5 TABLET ORAL at 19:25

## 2021-04-10 NOTE — PROGRESS NOTE ADULT - SUBJECTIVE AND OBJECTIVE BOX
SURGERY PROGRESS NOTE    SUBJECTIVE / 24H EVENTS:  Patient seen and examined on morning rounds. No acute events overnight. Patient IV locked.       OBJECTIVE:  VITAL SIGNS:  T(C): 36.6 (04-10-21 @ 02:10), Max: 37.1 (04-09-21 @ 02:26)  HR: 67 (04-10-21 @ 02:10) (67 - 84)  BP: 181/83 (04-10-21 @ 02:10) (120/71 - 181/83)  RR: 19 (04-10-21 @ 02:10) (16 - 19)  SpO2: 98% (04-10-21 @ 02:10) (97% - 100%)  Daily     Daily   POCT Blood Glucose.: 90 mg/dL (04-10-21 @ 00:18)  POCT Blood Glucose.: 135 mg/dL (04-09-21 @ 17:26)  POCT Blood Glucose.: 136 mg/dL (04-09-21 @ 11:59)      PHYSICAL EXAM:  Gen: NAD, resting in bed, alert and responding appropriately  Resp: Non-labored respirations  Abd: Soft, Obese, appropriately tender to palpation. Midline incision with Aquacel dressing c/d/i, post sites covered with opsite dressings c/d/i. GALINA Drain in RLQ with s/s output  Ext: Grossly moving all extremities normally      04-08-21 @ 07:01  -  04-09-21 @ 07:00  --------------------------------------------------------  IN:    dextrose 5% + sodium chloride 0.45%: 900 mL  Total IN: 900 mL    OUT:    Bulb (mL): 20 mL    Nasogastric/Oral tube (mL): 300 mL    Oral Fluid: 0 mL    Voided (mL): 1700 mL  Total OUT: 2020 mL    Total NET: -1120 mL      04-09-21 @ 07:01  -  04-10-21 @ 02:17  --------------------------------------------------------  IN:    dextrose 5% + sodium chloride 0.45%: 200 mL    IV PiggyBack: 250 mL    Oral Fluid: 200 mL  Total IN: 650 mL    OUT:    Bulb (mL): 20 mL    Voided (mL): 1200 mL  Total OUT: 1220 mL    Total NET: -570 mL          LAB VALUES:  04-09    134<L>  |  102  |  7   ----------------------------<  129<H>  4.6   |  20<L>  |  0.74    Ca    9.0      09 Apr 2021 11:25  Phos  2.4     04-09  Mg     2.0     04-09                                 7.0    9.31  )-----------( 323      ( 09 Apr 2021 14:32 )             21.2                   MICROBIOLOGY:      RADIOLOGY:        MEDICATIONS  (STANDING):  acetaminophen  IVPB .. 1000 milliGRAM(s) IV Intermittent every 6 hours  amLODIPine   Tablet 10 milliGRAM(s) Oral daily  carvedilol 6.25 milliGRAM(s) Oral every 12 hours  chlorhexidine 2% Cloths 1 Application(s) Topical <User Schedule>  dextrose 5%. 1000 milliLiter(s) (50 mL/Hr) IV Continuous <Continuous>  dextrose 5%. 1000 milliLiter(s) (100 mL/Hr) IV Continuous <Continuous>  dextrose 50% Injectable 25 Gram(s) IV Push once  dextrose 50% Injectable 12.5 Gram(s) IV Push once  dextrose 50% Injectable 25 Gram(s) IV Push once  glucagon  Injectable 1 milliGRAM(s) IntraMuscular once  heparin   Injectable 5000 Unit(s) SubCutaneous every 12 hours  hydrALAZINE 25 milliGRAM(s) Oral two times a day  hydrochlorothiazide 25 milliGRAM(s) Oral daily  insulin lispro (ADMELOG) corrective regimen sliding scale   SubCutaneous three times a day before meals  insulin lispro (ADMELOG) corrective regimen sliding scale   SubCutaneous at bedtime  potassium phosphate / sodium phosphate Powder (PHOS-NaK) 1 Packet(s) Oral three times a day  sodium chloride 0.9% lock flush 3 milliLiter(s) IV Push every 8 hours    MEDICATIONS  (PRN):  benzocaine 15 mG/menthol 3.6 mG (Sugar-Free) Lozenge 1 Lozenge Oral every 4 hours PRN Sore Throat  naloxone Injectable 0.1 milliGRAM(s) IV Push every 3 minutes PRN For ANY of the following changes in patient status:  A. RR LESS THAN 10 breaths per minute, B. Oxygen saturation LESS THAN 90%, C. Sedation score of 6  ondansetron Injectable 4 milliGRAM(s) IV Push every 6 hours PRN Nausea  oxyCODONE    IR 5 milliGRAM(s) Oral every 6 hours PRN Moderate Pain (4 - 6)  oxyCODONE    IR 10 milliGRAM(s) Oral every 6 hours PRN Severe Pain (7 - 10)

## 2021-04-10 NOTE — CONSULT NOTE ADULT - ASSESSMENT
Patient with colon cancer.  had a malignant polyp removed.  Now post op.  await final pathology for further oncology recommendations.    Anemia occurred post op and likely related to blood loss.  Hgb low but she refuses a transfusion due to her [employer] beliefs.  She was started on EDISON yesterday and got 10,000 units. She will get that TIW while here in hospital. The Hgb is rising today.  Decreased iron and increased ferritin c/w AOCD process and an acute phase reactant so would hold off on and iron replacement at this time.  Limit blood draws.     She is getting DVT prophylaxis with sq heparin.

## 2021-04-10 NOTE — CONSULT NOTE ADULT - SUBJECTIVE AND OBJECTIVE BOX
Chief Complaint:  Patient is a 70y old  Female who presents with a chief complaint of "I'm having sigmoid colon surgery" (30 Mar 2021 14:25)      HPI: patient had a positive cologard test and then had a colonoscopy and noted with a malignant polyp.  She is now s/p hemicolectomy.  pathology pending.  Asked to see patient for anemia.  She has a borderline anemia pre op (was not microcytic) and she has gotten progressively anemic post op.  She is a [employer] witness and will not consent to transfusions.  She was started on procrit yesterday. Hgb slightly higher today.  She says that she is ambulating without difficulty.      Medications:  acetaminophen   Tablet .. 650 milliGRAM(s) Oral every 6 hours PRN  amLODIPine   Tablet 10 milliGRAM(s) Oral daily  benzocaine 15 mG/menthol 3.6 mG (Sugar-Free) Lozenge 1 Lozenge Oral every 4 hours PRN  carvedilol 6.25 milliGRAM(s) Oral every 12 hours  chlorhexidine 2% Cloths 1 Application(s) Topical <User Schedule>  dextrose 5%. 1000 milliLiter(s) IV Continuous <Continuous>  dextrose 5%. 1000 milliLiter(s) IV Continuous <Continuous>  dextrose 50% Injectable 25 Gram(s) IV Push once  dextrose 50% Injectable 12.5 Gram(s) IV Push once  dextrose 50% Injectable 25 Gram(s) IV Push once  epoetin simon-epbx (RETACRIT) Injectable 26717 Unit(s) SubCutaneous <User Schedule>  glucagon  Injectable 1 milliGRAM(s) IntraMuscular once  heparin   Injectable 5000 Unit(s) SubCutaneous every 12 hours  hydrALAZINE 25 milliGRAM(s) Oral two times a day  hydrochlorothiazide 25 milliGRAM(s) Oral daily  insulin lispro (ADMELOG) corrective regimen sliding scale   SubCutaneous three times a day before meals  insulin lispro (ADMELOG) corrective regimen sliding scale   SubCutaneous at bedtime  naloxone Injectable 0.1 milliGRAM(s) IV Push every 3 minutes PRN  ondansetron Injectable 4 milliGRAM(s) IV Push every 6 hours PRN  oxyCODONE    IR 5 milliGRAM(s) Oral every 6 hours PRN  oxyCODONE    IR 10 milliGRAM(s) Oral every 6 hours PRN  potassium phosphate / sodium phosphate Powder (PHOS-NaK) 1 Packet(s) Oral three times a day with meals  sodium chloride 0.9% lock flush 3 milliLiter(s) IV Push every 8 hours        Allergies:  adhesives (Other)  iodinated radiocontrast agents (Rash; Urticaria)  latex (Blisters; Urticaria)    FAMILY HISTORY:  father had MM      Social history: no tobacco, ETOH, IVDA.   and retired.    PAST MEDICAL & SURGICAL HISTORY:  Calculus of kidney  b/l    DM2 (diabetes mellitus, type 2)  Off Metformin since &#x27;2017. Now diet- managed    CAD (coronary artery disease)  s/p cardiac stent 2014 X 1 ( LOY) @ [facility]    Latex allergy    Cardiac murmur    Hypertension    Hyperlipidemia    Fibroid, uterine  &#x27; 88   surgically treated; benign    Heart murmur  mild    Carotid artery plaque, bilateral  mild    Morbid obesity  BMI  42.4    Malignant neoplasm of rectosigmoid (colon)    COVID-2020    Stented coronary artery    Coronary stent patent  2014   LOY X1    H/O: hysterectomy      MITCHELL and unilateral SO ( benign)    History of   x2    &#x27;75 and &#x27;82    S/P laparoscopic cholecystectomy  &#x27;     H/O lithotripsy  18  Right Ureteroscopy/ Laser Lithotripsy/ Stent Insertion  2018 s/p left PCNL    REVIEW OF SYSTEMS      General: eating a regular diet now.  No fevers, chills, sweats	    Skin/Breast: No rash, itch, bruising  	  Ophthalmologic: No blurry vision  	  ENMT:	No hearing loss, nosebleeds, sore throat    Respiratory and Thorax: No cough, SOB, wheeze, hemoptysis  	  Cardiovascular:	No CP or palpitations    Gastrointestinal:	No N/V/D.  Abd pain is controlled with Percocet.  No BM yet but has gas    Genitourinary:	No dysuria or hematuria    Musculoskeletal:	 No back pain, leg pain, swelling    Neurological:	No HA, dizziness, numbness      Vitals:  Vital Signs Last 24 Hrs  T(C): 37.1 (10 Apr 2021 20:26), Max: 37.1 (10 Apr 2021 17:47)  T(F): 98.8 (10 Apr 2021 20:26), Max: 98.8 (10 Apr 2021 17:47)  HR: 80 (10 Apr 2021 20:26) (67 - 80)  BP: 141/66 (10 Apr 2021 20:26) (141/66 - 181/83)  BP(mean): --  RR: 18 (10 Apr 2021 20:26) (18 - 20)  SpO2: 97% (10 Apr 2021 20:26) (97% - 99%)    Pex:  alert NAD  EOMI anicteric sclera  Neck No LNA  Cv s1 S2 RRR  Lungs clear B/L  abd soft mild tender and distended +BS  No LE edema or tenderness    Labs:                        7.6    9.19  )-----------( 370      ( 10 Apr 2021 07:22 )             23.4     CBC Full  -  ( 10 Apr 2021 07: )  WBC Count : 9.19 K/uL  RBC Count : 2.46 M/uL  Hemoglobin : 7.6 g/dL  Hematocrit : 23.4 %  Platelet Count - Automated : 370 K/uL  Mean Cell Volume : 95.1 fL  Mean Cell Hemoglobin : 30.9 pg  Mean Cell Hemoglobin Concentration : 32.5 gm/dL  Auto Neutrophil # : x  Auto Lymphocyte # : x  Auto Monocyte # : x  Auto Eosinophil # : x  Auto Basophil # : x  Auto Neutrophil % : x  Auto Lymphocyte % : x  Auto Monocyte % : x  Auto Eosinophil % : x  Auto Basophil % : x    04-10    138  |  102  |  6<L>  ----------------------------<  99  3.5   |  26  |  0.84    Ca    8.8      10 Apr 2021 07:31  Phos  2.9     04-10  Mg     1.8     04-10      PT/INR - ( 10 Apr 2021 07:22 )   PT: 12.5 sec;   INR: 1.09 ratio         PTT - ( 10 Apr 2021 07:22 )  PTT:31.9 sec    iron  28   ferritin  532    [id number]

## 2021-04-11 LAB
ANION GAP SERPL CALC-SCNC: 11 MMOL/L — SIGNIFICANT CHANGE UP (ref 7–14)
BUN SERPL-MCNC: 8 MG/DL — SIGNIFICANT CHANGE UP (ref 7–23)
CALCIUM SERPL-MCNC: 9.4 MG/DL — SIGNIFICANT CHANGE UP (ref 8.4–10.5)
CHLORIDE SERPL-SCNC: 100 MMOL/L — SIGNIFICANT CHANGE UP (ref 98–107)
CO2 SERPL-SCNC: 26 MMOL/L — SIGNIFICANT CHANGE UP (ref 22–31)
CREAT SERPL-MCNC: 0.98 MG/DL — SIGNIFICANT CHANGE UP (ref 0.5–1.3)
GLUCOSE BLDC GLUCOMTR-MCNC: 111 MG/DL — HIGH (ref 70–99)
GLUCOSE BLDC GLUCOMTR-MCNC: 113 MG/DL — HIGH (ref 70–99)
GLUCOSE BLDC GLUCOMTR-MCNC: 123 MG/DL — HIGH (ref 70–99)
GLUCOSE BLDC GLUCOMTR-MCNC: 148 MG/DL — HIGH (ref 70–99)
GLUCOSE SERPL-MCNC: 95 MG/DL — SIGNIFICANT CHANGE UP (ref 70–99)
HCT VFR BLD CALC: 27.6 % — LOW (ref 34.5–45)
HGB BLD-MCNC: 9.1 G/DL — LOW (ref 11.5–15.5)
MAGNESIUM SERPL-MCNC: 2 MG/DL — SIGNIFICANT CHANGE UP (ref 1.6–2.6)
MCHC RBC-ENTMCNC: 31.1 PG — SIGNIFICANT CHANGE UP (ref 27–34)
MCHC RBC-ENTMCNC: 33 GM/DL — SIGNIFICANT CHANGE UP (ref 32–36)
MCV RBC AUTO: 94.2 FL — SIGNIFICANT CHANGE UP (ref 80–100)
NRBC # BLD: 0 /100 WBCS — SIGNIFICANT CHANGE UP
NRBC # FLD: 0.03 K/UL — HIGH
PHOSPHATE SERPL-MCNC: 3 MG/DL — SIGNIFICANT CHANGE UP (ref 2.5–4.5)
PLATELET # BLD AUTO: 452 K/UL — HIGH (ref 150–400)
POTASSIUM SERPL-MCNC: 4.2 MMOL/L — SIGNIFICANT CHANGE UP (ref 3.5–5.3)
POTASSIUM SERPL-SCNC: 4.2 MMOL/L — SIGNIFICANT CHANGE UP (ref 3.5–5.3)
RBC # BLD: 2.93 M/UL — LOW (ref 3.8–5.2)
RBC # FLD: 13.3 % — SIGNIFICANT CHANGE UP (ref 10.3–14.5)
SODIUM SERPL-SCNC: 137 MMOL/L — SIGNIFICANT CHANGE UP (ref 135–145)
WBC # BLD: 11.17 K/UL — HIGH (ref 3.8–10.5)
WBC # FLD AUTO: 11.17 K/UL — HIGH (ref 3.8–10.5)

## 2021-04-11 RX ORDER — ACETAMINOPHEN 500 MG
2 TABLET ORAL
Qty: 0 | Refills: 0 | DISCHARGE
Start: 2021-04-11

## 2021-04-11 RX ORDER — OXYCODONE HYDROCHLORIDE 5 MG/1
1 TABLET ORAL
Qty: 16 | Refills: 0
Start: 2021-04-11 | End: 2021-04-14

## 2021-04-11 RX ADMIN — OXYCODONE HYDROCHLORIDE 10 MILLIGRAM(S): 5 TABLET ORAL at 05:55

## 2021-04-11 RX ADMIN — Medication 1 PACKET(S): at 09:05

## 2021-04-11 RX ADMIN — SODIUM CHLORIDE 3 MILLILITER(S): 9 INJECTION INTRAMUSCULAR; INTRAVENOUS; SUBCUTANEOUS at 16:51

## 2021-04-11 RX ADMIN — CARVEDILOL PHOSPHATE 6.25 MILLIGRAM(S): 80 CAPSULE, EXTENDED RELEASE ORAL at 05:25

## 2021-04-11 RX ADMIN — HEPARIN SODIUM 5000 UNIT(S): 5000 INJECTION INTRAVENOUS; SUBCUTANEOUS at 05:26

## 2021-04-11 RX ADMIN — OXYCODONE HYDROCHLORIDE 10 MILLIGRAM(S): 5 TABLET ORAL at 12:01

## 2021-04-11 RX ADMIN — OXYCODONE HYDROCHLORIDE 10 MILLIGRAM(S): 5 TABLET ORAL at 19:19

## 2021-04-11 RX ADMIN — SODIUM CHLORIDE 3 MILLILITER(S): 9 INJECTION INTRAMUSCULAR; INTRAVENOUS; SUBCUTANEOUS at 05:35

## 2021-04-11 RX ADMIN — OXYCODONE HYDROCHLORIDE 10 MILLIGRAM(S): 5 TABLET ORAL at 13:00

## 2021-04-11 RX ADMIN — SODIUM CHLORIDE 3 MILLILITER(S): 9 INJECTION INTRAMUSCULAR; INTRAVENOUS; SUBCUTANEOUS at 21:09

## 2021-04-11 RX ADMIN — HEPARIN SODIUM 5000 UNIT(S): 5000 INJECTION INTRAVENOUS; SUBCUTANEOUS at 17:37

## 2021-04-11 RX ADMIN — Medication 25 MILLIGRAM(S): at 05:26

## 2021-04-11 RX ADMIN — CARVEDILOL PHOSPHATE 6.25 MILLIGRAM(S): 80 CAPSULE, EXTENDED RELEASE ORAL at 17:37

## 2021-04-11 RX ADMIN — Medication 25 MILLIGRAM(S): at 17:37

## 2021-04-11 RX ADMIN — OXYCODONE HYDROCHLORIDE 10 MILLIGRAM(S): 5 TABLET ORAL at 05:25

## 2021-04-11 RX ADMIN — AMLODIPINE BESYLATE 10 MILLIGRAM(S): 2.5 TABLET ORAL at 05:25

## 2021-04-11 NOTE — DISCHARGE NOTE PROVIDER - CARE PROVIDER_API CALL
Daryn Lua  COLON/RECTAL SURGERY  [address] [facility] [address]  [place] [address]  Phone: [phone]  Fax: [phone]  Follow Up Time: 1 week

## 2021-04-11 NOTE — PROGRESS NOTE ADULT - ASSESSMENT
70 year old Jehovah witness, PMHx HTN, HLD, Type 2 DM, CAD, and recent dx of malignant neoplasm of left colon, s/p laparoscopic converted to open left hemicolectomy on 04/05, recovering appropriately on the floors.    PLAN:  - Diabetic LRD  - cont to trend H/H  - Appreciate Heme/Onc recs: continue procrit TIW, limit blood draws  - Incentive spirometry  - Pain control: PO meds  - encourage ambulation, OOB to chair  - VTE prophylaxis with SQH  -Dispo planning    A Team Surgery   [id number]

## 2021-04-11 NOTE — DISCHARGE NOTE PROVIDER - NSDCCPCAREPLAN_GEN_ALL_CORE_FT
PRINCIPAL DISCHARGE DIAGNOSIS  Diagnosis: S/P colon resection  Assessment and Plan of Treatment: You have had a major surgery to resect part of your colon with the mass. For your incision, you may dress wound with gauze and tape for comfort. Some supplies will be provided for you.  Please alternate taking tylenol and motrin for pain. If needed, a prescription for stronger pain medication will be sent to your pharmacy for breakthrough pain.   Please notify Dr. Lua/Robbie or go to the ED if you notice intense or increasing abdominal pain, nausea/vomiting, fever>101F, wound discharge, or have any other questions/concerns.   Please follow up with your surgeon in 1-2 weeks and your PCP.       PRINCIPAL DISCHARGE DIAGNOSIS  Diagnosis: S/P colon resection  Assessment and Plan of Treatment: You have had a major surgery to resect part of your colon with the mass. For your incision, you may dress wound with gauze and tape for comfort. Some supplies will be provided for you.  Please alternate taking tylenol and motrin for pain. If needed, a prescription for stronger pain medication will be sent to your pharmacy for breakthrough pain.   Please notify Dr. Lua/Robbie or go to the ED if you notice intense or increasing abdominal pain, nausea/vomiting, fever>101F, wound discharge, or have any other questions/concerns.   Please follow up with your surgeon in 1-2 weeks and your PCP.  A prescription was sent to your pharmacy for Augmentin x7 days, please take this as prescribed.      SECONDARY DISCHARGE DIAGNOSES  Diagnosis: Anemia  Assessment and Plan of Treatment: Your blood work was closely monitored daily which revealed post operative anemia. Your hematologist was consulted and you received EPO during your admission. On discharge your hemoglobin and hematocrit levels were stable and reviewed with your hematologist. You were instructed to follow up with your Hematologist within 1 week of discharge.

## 2021-04-11 NOTE — PROGRESS NOTE ADULT - SUBJECTIVE AND OBJECTIVE BOX
A Team Surgery Daily Progress Note  =====================================================    SUBJECTIVE: Patient seen and examined at bedside on AM rounds. Patient reports that they're feeling well. Tolerating diet, denies nausea, vomiting.    +Flatus/    +BM. OOB/Ambulating as tolerated. Denies fever, chills.     24 HOUR EVENTS:  No acute events    MEDICATIONS  (STANDING):  amLODIPine   Tablet 10 milliGRAM(s) Oral daily  carvedilol 6.25 milliGRAM(s) Oral every 12 hours  chlorhexidine 2% Cloths 1 Application(s) Topical <User Schedule>  dextrose 5%. 1000 milliLiter(s) (50 mL/Hr) IV Continuous <Continuous>  dextrose 5%. 1000 milliLiter(s) (100 mL/Hr) IV Continuous <Continuous>  dextrose 50% Injectable 25 Gram(s) IV Push once  dextrose 50% Injectable 12.5 Gram(s) IV Push once  dextrose 50% Injectable 25 Gram(s) IV Push once  epoetin simon-epbx (RETACRIT) Injectable 89284 Unit(s) SubCutaneous <User Schedule>  glucagon  Injectable 1 milliGRAM(s) IntraMuscular once  heparin   Injectable 5000 Unit(s) SubCutaneous every 12 hours  hydrALAZINE 25 milliGRAM(s) Oral two times a day  hydrochlorothiazide 25 milliGRAM(s) Oral daily  insulin lispro (ADMELOG) corrective regimen sliding scale   SubCutaneous three times a day before meals  insulin lispro (ADMELOG) corrective regimen sliding scale   SubCutaneous at bedtime  potassium phosphate / sodium phosphate Powder (PHOS-NaK) 1 Packet(s) Oral three times a day with meals  sodium chloride 0.9% lock flush 3 milliLiter(s) IV Push every 8 hours    MEDICATIONS  (PRN):  acetaminophen   Tablet .. 650 milliGRAM(s) Oral every 6 hours PRN Mild Pain (1 - 3)  benzocaine 15 mG/menthol 3.6 mG (Sugar-Free) Lozenge 1 Lozenge Oral every 4 hours PRN Sore Throat  naloxone Injectable 0.1 milliGRAM(s) IV Push every 3 minutes PRN For ANY of the following changes in patient status:  A. RR LESS THAN 10 breaths per minute, B. Oxygen saturation LESS THAN 90%, C. Sedation score of 6  ondansetron Injectable 4 milliGRAM(s) IV Push every 6 hours PRN Nausea  oxyCODONE    IR 5 milliGRAM(s) Oral every 6 hours PRN Moderate Pain (4 - 6)  oxyCODONE    IR 10 milliGRAM(s) Oral every 6 hours PRN Severe Pain (7 - 10)      OBJECTIVE:    Vital Signs Last 24 Hrs  T(C): 36.6 (11 Apr 2021 02:15), Max: 37.1 (10 Apr 2021 17:47)  T(F): 97.9 (11 Apr 2021 02:15), Max: 98.8 (10 Apr 2021 17:47)  HR: 70 (11 Apr 2021 02:15) (70 - 80)  BP: 155/67 (11 Apr 2021 02:15) (141/66 - 163/71)  BP(mean): --  RR: 19 (11 Apr 2021 02:15) (18 - 20)  SpO2: 97% (11 Apr 2021 02:15) (97% - 99%)    PHYSICAL EXAM:  Gen: NAD, resting in bed, alert and responding appropriately  Resp: Non-labored respirations  Abd: Soft, Obese, appropriately tender to palpation. Midline incision with Aquacel dressing c/d/i, post sites covered with opsite dressings c/d/i. GALINA Drain in RLQ with s/s output  Ext: Grossly moving all extremities normally        I&O's Detail    09 Apr 2021 07:01  -  10 Apr 2021 07:00  --------------------------------------------------------  IN:    dextrose 5% + sodium chloride 0.45%: 200 mL    IV PiggyBack: 250 mL    Oral Fluid: 200 mL  Total IN: 650 mL    OUT:    Bulb (mL): 20 mL    Voided (mL): 2200 mL  Total OUT: 2220 mL    Total NET: -1570 mL      10 Apr 2021 07:01  -  11 Apr 2021 02:39  --------------------------------------------------------  IN:    IV PiggyBack: 50 mL  Total IN: 50 mL    OUT:    Bulb (mL): 2.5 mL    Voided (mL): 800 mL  Total OUT: 802.5 mL    Total NET: -752.5 mL          Daily     Daily     LABS:                        7.6    9.19  )-----------( 370      ( 10 Apr 2021 07:22 )             23.4     04-10    138  |  102  |  6<L>  ----------------------------<  99  3.5   |  26  |  0.84    Ca    8.8      10 Apr 2021 07:31  Phos  2.9     04-10  Mg     1.8     04-10      PT/INR - ( 10 Apr 2021 07:22 )   PT: 12.5 sec;   INR: 1.09 ratio         PTT - ( 10 Apr 2021 07:22 )  PTT:31.9 sec

## 2021-04-11 NOTE — DISCHARGE NOTE PROVIDER - HOSPITAL COURSE
70 year old Jehovah witness, PMHx HTN, HLD, Type 2 DM, CAD, and recent dx of malignant neoplasm of left colon, s/p laparoscopic converted to open left hemicolectomy on 04/05. Patient recovered appropriately and was transferred from PACU to floor. Patient had slight downtrending CBC and Heme onc was consulted. Heme onc recommended procrit three times a week and to limit blood draws. Patient's CBC improved. On day of discharge, patient is voiding independently, ambulating, tolerating regular diet, and having return of bowel function. 70 year old Jehovah witness, PMHx HTN, HLD, Type 2 DM, CAD, and recent dx of malignant neoplasm of left colon, s/p laparoscopic converted to open left hemicolectomy on 04/05. Patient recovered appropriately and was transferred from PACU to floor. Patient had slight downtrending CBC and Heme onc was consulted. Heme onc recommended procrit three times a week and to limit blood draws. Patient's CBC improved. On day of discharge, patient is voiding independently, ambulating, tolerating regular diet, and having return of bowel function.

## 2021-04-11 NOTE — PROGRESS NOTE ADULT - ASSESSMENT
Anemia post op.  She is a J witness and she will not consent to blood transfusions even if life saving.  Not iron deficient.  Got EDISON x 1 and Hgb is rising. She is scheduled to get it tomorrow but would only give of Hgb <10.    Mild thrombocytosis today is likely reactive.  Monitor for now.    care per surgical team.

## 2021-04-11 NOTE — DISCHARGE NOTE PROVIDER - NSDCMRMEDTOKEN_GEN_ALL_CORE_FT
acetaminophen 325 mg oral tablet: 2 tab(s) orally every 6 hours, As needed, Mild Pain (1 - 3)  aspirin 81 mg oral tablet: orally once a day  carvedilol 6.25 mg oral tablet: 1 tab(s) orally 2 times a day  Centrum oral tablet: 1 tab(s) orally once a day. last dose 3/30/21  glipiZIDE 5 mg oral tablet, extended release: 1 tab(s) orally once a day  hydrALAZINE 25 mg oral tablet: 1 tab(s) orally 2 times a day  hydroCHLOROthiazide 25 mg oral tablet: 1 tab(s) orally once a day  losartan-hydrochlorothiazide 100 mg-25 mg oral tablet: 1 tab(s) orally once a day  Norvasc 10 mg oral tablet: 1 tab(s) orally once a day   acetaminophen 325 mg oral tablet: 2 tab(s) orally every 6 hours, As needed, Mild Pain (1 - 3)  amoxicillin-clavulanate 875 mg-125 mg oral tablet: 1 tab(s) orally 2 times a day   aspirin 81 mg oral tablet: orally once a day  carvedilol 6.25 mg oral tablet: 1 tab(s) orally 2 times a day  Centrum oral tablet: 1 tab(s) orally once a day. last dose 3/30/21  glipiZIDE 5 mg oral tablet, extended release: 1 tab(s) orally once a day  hydrALAZINE 25 mg oral tablet: 1 tab(s) orally 2 times a day  hydroCHLOROthiazide 25 mg oral tablet: 1 tab(s) orally once a day  losartan-hydrochlorothiazide 100 mg-25 mg oral tablet: 1 tab(s) orally once a day  Norvasc 10 mg oral tablet: 1 tab(s) orally once a day

## 2021-04-11 NOTE — PROGRESS NOTE ADULT - SUBJECTIVE AND OBJECTIVE BOX
Patient is a 70y old  Female who presents with a chief complaint of Left colon mass (11 Apr 2021 02:52)    says that she is feeling okay.  Ate well today.  Abd pain is mild.  No BM but has flatulence.  No N/V.  No fevers or chills.  No CP, SOB, cough.  No HA or dizziness. No dysuria.  No leg pain.  No rash or itch.    Medication:   acetaminophen   Tablet .. 650 milliGRAM(s) Oral every 6 hours PRN  amLODIPine   Tablet 10 milliGRAM(s) Oral daily  benzocaine 15 mG/menthol 3.6 mG (Sugar-Free) Lozenge 1 Lozenge Oral every 4 hours PRN  carvedilol 6.25 milliGRAM(s) Oral every 12 hours  chlorhexidine 2% Cloths 1 Application(s) Topical <User Schedule>  dextrose 5%. 1000 milliLiter(s) IV Continuous <Continuous>  dextrose 5%. 1000 milliLiter(s) IV Continuous <Continuous>  dextrose 50% Injectable 25 Gram(s) IV Push once  dextrose 50% Injectable 12.5 Gram(s) IV Push once  dextrose 50% Injectable 25 Gram(s) IV Push once  epoetin simon-epbx (RETACRIT) Injectable 16125 Unit(s) SubCutaneous <User Schedule>  glucagon  Injectable 1 milliGRAM(s) IntraMuscular once  heparin   Injectable 5000 Unit(s) SubCutaneous every 12 hours  hydrALAZINE 25 milliGRAM(s) Oral two times a day  hydrochlorothiazide 25 milliGRAM(s) Oral daily  insulin lispro (ADMELOG) corrective regimen sliding scale   SubCutaneous three times a day before meals  insulin lispro (ADMELOG) corrective regimen sliding scale   SubCutaneous at bedtime  naloxone Injectable 0.1 milliGRAM(s) IV Push every 3 minutes PRN  ondansetron Injectable 4 milliGRAM(s) IV Push every 6 hours PRN  oxyCODONE    IR 5 milliGRAM(s) Oral every 6 hours PRN  oxyCODONE    IR 10 milliGRAM(s) Oral every 6 hours PRN  sodium chloride 0.9% lock flush 3 milliLiter(s) IV Push every 8 hours      Physical exam    T(C): 36.8 (04-11-21 @ 14:00), Max: 37.2 (04-11-21 @ 05:32)  HR: 76 (04-11-21 @ 14:00) (70 - 80)  BP: 148/68 (04-11-21 @ 14:00) (141/66 - 163/71)  RR: 18 (04-11-21 @ 14:00) (16 - 19)  SpO2: 98% (04-11-21 @ 14:00) (97% - 99%)  Wt(kg): --    alert NAD  EOMI anicteric sclera  Cv s1 S2 RRR  Lungs clear B/L  abd soft mild tender +BS  No LE edema or tenderness    Labs                        9.1    11.17 )-----------( 452      ( 11 Apr 2021 07:30 )             27.6       04-11    137  |  100  |  8   ----------------------------<  95  4.2   |  26  |  0.98    Ca    9.4      11 Apr 2021 07:30  Phos  3.0     04-11  Mg     2.0     04-11            [id number]

## 2021-04-11 NOTE — DISCHARGE NOTE PROVIDER - NSDCFUADDAPPT_GEN_ALL_CORE_FT
Please follow up with your Hematologist Dr. Francis.  Please call the office to schedule an appointment within 1 week of discharge.

## 2021-04-12 LAB
ANION GAP SERPL CALC-SCNC: 12 MMOL/L — SIGNIFICANT CHANGE UP (ref 7–14)
APPEARANCE UR: CLEAR — SIGNIFICANT CHANGE UP
BILIRUB UR-MCNC: NEGATIVE — SIGNIFICANT CHANGE UP
BUN SERPL-MCNC: 14 MG/DL — SIGNIFICANT CHANGE UP (ref 7–23)
CALCIUM SERPL-MCNC: 9.5 MG/DL — SIGNIFICANT CHANGE UP (ref 8.4–10.5)
CHLORIDE SERPL-SCNC: 98 MMOL/L — SIGNIFICANT CHANGE UP (ref 98–107)
CO2 SERPL-SCNC: 27 MMOL/L — SIGNIFICANT CHANGE UP (ref 22–31)
COLOR SPEC: SIGNIFICANT CHANGE UP
CREAT SERPL-MCNC: 1.17 MG/DL — SIGNIFICANT CHANGE UP (ref 0.5–1.3)
DIFF PNL FLD: NEGATIVE — SIGNIFICANT CHANGE UP
GLUCOSE BLDC GLUCOMTR-MCNC: 125 MG/DL — HIGH (ref 70–99)
GLUCOSE BLDC GLUCOMTR-MCNC: 134 MG/DL — HIGH (ref 70–99)
GLUCOSE BLDC GLUCOMTR-MCNC: 137 MG/DL — HIGH (ref 70–99)
GLUCOSE BLDC GLUCOMTR-MCNC: 145 MG/DL — HIGH (ref 70–99)
GLUCOSE SERPL-MCNC: 122 MG/DL — HIGH (ref 70–99)
GLUCOSE UR QL: NEGATIVE — SIGNIFICANT CHANGE UP
HCT VFR BLD CALC: 26 % — LOW (ref 34.5–45)
HGB BLD-MCNC: 8.5 G/DL — LOW (ref 11.5–15.5)
KETONES UR-MCNC: NEGATIVE — SIGNIFICANT CHANGE UP
LEUKOCYTE ESTERASE UR-ACNC: NEGATIVE — SIGNIFICANT CHANGE UP
MAGNESIUM SERPL-MCNC: 1.8 MG/DL — SIGNIFICANT CHANGE UP (ref 1.6–2.6)
MCHC RBC-ENTMCNC: 31.5 PG — SIGNIFICANT CHANGE UP (ref 27–34)
MCHC RBC-ENTMCNC: 32.7 GM/DL — SIGNIFICANT CHANGE UP (ref 32–36)
MCV RBC AUTO: 96.3 FL — SIGNIFICANT CHANGE UP (ref 80–100)
NITRITE UR-MCNC: NEGATIVE — SIGNIFICANT CHANGE UP
NRBC # BLD: 0 /100 WBCS — SIGNIFICANT CHANGE UP
NRBC # FLD: 0.02 K/UL — HIGH
PH UR: 6 — SIGNIFICANT CHANGE UP (ref 5–8)
PHOSPHATE SERPL-MCNC: 3.3 MG/DL — SIGNIFICANT CHANGE UP (ref 2.5–4.5)
PLATELET # BLD AUTO: 464 K/UL — HIGH (ref 150–400)
POTASSIUM SERPL-MCNC: 3.9 MMOL/L — SIGNIFICANT CHANGE UP (ref 3.5–5.3)
POTASSIUM SERPL-SCNC: 3.9 MMOL/L — SIGNIFICANT CHANGE UP (ref 3.5–5.3)
PROT UR-MCNC: NEGATIVE — SIGNIFICANT CHANGE UP
RBC # BLD: 2.7 M/UL — LOW (ref 3.8–5.2)
RBC # FLD: 13.6 % — SIGNIFICANT CHANGE UP (ref 10.3–14.5)
SODIUM SERPL-SCNC: 137 MMOL/L — SIGNIFICANT CHANGE UP (ref 135–145)
SP GR SPEC: 1.01 — SIGNIFICANT CHANGE UP (ref 1.01–1.02)
UROBILINOGEN FLD QL: SIGNIFICANT CHANGE UP
WBC # BLD: 12.51 K/UL — HIGH (ref 3.8–10.5)
WBC # FLD AUTO: 12.51 K/UL — HIGH (ref 3.8–10.5)

## 2021-04-12 PROCEDURE — 71045 X-RAY EXAM CHEST 1 VIEW: CPT | Mod: 26

## 2021-04-12 RX ADMIN — Medication 25 MILLIGRAM(S): at 05:52

## 2021-04-12 RX ADMIN — CARVEDILOL PHOSPHATE 6.25 MILLIGRAM(S): 80 CAPSULE, EXTENDED RELEASE ORAL at 05:52

## 2021-04-12 RX ADMIN — SODIUM CHLORIDE 3 MILLILITER(S): 9 INJECTION INTRAMUSCULAR; INTRAVENOUS; SUBCUTANEOUS at 14:00

## 2021-04-12 RX ADMIN — Medication 25 MILLIGRAM(S): at 17:43

## 2021-04-12 RX ADMIN — HEPARIN SODIUM 5000 UNIT(S): 5000 INJECTION INTRAVENOUS; SUBCUTANEOUS at 17:41

## 2021-04-12 RX ADMIN — CARVEDILOL PHOSPHATE 6.25 MILLIGRAM(S): 80 CAPSULE, EXTENDED RELEASE ORAL at 17:41

## 2021-04-12 RX ADMIN — OXYCODONE HYDROCHLORIDE 10 MILLIGRAM(S): 5 TABLET ORAL at 22:02

## 2021-04-12 RX ADMIN — OXYCODONE HYDROCHLORIDE 10 MILLIGRAM(S): 5 TABLET ORAL at 23:00

## 2021-04-12 RX ADMIN — ERYTHROPOIETIN 10000 UNIT(S): 10000 INJECTION, SOLUTION INTRAVENOUS; SUBCUTANEOUS at 09:44

## 2021-04-12 RX ADMIN — OXYCODONE HYDROCHLORIDE 10 MILLIGRAM(S): 5 TABLET ORAL at 08:40

## 2021-04-12 RX ADMIN — SODIUM CHLORIDE 3 MILLILITER(S): 9 INJECTION INTRAMUSCULAR; INTRAVENOUS; SUBCUTANEOUS at 21:23

## 2021-04-12 RX ADMIN — HEPARIN SODIUM 5000 UNIT(S): 5000 INJECTION INTRAVENOUS; SUBCUTANEOUS at 05:52

## 2021-04-12 RX ADMIN — AMLODIPINE BESYLATE 10 MILLIGRAM(S): 2.5 TABLET ORAL at 05:52

## 2021-04-12 RX ADMIN — SODIUM CHLORIDE 3 MILLILITER(S): 9 INJECTION INTRAMUSCULAR; INTRAVENOUS; SUBCUTANEOUS at 05:52

## 2021-04-12 RX ADMIN — OXYCODONE HYDROCHLORIDE 10 MILLIGRAM(S): 5 TABLET ORAL at 08:04

## 2021-04-12 NOTE — DIETITIAN INITIAL EVALUATION ADULT. - PERTINENT MEDS FT
amLODIPine   Tablet  carvedilol  dextrose 5%.  dextrose 5%.  dextrose 50% Injectable  dextrose 50% Injectable  dextrose 50% Injectable  glucagon  Injectable  hydrALAZINE  hydrochlorothiazide  insulin lispro (ADMELOG) corrective regimen sliding scale  insulin lispro (ADMELOG) corrective regimen sliding scale  sodium chloride 0.9% lock flush

## 2021-04-12 NOTE — DIETITIAN INITIAL EVALUATION ADULT. - OTHER INFO
Met with patient at bedside. Reports good appetite and PO intake, tolerating low fiber diet well. 75% po intake in nursing flowsheet on 4/10 recorded. Patient denies any nausea/vomiting/diarrhea/constipation or difficulty chewing and swallowing. NKMIKEY. KAPIL reviewed Low Fiber Diet Nutrition Therapy with patient and written instructions provided. All nutrition-related questions addressed to patient satisfaction.

## 2021-04-12 NOTE — PROGRESS NOTE ADULT - SUBJECTIVE AND OBJECTIVE BOX
SURGERY PROGRESS NOTE    SUBJECTIVE / 24H EVENTS:  Patient seen and examined on morning rounds. No acute events overnight. GI function (+-)      OBJECTIVE:  VITAL SIGNS:  T(C): 37 (04-11-21 @ 21:27), Max: 37.2 (04-11-21 @ 05:32)  HR: 75 (04-11-21 @ 21:27) (70 - 80)  BP: 136/71 (04-11-21 @ 21:27) (136/71 - 155/67)  RR: 17 (04-11-21 @ 21:27) (16 - 19)  SpO2: 95% (04-11-21 @ 21:27) (95% - 99%)  Daily     Daily   POCT Blood Glucose.: 148 mg/dL (04-11-21 @ 21:16)  POCT Blood Glucose.: 123 mg/dL (04-11-21 @ 17:13)  POCT Blood Glucose.: 113 mg/dL (04-11-21 @ 11:50)      PHYSICAL EXAM:  Gen: NAD, resting in bed, alert and responding appropriately  Resp: Non-labored respirations  Abd: Soft, Obese, appropriately tender to palpation. Midline incision with Aquacel dressing c/d/i, post sites covered with opsite dressings c/d/i. GALINA Drain in RLQ with s/s output  Ext: Grossly moving all extremities normally      04-10-21 @ 07:01  -  04-11-21 @ 07:00  --------------------------------------------------------  IN:    IV PiggyBack: 50 mL  Total IN: 50 mL    OUT:    Bulb (mL): 3 mL    Voided (mL): 1200 mL  Total OUT: 1203 mL    Total NET: -1153 mL      04-11-21 @ 07:01  -  04-12-21 @ 01:01  --------------------------------------------------------  IN:    Oral Fluid: 580 mL  Total IN: 580 mL    OUT:    Bulb (mL): 5 mL  Total OUT: 5 mL    Total NET: 575 mL          LAB VALUES:  04-11    137  |  100  |  8   ----------------------------<  95  4.2   |  26  |  0.98    Ca    9.4      11 Apr 2021 07:30  Phos  3.0     04-11  Mg     2.0     04-11                                 9.1    11.17 )-----------( 452      ( 11 Apr 2021 07:30 )             27.6       PT/INR - ( 10 Apr 2021 07:22 )   PT: 12.5 sec;   INR: 1.09 ratio         PTT - ( 10 Apr 2021 07:22 )  PTT:31.9 sec            MICROBIOLOGY:      RADIOLOGY:        MEDICATIONS  (STANDING):  amLODIPine   Tablet 10 milliGRAM(s) Oral daily  carvedilol 6.25 milliGRAM(s) Oral every 12 hours  chlorhexidine 2% Cloths 1 Application(s) Topical <User Schedule>  dextrose 5%. 1000 milliLiter(s) (50 mL/Hr) IV Continuous <Continuous>  dextrose 5%. 1000 milliLiter(s) (100 mL/Hr) IV Continuous <Continuous>  dextrose 50% Injectable 25 Gram(s) IV Push once  dextrose 50% Injectable 12.5 Gram(s) IV Push once  dextrose 50% Injectable 25 Gram(s) IV Push once  epoetin simon-epbx (RETACRIT) Injectable 98472 Unit(s) SubCutaneous <User Schedule>  glucagon  Injectable 1 milliGRAM(s) IntraMuscular once  heparin   Injectable 5000 Unit(s) SubCutaneous every 12 hours  hydrALAZINE 25 milliGRAM(s) Oral two times a day  hydrochlorothiazide 25 milliGRAM(s) Oral daily  insulin lispro (ADMELOG) corrective regimen sliding scale   SubCutaneous three times a day before meals  insulin lispro (ADMELOG) corrective regimen sliding scale   SubCutaneous at bedtime  sodium chloride 0.9% lock flush 3 milliLiter(s) IV Push every 8 hours    MEDICATIONS  (PRN):  acetaminophen   Tablet .. 650 milliGRAM(s) Oral every 6 hours PRN Mild Pain (1 - 3)  benzocaine 15 mG/menthol 3.6 mG (Sugar-Free) Lozenge 1 Lozenge Oral every 4 hours PRN Sore Throat  naloxone Injectable 0.1 milliGRAM(s) IV Push every 3 minutes PRN For ANY of the following changes in patient status:  A. RR LESS THAN 10 breaths per minute, B. Oxygen saturation LESS THAN 90%, C. Sedation score of 6  ondansetron Injectable 4 milliGRAM(s) IV Push every 6 hours PRN Nausea  oxyCODONE    IR 5 milliGRAM(s) Oral every 6 hours PRN Moderate Pain (4 - 6)  oxyCODONE    IR 10 milliGRAM(s) Oral every 6 hours PRN Severe Pain (7 - 10)        SURGERY PROGRESS NOTE    SUBJECTIVE / 24H EVENTS:  Patient seen and examined on morning rounds. No acute events overnight. GI function (+-). Reports pain as controlled, OOB/ambulating, tolerating regular diet.      OBJECTIVE:  VITAL SIGNS:  ICU Vital Signs Last 24 Hrs  T(C): 36.6 (12 Apr 2021 05:50), Max: 37 (11 Apr 2021 21:27)  T(F): 97.9 (12 Apr 2021 05:50), Max: 98.6 (11 Apr 2021 21:27)  HR: 76 (12 Apr 2021 05:50) (70 - 80)  BP: 144/60 (12 Apr 2021 05:50) (136/71 - 148/68)  BP(mean): --  ABP: --  ABP(mean): --  RR: 16 (12 Apr 2021 05:50) (16 - 18)  SpO2: 97% (12 Apr 2021 05:50) (95% - 100%)    PHYSICAL EXAM:  Gen: NAD, resting in bed, alert and responding appropriately  Resp: Non-labored respirations, symmetric cw expansion  Abd: Soft, Obese, appropriately tender to palpation. Midline incision c/d/i, post sites c/d/i. GALINA Drain in RLQ with minimal s/s output  Ext: Grossly moving all extremities normally    I&O's Detail    11 Apr 2021 07:01  -  12 Apr 2021 07:00  --------------------------------------------------------  IN:    Oral Fluid: 900 mL  Total IN: 900 mL    OUT:    Bulb (mL): 6 mL    Voided (mL): 250 mL  Total OUT: 256 mL    Total NET: 644 mL          LAB VALUES:      MEDICATIONS  (STANDING):  amLODIPine   Tablet 10 milliGRAM(s) Oral daily  carvedilol 6.25 milliGRAM(s) Oral every 12 hours  chlorhexidine 2% Cloths 1 Application(s) Topical <User Schedule>  dextrose 5%. 1000 milliLiter(s) (50 mL/Hr) IV Continuous <Continuous>  dextrose 5%. 1000 milliLiter(s) (100 mL/Hr) IV Continuous <Continuous>  dextrose 50% Injectable 25 Gram(s) IV Push once  dextrose 50% Injectable 25 Gram(s) IV Push once  dextrose 50% Injectable 12.5 Gram(s) IV Push once  epoetin simon-epbx (RETACRIT) Injectable 03895 Unit(s) SubCutaneous <User Schedule>  glucagon  Injectable 1 milliGRAM(s) IntraMuscular once  heparin   Injectable 5000 Unit(s) SubCutaneous every 12 hours  hydrALAZINE 25 milliGRAM(s) Oral two times a day  hydrochlorothiazide 25 milliGRAM(s) Oral daily  insulin lispro (ADMELOG) corrective regimen sliding scale   SubCutaneous three times a day before meals  insulin lispro (ADMELOG) corrective regimen sliding scale   SubCutaneous at bedtime  sodium chloride 0.9% lock flush 3 milliLiter(s) IV Push every 8 hours    MEDICATIONS  (PRN):  acetaminophen   Tablet .. 650 milliGRAM(s) Oral every 6 hours PRN Mild Pain (1 - 3)  benzocaine 15 mG/menthol 3.6 mG (Sugar-Free) Lozenge 1 Lozenge Oral every 4 hours PRN Sore Throat  naloxone Injectable 0.1 milliGRAM(s) IV Push every 3 minutes PRN For ANY of the following changes in patient status:  A. RR LESS THAN 10 breaths per minute, B. Oxygen saturation LESS THAN 90%, C. Sedation score of 6  ondansetron Injectable 4 milliGRAM(s) IV Push every 6 hours PRN Nausea  oxyCODONE    IR 5 milliGRAM(s) Oral every 6 hours PRN Moderate Pain (4 - 6)  oxyCODONE    IR 10 milliGRAM(s) Oral every 6 hours PRN Severe Pain (7 - 10)

## 2021-04-12 NOTE — PROGRESS NOTE ADULT - ASSESSMENT
patient with colon cancer noted on a polypectomy.  Now s/p hemicolectomy.  Pathology pending and await results for oncology recommendations.    Anemia post op - J witness and no transfusions.  Getting EDISON and Hgb lower than yesterday but higher than last week.  Dose TIW for now.    thrombocytosis likely reactive - monitor.    care per surgical team.

## 2021-04-12 NOTE — DIETITIAN INITIAL EVALUATION ADULT. - ORAL INTAKE PTA/DIET HISTORY
Patient reports adhering to DM diet PTA avoiding addition of sugar. Reports good appetite and PO intake.

## 2021-04-12 NOTE — DIETITIAN INITIAL EVALUATION ADULT. - CHIEF COMPLAINT
70 year old Jehovah witness, PMHx HTN, HLD, Type 2 DM, CAD, and recent dx of malignant neoplasm of left colon, s/p laparoscopic converted to open left hemicolectomy on 04/05 per chart review.

## 2021-04-12 NOTE — PROGRESS NOTE ADULT - ASSESSMENT
70 year old Jehovah witness, PMHx HTN, HLD, Type 2 DM, CAD, and recent dx of malignant neoplasm of left colon, s/p laparoscopic converted to open left hemicolectomy on 04/05, recovering appropriately on the floors.    PLAN:  - Diabetic LRD  - cont to trend H/H  - Appreciate Heme/Onc recs: continue procrit TIW, limit blood draws  - Incentive spirometry  - Pain control: PO meds  - encourage ambulation, OOB to chair  - VTE prophylaxis with SQH  - Dispo planning    A Team Surgery   [id number]

## 2021-04-12 NOTE — PROGRESS NOTE ADULT - SUBJECTIVE AND OBJECTIVE BOX
Patient is a 70y old  Female who presents with a chief complaint of Left colon mass (12 Apr 2021 12:52)    has some abdominal pain this morning.  Got percocet.  Still no BM but has flatulence.  No N/V.  No fevers or chills.  No CP, SOB, cough.  No HA or dizziness.  No dysuria or hematuria.  No leg pain, or swelling    Medication:   acetaminophen   Tablet .. 650 milliGRAM(s) Oral every 6 hours PRN  amLODIPine   Tablet 10 milliGRAM(s) Oral daily  benzocaine 15 mG/menthol 3.6 mG (Sugar-Free) Lozenge 1 Lozenge Oral every 4 hours PRN  carvedilol 6.25 milliGRAM(s) Oral every 12 hours  chlorhexidine 2% Cloths 1 Application(s) Topical <User Schedule>  dextrose 5%. 1000 milliLiter(s) IV Continuous <Continuous>  dextrose 5%. 1000 milliLiter(s) IV Continuous <Continuous>  dextrose 50% Injectable 25 Gram(s) IV Push once  dextrose 50% Injectable 12.5 Gram(s) IV Push once  dextrose 50% Injectable 25 Gram(s) IV Push once  epoetin simon-epbx (RETACRIT) Injectable 58466 Unit(s) SubCutaneous <User Schedule>  glucagon  Injectable 1 milliGRAM(s) IntraMuscular once  heparin   Injectable 5000 Unit(s) SubCutaneous every 12 hours  hydrALAZINE 25 milliGRAM(s) Oral two times a day  hydrochlorothiazide 25 milliGRAM(s) Oral daily  insulin lispro (ADMELOG) corrective regimen sliding scale   SubCutaneous three times a day before meals  insulin lispro (ADMELOG) corrective regimen sliding scale   SubCutaneous at bedtime  naloxone Injectable 0.1 milliGRAM(s) IV Push every 3 minutes PRN  ondansetron Injectable 4 milliGRAM(s) IV Push every 6 hours PRN  oxyCODONE    IR 5 milliGRAM(s) Oral every 6 hours PRN  oxyCODONE    IR 10 milliGRAM(s) Oral every 6 hours PRN  sodium chloride 0.9% lock flush 3 milliLiter(s) IV Push every 8 hours      Physical exam    T(C): 36.9 (04-12-21 @ 17:38), Max: 37.1 (04-12-21 @ 10:43)  HR: 79 (04-12-21 @ 17:38) (72 - 79)  BP: 141/64 (04-12-21 @ 17:38) (133/68 - 144/60)  RR: 16 (04-12-21 @ 17:38) (16 - 18)  SpO2: 99% (04-12-21 @ 17:38) (95% - 100%)  Wt(kg): --    alert NAD  EOMI anicteric sclera  Cv s1 S2 RRR  Lungs clear B/L  abd soft NT ND +BS  No LE edema or tenderness    Labs                        8.5    12.51 )-----------( 464      ( 12 Apr 2021 07:37 )             26.0       04-12    137  |  98  |  14  ----------------------------<  122<H>  3.9   |  27  |  1.17    Ca    9.5      12 Apr 2021 07:37  Phos  3.3     04-12  Mg     1.8     04-12            [id number]

## 2021-04-13 ENCOUNTER — TRANSCRIPTION ENCOUNTER (OUTPATIENT)
Age: 71
End: 2021-04-13

## 2021-04-13 VITALS
DIASTOLIC BLOOD PRESSURE: 59 MMHG | SYSTOLIC BLOOD PRESSURE: 127 MMHG | OXYGEN SATURATION: 99 % | TEMPERATURE: 99 F | RESPIRATION RATE: 18 BRPM | HEART RATE: 75 BPM

## 2021-04-13 LAB
GLUCOSE BLDC GLUCOMTR-MCNC: 135 MG/DL — HIGH (ref 70–99)
GLUCOSE BLDC GLUCOMTR-MCNC: 136 MG/DL — HIGH (ref 70–99)
HCT VFR BLD CALC: 25.1 % — LOW (ref 34.5–45)
HGB BLD-MCNC: 8.1 G/DL — LOW (ref 11.5–15.5)
MCHC RBC-ENTMCNC: 30.7 PG — SIGNIFICANT CHANGE UP (ref 27–34)
MCHC RBC-ENTMCNC: 32.3 GM/DL — SIGNIFICANT CHANGE UP (ref 32–36)
MCV RBC AUTO: 95.1 FL — SIGNIFICANT CHANGE UP (ref 80–100)
NRBC # BLD: 0 /100 WBCS — SIGNIFICANT CHANGE UP
NRBC # FLD: 0.03 K/UL — HIGH
PLATELET # BLD AUTO: 481 K/UL — HIGH (ref 150–400)
RBC # BLD: 2.64 M/UL — LOW (ref 3.8–5.2)
RBC # FLD: 13.8 % — SIGNIFICANT CHANGE UP (ref 10.3–14.5)
SURGICAL PATHOLOGY STUDY: SIGNIFICANT CHANGE UP
WBC # BLD: 13.15 K/UL — HIGH (ref 3.8–10.5)
WBC # FLD AUTO: 13.15 K/UL — HIGH (ref 3.8–10.5)

## 2021-04-13 RX ORDER — PIPERACILLIN AND TAZOBACTAM 4; .5 G/20ML; G/20ML
3.38 INJECTION, POWDER, LYOPHILIZED, FOR SOLUTION INTRAVENOUS ONCE
Refills: 0 | Status: COMPLETED | OUTPATIENT
Start: 2021-04-13 | End: 2021-04-13

## 2021-04-13 RX ORDER — OXYCODONE HYDROCHLORIDE 5 MG/1
1 TABLET ORAL
Qty: 5 | Refills: 0
Start: 2021-04-13

## 2021-04-13 RX ADMIN — HEPARIN SODIUM 5000 UNIT(S): 5000 INJECTION INTRAVENOUS; SUBCUTANEOUS at 05:16

## 2021-04-13 RX ADMIN — AMLODIPINE BESYLATE 10 MILLIGRAM(S): 2.5 TABLET ORAL at 05:16

## 2021-04-13 RX ADMIN — OXYCODONE HYDROCHLORIDE 5 MILLIGRAM(S): 5 TABLET ORAL at 13:22

## 2021-04-13 RX ADMIN — Medication 25 MILLIGRAM(S): at 05:16

## 2021-04-13 RX ADMIN — CARVEDILOL PHOSPHATE 6.25 MILLIGRAM(S): 80 CAPSULE, EXTENDED RELEASE ORAL at 05:16

## 2021-04-13 RX ADMIN — SODIUM CHLORIDE 3 MILLILITER(S): 9 INJECTION INTRAMUSCULAR; INTRAVENOUS; SUBCUTANEOUS at 12:45

## 2021-04-13 RX ADMIN — OXYCODONE HYDROCHLORIDE 5 MILLIGRAM(S): 5 TABLET ORAL at 12:22

## 2021-04-13 RX ADMIN — PIPERACILLIN AND TAZOBACTAM 200 GRAM(S): 4; .5 INJECTION, POWDER, LYOPHILIZED, FOR SOLUTION INTRAVENOUS at 12:08

## 2021-04-13 RX ADMIN — SODIUM CHLORIDE 3 MILLILITER(S): 9 INJECTION INTRAMUSCULAR; INTRAVENOUS; SUBCUTANEOUS at 05:15

## 2021-04-13 NOTE — PROGRESS NOTE ADULT - ASSESSMENT
Patient seen and examined with Dr. Avalos.    70 year old Jehovah witness, PMHx HTN, HLD, Type 2 DM, CAD, and recent dx of malignant neoplasm of left colon, s/p laparoscopic converted to open left hemicolectomy, POD#8, recovering appropriately on the floors    PLAN:    - Diet: LRD  - OOBA, Incentive spirometry  - Pain control: PO pain meds PRN  - VTE prophylaxis with Heparin subcutaneous  - cont to monitor H/H trends    A Team Surgery [id number]  Patient seen and examined with Dr. Avalos.    70 year old Jehovah witness, PMHx HTN, HLD, Type 2 DM, CAD, and recent dx of malignant neoplasm of left colon, s/p laparoscopic converted to open left hemicolectomy, POD#8, recovering appropriately on the floors    PLAN:    - Diet: LRD  - OOBA, Incentive spirometry  - Pain control: PO pain meds PRN  - VTE prophylaxis with Heparin subcutaneous  - cont to monitor H/H trends  - dispo planning home  - f/u w/ Dr. Lua in 7-10 days     A Team Surgery [id number]

## 2021-04-13 NOTE — PROGRESS NOTE ADULT - SUBJECTIVE AND OBJECTIVE BOX
SURGERY PROGRESS NOTE    SUBJECTIVE / 24H EVENTS:  Patient seen and examined on morning rounds. No acute events overnight. WBC uptrending, UA and CXR yesterday negative      OBJECTIVE:  VITAL SIGNS:  T(C): 36.8 (21 @ 21:43), Max: 37.1 (21 @ 10:43)  HR: 68 (21 @ 21:43) (68 - 79)  BP: 146/58 (21 @ 21:43) (133/68 - 146/58)  RR: 16 (21 @ 21:43) (16 - 18)  SpO2: 98% (21 @ 21:43) (97% - 100%)  Daily     Daily   POCT Blood Glucose.: 145 mg/dL (21 @ 22:04)  POCT Blood Glucose.: 134 mg/dL (21 @ 17:19)      PHYSICAL EXAM:  Gen: NAD, resting in bed, alert and responding appropriately  Resp: Non-labored respirations, symmetric cw expansion  Abd: Soft, Obese, appropriately tender to palpation. Midline incision c/d/i, post sites c/d/i. GALINA Drain in RLQ with minimal s/s output  Ext: Grossly moving all extremities normally      21 @ 07:  -  21 @ 07:00  --------------------------------------------------------  IN:    Oral Fluid: 900 mL  Total IN: 900 mL    OUT:    Bulb (mL): 6 mL    Voided (mL): 250 mL  Total OUT: 256 mL    Total NET: 644 mL      21 @ 07:  -  21 @ 00:50  --------------------------------------------------------  IN:    Oral Fluid: 640 mL  Total IN: 640 mL    OUT:    Bulb (mL): 0 mL    Voided (mL): 950 mL  Total OUT: 950 mL    Total NET: -310 mL          LAB VALUES:      137  |  98  |  14  ----------------------------<  122<H>  3.9   |  27  |  1.17    Ca    9.5      2021 07:37  Phos  3.3     04-12  Mg     1.8     04-12                                 8.5    12.51 )-----------( 464      ( 2021 07:37 )             26.0               Urinalysis Basic - ( 2021 13:24 )    Color: Light Yellow / Appearance: Clear / S.013 / pH: x  Gluc: x / Ketone: Negative  / Bili: Negative / Urobili: <2 mg/dL   Blood: x / Protein: Negative / Nitrite: Negative   Leuk Esterase: Negative / RBC: x / WBC x   Sq Epi: x / Non Sq Epi: x / Bacteria: x        MICROBIOLOGY:      RADIOLOGY:  PACS Image: Image(s) Available (21 @ 10:23)        MEDICATIONS  (STANDING):  amLODIPine   Tablet 10 milliGRAM(s) Oral daily  carvedilol 6.25 milliGRAM(s) Oral every 12 hours  chlorhexidine 2% Cloths 1 Application(s) Topical <User Schedule>  dextrose 5%. 1000 milliLiter(s) (50 mL/Hr) IV Continuous <Continuous>  dextrose 5%. 1000 milliLiter(s) (100 mL/Hr) IV Continuous <Continuous>  dextrose 50% Injectable 25 Gram(s) IV Push once  dextrose 50% Injectable 12.5 Gram(s) IV Push once  dextrose 50% Injectable 25 Gram(s) IV Push once  epoetin simon-epbx (RETACRIT) Injectable 20880 Unit(s) SubCutaneous <User Schedule>  glucagon  Injectable 1 milliGRAM(s) IntraMuscular once  heparin   Injectable 5000 Unit(s) SubCutaneous every 12 hours  hydrALAZINE 25 milliGRAM(s) Oral two times a day  hydrochlorothiazide 25 milliGRAM(s) Oral daily  insulin lispro (ADMELOG) corrective regimen sliding scale   SubCutaneous three times a day before meals  insulin lispro (ADMELOG) corrective regimen sliding scale   SubCutaneous at bedtime  sodium chloride 0.9% lock flush 3 milliLiter(s) IV Push every 8 hours    MEDICATIONS  (PRN):  acetaminophen   Tablet .. 650 milliGRAM(s) Oral every 6 hours PRN Mild Pain (1 - 3)  benzocaine 15 mG/menthol 3.6 mG (Sugar-Free) Lozenge 1 Lozenge Oral every 4 hours PRN Sore Throat  naloxone Injectable 0.1 milliGRAM(s) IV Push every 3 minutes PRN For ANY of the following changes in patient status:  A. RR LESS THAN 10 breaths per minute, B. Oxygen saturation LESS THAN 90%, C. Sedation score of 6  ondansetron Injectable 4 milliGRAM(s) IV Push every 6 hours PRN Nausea  oxyCODONE    IR 5 milliGRAM(s) Oral every 6 hours PRN Moderate Pain (4 - 6)  oxyCODONE    IR 10 milliGRAM(s) Oral every 6 hours PRN Severe Pain (7 - 10)        SURGERY PROGRESS NOTE    SUBJECTIVE / 24H EVENTS:  Patient seen and examined on morning rounds. No acute events overnight. WBC uptrending, UA and CXR yesterday negative. Patient reports that she's feeling well, reports pain as controlled. OOB/Ambulating.       OBJECTIVE:  VITAL SIGNS:  T(C): 36.8 (21 @ 21:43), Max: 37.1 (21 @ 10:43)  HR: 68 (21 @ 21:43) (68 - 79)  BP: 146/58 (21 @ 21:43) (133/68 - 146/58)  RR: 16 (21 @ 21:43) (16 - 18)  SpO2: 98% (21 @ 21:43) (97% - 100%)  Daily     Daily   POCT Blood Glucose.: 145 mg/dL (21 @ 22:04)  POCT Blood Glucose.: 134 mg/dL (21 @ 17:19)      PHYSICAL EXAM:  Gen: NAD, resting in bed, alert and responding appropriately  Resp: Non-labored respirations, symmetric cw expansion  Abd: Soft, Obese, appropriately tender to palpation. Midline incision c/d/i, port sites c/d/i. GALINA Drain in RLQ with minimal s/s output  Ext: Grossly moving all extremities normally, warm and well perfused.      21 @ 07:  -  21 @ 07:00  --------------------------------------------------------  IN:    Oral Fluid: 900 mL  Total IN: 900 mL    OUT:    Bulb (mL): 6 mL    Voided (mL): 250 mL  Total OUT: 256 mL    Total NET: 644 mL      21 @ 07:  -  21 @ 00:50  --------------------------------------------------------  IN:    Oral Fluid: 640 mL  Total IN: 640 mL    OUT:    Bulb (mL): 0 mL    Voided (mL): 950 mL  Total OUT: 950 mL    Total NET: -310 mL          LAB VALUES:                        8.5    12.51 )-----------( 464      ( 2021 07:37 )             26.0   04-12    137  |  98  |  14  ----------------------------<  122<H>  3.9   |  27  |  1.17    Ca    9.5      2021 07:37  Phos  3.3       Mg     1.8     12        Urinalysis Basic - ( 2021 13:24 )    Color: Light Yellow / Appearance: Clear / S.013 / pH: x  Gluc: x / Ketone: Negative  / Bili: Negative / Urobili: <2 mg/dL   Blood: x / Protein: Negative / Nitrite: Negative   Leuk Esterase: Negative / RBC: x / WBC x   Sq Epi: x / Non Sq Epi: x / Bacteria: x        MICROBIOLOGY:      RADIOLOGY:  PACS Image: Image(s) Available (21 @ 10:23)        MEDICATIONS  (STANDING):  amLODIPine   Tablet 10 milliGRAM(s) Oral daily  carvedilol 6.25 milliGRAM(s) Oral every 12 hours  chlorhexidine 2% Cloths 1 Application(s) Topical <User Schedule>  dextrose 5%. 1000 milliLiter(s) (50 mL/Hr) IV Continuous <Continuous>  dextrose 5%. 1000 milliLiter(s) (100 mL/Hr) IV Continuous <Continuous>  dextrose 50% Injectable 25 Gram(s) IV Push once  dextrose 50% Injectable 12.5 Gram(s) IV Push once  dextrose 50% Injectable 25 Gram(s) IV Push once  epoetin simon-epbx (RETACRIT) Injectable 85530 Unit(s) SubCutaneous <User Schedule>  glucagon  Injectable 1 milliGRAM(s) IntraMuscular once  heparin   Injectable 5000 Unit(s) SubCutaneous every 12 hours  hydrALAZINE 25 milliGRAM(s) Oral two times a day  hydrochlorothiazide 25 milliGRAM(s) Oral daily  insulin lispro (ADMELOG) corrective regimen sliding scale   SubCutaneous three times a day before meals  insulin lispro (ADMELOG) corrective regimen sliding scale   SubCutaneous at bedtime  sodium chloride 0.9% lock flush 3 milliLiter(s) IV Push every 8 hours    MEDICATIONS  (PRN):  acetaminophen   Tablet .. 650 milliGRAM(s) Oral every 6 hours PRN Mild Pain (1 - 3)  benzocaine 15 mG/menthol 3.6 mG (Sugar-Free) Lozenge 1 Lozenge Oral every 4 hours PRN Sore Throat  naloxone Injectable 0.1 milliGRAM(s) IV Push every 3 minutes PRN For ANY of the following changes in patient status:  A. RR LESS THAN 10 breaths per minute, B. Oxygen saturation LESS THAN 90%, C. Sedation score of 6  ondansetron Injectable 4 milliGRAM(s) IV Push every 6 hours PRN Nausea  oxyCODONE    IR 5 milliGRAM(s) Oral every 6 hours PRN Moderate Pain (4 - 6)  oxyCODONE    IR 10 milliGRAM(s) Oral every 6 hours PRN Severe Pain (7 - 10)

## 2021-04-13 NOTE — DISCHARGE NOTE NURSING/CASE MANAGEMENT/SOCIAL WORK - PATIENT PORTAL LINK FT
You can access the FollowMyHealth Patient Portal offered by [facility] by registering at the following website: [email or web address]. By joining [facility]’s FollowMyHealth portal, you will also be able to view your health information using other applications (apps) compatible with our system.

## 2021-04-13 NOTE — PROGRESS NOTE ADULT - ATTENDING COMMENTS
Agree with above.
Pt seen/examined, agree with above. Slowly resolving PO ileus    - clears  - ambulate  - PO pain meds  - f/u AM labs
Patient seen and examined  Doing well  No nausea or vomiting  Passing flatus    Awake, alert  Breathing comfortably  Abd is soft, not distended, appropriately tender  Drain is serosanguinous    - advance diet as tolerated  - ambulate as tolerated
pt seen and examined  agree with above  tolerating diet, + GI fxn  soft, nt, nd   wound c/d/i  dc planning for am  d/w pt @ bedside in detail  Dr Lua to see pt in am
Pt seen/examined, agree with above.    - poss d/c home later, pending lab results
Pt seen/examined, agree with above. Clinically looking great. Tolerating diet, positive bowel function. Afebrile despite mild increase in WBC    - check U/A  - cont monitor WBC
Pt seen/examined, agree with above. Mild drop in HH, however hemodynamically stable.    - Monitor HH   - keep Gomez to monitor urine output after fluid bolus  - PT eval  - OOB to chair, ambulate with assistance

## 2021-04-13 NOTE — PROGRESS NOTE ADULT - PROVIDER SPECIALTY LIST ADULT
Heme/Onc
Pain Medicine
Surgery
Heme/Onc
Surgery
Pain Medicine
Surgery

## 2021-04-13 NOTE — PROGRESS NOTE ADULT - SUBJECTIVE AND OBJECTIVE BOX
DATE OF SERVICE: 21 @ 08:29    INTERVAL HPI/OVERNIGHT EVENTS: Patient seen and examined, resting comfortably at bedside awake and alert. Patient c/o moderate to severe gas pains in the mornings. Patient reports tolerating diet without nausea or vomiting. Admits to passing little flatus but denies having bowel movements. Patient endorses voiding without issues and has been out of bed and ambulating. Denies fever, chills, SOB, or chest pain. Pain well controlled.    STATUS POST:  left hemicolectomy    POST OPERATIVE DAY #:  8    MEDICATIONS  (STANDING):  amLODIPine   Tablet 10 milliGRAM(s) Oral daily  carvedilol 6.25 milliGRAM(s) Oral every 12 hours  chlorhexidine 2% Cloths 1 Application(s) Topical <User Schedule>  dextrose 5%. 1000 milliLiter(s) (50 mL/Hr) IV Continuous <Continuous>  dextrose 5%. 1000 milliLiter(s) (100 mL/Hr) IV Continuous <Continuous>  dextrose 50% Injectable 25 Gram(s) IV Push once  dextrose 50% Injectable 12.5 Gram(s) IV Push once  dextrose 50% Injectable 25 Gram(s) IV Push once  epoetin simon-epbx (RETACRIT) Injectable 73330 Unit(s) SubCutaneous <User Schedule>  glucagon  Injectable 1 milliGRAM(s) IntraMuscular once  heparin   Injectable 5000 Unit(s) SubCutaneous every 12 hours  hydrALAZINE 25 milliGRAM(s) Oral two times a day  hydrochlorothiazide 25 milliGRAM(s) Oral daily  insulin lispro (ADMELOG) corrective regimen sliding scale   SubCutaneous three times a day before meals  insulin lispro (ADMELOG) corrective regimen sliding scale   SubCutaneous at bedtime  sodium chloride 0.9% lock flush 3 milliLiter(s) IV Push every 8 hours    MEDICATIONS  (PRN):  acetaminophen   Tablet .. 650 milliGRAM(s) Oral every 6 hours PRN Mild Pain (1 - 3)  benzocaine 15 mG/menthol 3.6 mG (Sugar-Free) Lozenge 1 Lozenge Oral every 4 hours PRN Sore Throat  naloxone Injectable 0.1 milliGRAM(s) IV Push every 3 minutes PRN For ANY of the following changes in patient status:  A. RR LESS THAN 10 breaths per minute, B. Oxygen saturation LESS THAN 90%, C. Sedation score of 6  ondansetron Injectable 4 milliGRAM(s) IV Push every 6 hours PRN Nausea  oxyCODONE    IR 10 milliGRAM(s) Oral every 6 hours PRN Severe Pain (7 - 10)  oxyCODONE    IR 5 milliGRAM(s) Oral every 6 hours PRN Moderate Pain (4 - 6)      Vital Signs Last 24 Hrs  T(C): 36.8 (2021 05:14), Max: 37.1 (2021 10:43)  T(F): 98.2 (2021 05:14), Max: 98.8 (2021 10:43)  HR: 76 (2021 05:14) (68 - 79)  BP: 154/66 (2021 05:14) (133/68 - 170/67)  BP(mean): --  RR: 18 (2021 05:14) (16 - 18)  SpO2: 95% (2021 05:14) (95% - 100%)  I&O's Detail    2021 07:01  -  2021 07:00  --------------------------------------------------------  IN:    Oral Fluid: 1120 mL  Total IN: 1120 mL    OUT:    Bulb (mL): 2.5 mL    Voided (mL): 1550 mL  Total OUT: 1552.5 mL    Total NET: -432.5 mL          REVIEW OF SYSTEMS:  CONSTITUTIONAL: No weakness, fevers or chills  EYES/ENT: No visual changes;  No vertigo or throat pain   NECK: No pain or stiffness  RESPIRATORY: No cough, wheezing, hemoptysis; No shortness of breath  CARDIOVASCULAR: No chest pain or palpitations  GASTROINTESTINAL: No abdominal or epigastric pain. No nausea, vomiting, or hematemesis; No diarrhea or constipation. No melena or hematochezia.  GENITOURINARY: No dysuria, frequency or hematuria  NEUROLOGICAL: No numbness or weakness  SKIN: No itching, burning, rashes, or lesions   All other review of systems is negative unless indicated above.    Physical Exam:  General: WN/WD NAD  Respiratory: airway patent, respirations unlabored, no increased WoB  Neurology: A&Ox3, nonfocal, PERSON x 4  Abdominal: Soft, obese, NT, ND, no rebounding or guarding  Midline incision: staples line C/D/I, no exema or erythema appreciated  Port Incisions: C/D/I  GALIAN Drain: minimal SSF output      LABS:                        8.5    12.51 )-----------( 464      ( 2021 07:37 )             26.0     04-12    137  |  98  |  14  ----------------------------<  122<H>  3.9   |  27  |  1.17    Ca    9.5      2021 07:37  Phos  3.3     04-12  Mg     1.8     -12        Urinalysis Basic - ( 2021 13:24 )    Color: Light Yellow / Appearance: Clear / S.013 / pH: x  Gluc: x / Ketone: Negative  / Bili: Negative / Urobili: <2 mg/dL   Blood: x / Protein: Negative / Nitrite: Negative   Leuk Esterase: Negative / RBC: x / WBC x   Sq Epi: x / Non Sq Epi: x / Bacteria: x        RADIOLOGY & ADDITIONAL STUDIES: DATE OF SERVICE: 21 @ 08:29    INTERVAL HPI/OVERNIGHT EVENTS: Patient seen and examined, resting comfortably at bedside awake and alert. Patient c/o moderate to severe gas pains in the mornings. Patient reports tolerating diet without nausea or vomiting. Admits to passing little flatus, reports small BM this AM. Patient endorses voiding without issues and has been out of bed and ambulating. Denies fever, chills, SOB, or chest pain. Pain well controlled.    STATUS POST:  left hemicolectomy    POST OPERATIVE DAY #:  8    MEDICATIONS  (STANDING):  amLODIPine   Tablet 10 milliGRAM(s) Oral daily  carvedilol 6.25 milliGRAM(s) Oral every 12 hours  chlorhexidine 2% Cloths 1 Application(s) Topical <User Schedule>  dextrose 5%. 1000 milliLiter(s) (50 mL/Hr) IV Continuous <Continuous>  dextrose 5%. 1000 milliLiter(s) (100 mL/Hr) IV Continuous <Continuous>  dextrose 50% Injectable 25 Gram(s) IV Push once  dextrose 50% Injectable 12.5 Gram(s) IV Push once  dextrose 50% Injectable 25 Gram(s) IV Push once  epoetin simon-epbx (RETACRIT) Injectable 44406 Unit(s) SubCutaneous <User Schedule>  glucagon  Injectable 1 milliGRAM(s) IntraMuscular once  heparin   Injectable 5000 Unit(s) SubCutaneous every 12 hours  hydrALAZINE 25 milliGRAM(s) Oral two times a day  hydrochlorothiazide 25 milliGRAM(s) Oral daily  insulin lispro (ADMELOG) corrective regimen sliding scale   SubCutaneous three times a day before meals  insulin lispro (ADMELOG) corrective regimen sliding scale   SubCutaneous at bedtime  sodium chloride 0.9% lock flush 3 milliLiter(s) IV Push every 8 hours    MEDICATIONS  (PRN):  acetaminophen   Tablet .. 650 milliGRAM(s) Oral every 6 hours PRN Mild Pain (1 - 3)  benzocaine 15 mG/menthol 3.6 mG (Sugar-Free) Lozenge 1 Lozenge Oral every 4 hours PRN Sore Throat  naloxone Injectable 0.1 milliGRAM(s) IV Push every 3 minutes PRN For ANY of the following changes in patient status:  A. RR LESS THAN 10 breaths per minute, B. Oxygen saturation LESS THAN 90%, C. Sedation score of 6  ondansetron Injectable 4 milliGRAM(s) IV Push every 6 hours PRN Nausea  oxyCODONE    IR 10 milliGRAM(s) Oral every 6 hours PRN Severe Pain (7 - 10)  oxyCODONE    IR 5 milliGRAM(s) Oral every 6 hours PRN Moderate Pain (4 - 6)      Vital Signs Last 24 Hrs  T(C): 36.8 (2021 05:14), Max: 37.1 (2021 10:43)  T(F): 98.2 (2021 05:14), Max: 98.8 (2021 10:43)  HR: 76 (2021 05:14) (68 - 79)  BP: 154/66 (2021 05:14) (133/68 - 170/67)  BP(mean): --  RR: 18 (2021 05:14) (16 - 18)  SpO2: 95% (2021 05:14) (95% - 100%)  I&O's Detail    2021 07:01  -  2021 07:00  --------------------------------------------------------  IN:    Oral Fluid: 1120 mL  Total IN: 1120 mL    OUT:    Bulb (mL): 2.5 mL    Voided (mL): 1550 mL  Total OUT: 1552.5 mL    Total NET: -432.5 mL          REVIEW OF SYSTEMS:  CONSTITUTIONAL: No weakness, fevers or chills  EYES/ENT: No visual changes;  No vertigo or throat pain   NECK: No pain or stiffness  RESPIRATORY: No cough, wheezing, hemoptysis; No shortness of breath  CARDIOVASCULAR: No chest pain or palpitations  GASTROINTESTINAL: No abdominal or epigastric pain. No nausea, vomiting, or hematemesis; No diarrhea or constipation. No melena or hematochezia.  GENITOURINARY: No dysuria, frequency or hematuria  NEUROLOGICAL: No numbness or weakness  SKIN: No itching, burning, rashes, or lesions   All other review of systems is negative unless indicated above.    Physical Exam:  General: WN/WD NAD  Respiratory: airway patent, respirations unlabored, no increased WoB  Neurology: A&Ox3, nonfocal, PERSON x 4  Abdominal: Soft, obese, NT, ND, no rebounding or guarding  Midline incision: staples line C/D/I, no exema or erythema appreciated  Port Incisions: C/D/I  GALINA Drain: minimal SSF output      LABS:                        8.5    12.51 )-----------( 464      ( 2021 07:37 )             26.0     04-12    137  |  98  |  14  ----------------------------<  122<H>  3.9   |  27  |  1.17    Ca    9.5      2021 07:37  Phos  3.3     04-12  Mg     1.8     -12        Urinalysis Basic - ( 2021 13:24 )    Color: Light Yellow / Appearance: Clear / S.013 / pH: x  Gluc: x / Ketone: Negative  / Bili: Negative / Urobili: <2 mg/dL   Blood: x / Protein: Negative / Nitrite: Negative   Leuk Esterase: Negative / RBC: x / WBC x   Sq Epi: x / Non Sq Epi: x / Bacteria: x        RADIOLOGY & ADDITIONAL STUDIES: DATE OF SERVICE: 21 @ 08:29    INTERVAL HPI/OVERNIGHT EVENTS: Patient seen and examined, resting comfortably at bedside awake and alert. Patient c/o moderate to severe gas pains in the mornings. Patient reports tolerating diet without nausea or vomiting. Admits to passing little flatus, reports small BM this AM. Patient endorses voiding without issues and has been out of bed and ambulating. Denies fever, chills, SOB, or chest pain. Pain well controlled.    STATUS POST:  left hemicolectomy    POST OPERATIVE DAY #:  8    MEDICATIONS  (STANDING):  amLODIPine   Tablet 10 milliGRAM(s) Oral daily  carvedilol 6.25 milliGRAM(s) Oral every 12 hours  chlorhexidine 2% Cloths 1 Application(s) Topical <User Schedule>  dextrose 5%. 1000 milliLiter(s) (50 mL/Hr) IV Continuous <Continuous>  dextrose 5%. 1000 milliLiter(s) (100 mL/Hr) IV Continuous <Continuous>  dextrose 50% Injectable 25 Gram(s) IV Push once  dextrose 50% Injectable 12.5 Gram(s) IV Push once  dextrose 50% Injectable 25 Gram(s) IV Push once  epoetin simon-epbx (RETACRIT) Injectable 13759 Unit(s) SubCutaneous <User Schedule>  glucagon  Injectable 1 milliGRAM(s) IntraMuscular once  heparin   Injectable 5000 Unit(s) SubCutaneous every 12 hours  hydrALAZINE 25 milliGRAM(s) Oral two times a day  hydrochlorothiazide 25 milliGRAM(s) Oral daily  insulin lispro (ADMELOG) corrective regimen sliding scale   SubCutaneous three times a day before meals  insulin lispro (ADMELOG) corrective regimen sliding scale   SubCutaneous at bedtime  sodium chloride 0.9% lock flush 3 milliLiter(s) IV Push every 8 hours    MEDICATIONS  (PRN):  acetaminophen   Tablet .. 650 milliGRAM(s) Oral every 6 hours PRN Mild Pain (1 - 3)  benzocaine 15 mG/menthol 3.6 mG (Sugar-Free) Lozenge 1 Lozenge Oral every 4 hours PRN Sore Throat  naloxone Injectable 0.1 milliGRAM(s) IV Push every 3 minutes PRN For ANY of the following changes in patient status:  A. RR LESS THAN 10 breaths per minute, B. Oxygen saturation LESS THAN 90%, C. Sedation score of 6  ondansetron Injectable 4 milliGRAM(s) IV Push every 6 hours PRN Nausea  oxyCODONE    IR 10 milliGRAM(s) Oral every 6 hours PRN Severe Pain (7 - 10)  oxyCODONE    IR 5 milliGRAM(s) Oral every 6 hours PRN Moderate Pain (4 - 6)      Vital Signs Last 24 Hrs  T(C): 36.8 (2021 05:14), Max: 37.1 (2021 10:43)  T(F): 98.2 (2021 05:14), Max: 98.8 (2021 10:43)  HR: 76 (2021 05:14) (68 - 79)  BP: 154/66 (2021 05:14) (133/68 - 170/67)  BP(mean): --  RR: 18 (2021 05:14) (16 - 18)  SpO2: 95% (2021 05:14) (95% - 100%)  I&O's Detail    2021 07:01  -  2021 07:00  --------------------------------------------------------  IN:    Oral Fluid: 1120 mL  Total IN: 1120 mL    OUT:    Bulb (mL): 2.5 mL    Voided (mL): 1550 mL  Total OUT: 1552.5 mL    Total NET: -432.5 mL          REVIEW OF SYSTEMS:  CONSTITUTIONAL: No weakness, fevers or chills  EYES/ENT: No visual changes;  No vertigo or throat pain   NECK: No pain or stiffness  RESPIRATORY: No cough, wheezing, hemoptysis; No shortness of breath  CARDIOVASCULAR: No chest pain or palpitations  GASTROINTESTINAL: No abdominal or epigastric pain. No nausea, no vomiting, or hematemesis; No diarrhea or constipation. No melena or hematochezia.  GENITOURINARY: No dysuria, frequency or hematuria  NEUROLOGICAL: No numbness or weakness  SKIN: No itching, burning, rashes, or lesions   All other review of systems is negative unless indicated above.    Physical Exam:  General: WN/WD NAD  Respiratory: airway patent, respirations unlabored, no increased WoB  Neurology: A&Ox3, nonfocal, PERSON x 4  Abdominal: Soft, obese, NT, Mildly distended, no rebounding or guarding  Midline incision: staples line C/D/I, no edema or erythema appreciated  Wound: C/D/I  GALINA Drain: minimal SSF output      LABS:                        8.5    12.51 )-----------( 464      ( 2021 07:37 )             26.0     04-12    137  |  98  |  14  ----------------------------<  122<H>  3.9   |  27  |  1.17    Ca    9.5      2021 07:37  Phos  3.3     04-12  Mg     1.8     -12        Urinalysis Basic - ( 2021 13:24 )    Color: Light Yellow / Appearance: Clear / S.013 / pH: x  Gluc: x / Ketone: Negative  / Bili: Negative / Urobili: <2 mg/dL   Blood: x / Protein: Negative / Nitrite: Negative   Leuk Esterase: Negative / RBC: x / WBC x   Sq Epi: x / Non Sq Epi: x / Bacteria: x        RADIOLOGY & ADDITIONAL STUDIES:

## 2021-04-13 NOTE — PROGRESS NOTE ADULT - NSICDXPILOT_GEN_ALL_CORE
[place]

## 2021-04-13 NOTE — PROGRESS NOTE ADULT - ASSESSMENT
70 year old Jehovah witness, PMHx HTN, HLD, Type 2 DM, CAD, and recent dx of malignant neoplasm of left colon, s/p laparoscopic converted to open left hemicolectomy on 04/05, recovering appropriately on the floors.    PLAN:  - Diabetic LRD  - cont to trend H/H and WBC  - Appreciate Heme/Onc recs: continue procrit TIW, limit blood draws  - Incentive spirometry  - Pain control: PO meds  - encourage ambulation, OOB to chair  - VTE prophylaxis with SQH  - Dispo planning    A Team Surgery   [id number]

## 2021-04-20 RX ORDER — OXYCODONE HYDROCHLORIDE 5 MG/1
1 TABLET ORAL
Qty: 12 | Refills: 0
Start: 2021-04-20 | End: 2021-04-22

## 2021-05-19 PROBLEM — C19 MALIGNANT NEOPLASM OF RECTOSIGMOID JUNCTION: Chronic | Status: ACTIVE | Noted: 2021-03-30

## 2021-05-19 PROBLEM — U07.1 COVID-19: Chronic | Status: ACTIVE | Noted: 2021-03-30

## 2021-05-19 PROBLEM — Z95.5 PRESENCE OF CORONARY ANGIOPLASTY IMPLANT AND GRAFT: Chronic | Status: ACTIVE | Noted: 2021-03-30

## 2022-07-12 NOTE — ASU PREOP CHECKLIST - ADVANCE DIRECTIVE ADDRESSED/READDRESSED
done Dutasteride Counseling: Dustasteride Counseling:  I discussed with the patient the risks of use of dutasteride including but not limited to decreased libido, decreased ejaculate volume, and gynecomastia. Women who can become pregnant should not handle medication.  All of the patient's questions and concerns were addressed. Dutasteride Male Counseling: Dustasteride Counseling:  I discussed with the patient the risks of use of dutasteride including but not limited to decreased libido, decreased ejaculate volume, and gynecomastia. Women who can become pregnant should not handle medication.  All of the patient's questions and concerns were addressed.

## 2022-11-20 NOTE — ASU PATIENT PROFILE, ADULT - PMH
"Discharge Planner Post-Acute Rehab SLP:      Discharge Plan:  SLP      Precautions: aspiration, fall,      Current Status:  Hearing: WFL  Vision: reading glasses  Communication: mild-mod expressive-receptive language impairments c/b frequent paraphasia, word/sound/syllable substitutions, semantic substitutions within conversation. Expresses basic wants/needs WFL. Mild dysarthria   Cognition: Unable to assess d/t extent of language impairment. Suspect impaired reasoning/attention noted during assessment   Swallow: Regular texture, mildly thick (2) liquid. NO straw. Ensure upright all PO, small/single sips, alternate liquids/solids, occasional throat clear, oral care 3x day.     Assessment:  Pt seen with wife present for session. Facilitated thin liquid (coffee, cranberry juice) intake for pt to practice bolus size modification strategy. SLP demo'd 2x (start and middle of session)appropriate bolus size with pill cup, juice poured into pill cup to 5cc line, then poured into plastic cup to demo amount in drinking cup. Pt consumed 24x single sips over session, self monitored sip size and occasionally stated \"that was too big.\" No reflexive coughing, throat clearing, or wet vocal quality with thin liquid intake this session.    Other Barriers to Discharge (Family Training, etc): diet training pending progress, communication/lang strategies to spouse/family         " CAD (coronary artery disease)  s/p cardiac stent 2014 X 1 ( LOY) @ [facility]  Calculus of kidney  b/l  Cardiac murmur    Carotid artery plaque, bilateral  mild  COVID-19  april 2020  DM2 (diabetes mellitus, type 2)  Off Metformin since '2017. Now diet- managed  Fibroid, uterine  ' 88   surgically treated; benign  Heart murmur  mild  Hyperlipidemia    Hypertension    Latex allergy    Malignant neoplasm of rectosigmoid (colon)    Morbid obesity  BMI  42.4  Stented coronary artery

## 2023-04-29 NOTE — DISCHARGE NOTE ADULT - PHYSICIAN SECTION COMPLETE
Eleazar Cunningham is a 30 year old male presenting to the walk-in clinic today for hematuria x3 days.  Patient states pink tinged urine.  Patient states he is having mild lower abdominal pain and flank pain. Patient states some discomfort when urinating. Unable to say if there has been urinary frequency or retention.     Denies taking any OTC medication    Swabs/Specimens collected during rooming process:  Urine       BP greater than 140/90: No  Recheck completed: NA    PPE worn during room process  Writer: mask, face shield/eye protection, gloves  Patient: mask    Patient would like communication of their results via:     [employer]   Yes

## 2023-10-19 NOTE — H&P PST ADULT - REASON FOR ADMISSION
PAST MEDICAL HISTORY:  Asthma     SVT (supraventricular tachycardia)      "I'm having sigmoid colon surgery"

## 2023-10-31 NOTE — PATIENT PROFILE ADULT. - MEDICATION HERBAL REMEDIES, PROFILE
Continue to rest ice and elevate the foot  ibuprofen as needed for pain  Follow-up with primary care doctor and if symptoms worsen podiatrist  return to the urgent care symptoms worsen
no

## 2023-11-07 ENCOUNTER — NON-APPOINTMENT (OUTPATIENT)
Age: 73
End: 2023-11-07

## 2023-11-07 ENCOUNTER — EMERGENCY (EMERGENCY)
Facility: HOSPITAL | Age: 73
LOS: 1 days | Discharge: HOME | End: 2023-11-07
Attending: EMERGENCY MEDICINE
Payer: MEDICARE

## 2023-11-07 VITALS
OXYGEN SATURATION: 97 % | HEART RATE: 58 BPM | HEIGHT: 62 IN | DIASTOLIC BLOOD PRESSURE: 64 MMHG | WEIGHT: 220.02 LBS | SYSTOLIC BLOOD PRESSURE: 122 MMHG | RESPIRATION RATE: 18 BRPM | TEMPERATURE: 98 F

## 2023-11-07 DIAGNOSIS — Z98.891 HISTORY OF UTERINE SCAR FROM PREVIOUS SURGERY: Chronic | ICD-10-CM

## 2023-11-07 DIAGNOSIS — Z90.710 ACQUIRED ABSENCE OF BOTH CERVIX AND UTERUS: Chronic | ICD-10-CM

## 2023-11-07 DIAGNOSIS — Z95.5 PRESENCE OF CORONARY ANGIOPLASTY IMPLANT AND GRAFT: Chronic | ICD-10-CM

## 2023-11-07 DIAGNOSIS — Z90.49 ACQUIRED ABSENCE OF OTHER SPECIFIED PARTS OF DIGESTIVE TRACT: Chronic | ICD-10-CM

## 2023-11-07 DIAGNOSIS — Z98.890 OTHER SPECIFIED POSTPROCEDURAL STATES: Chronic | ICD-10-CM

## 2023-11-07 LAB
ALBUMIN SERPL ELPH-MCNC: 4.4 G/DL — SIGNIFICANT CHANGE UP (ref 3.3–5.2)
ALBUMIN SERPL ELPH-MCNC: 4.4 G/DL — SIGNIFICANT CHANGE UP (ref 3.3–5.2)
ALP SERPL-CCNC: 141 U/L — HIGH (ref 40–120)
ALP SERPL-CCNC: 141 U/L — HIGH (ref 40–120)
ALT FLD-CCNC: 12 U/L — SIGNIFICANT CHANGE UP
ALT FLD-CCNC: 12 U/L — SIGNIFICANT CHANGE UP
ANION GAP SERPL CALC-SCNC: 12 MMOL/L — SIGNIFICANT CHANGE UP (ref 5–17)
ANION GAP SERPL CALC-SCNC: 12 MMOL/L — SIGNIFICANT CHANGE UP (ref 5–17)
APPEARANCE UR: CLEAR — SIGNIFICANT CHANGE UP
APPEARANCE UR: CLEAR — SIGNIFICANT CHANGE UP
AST SERPL-CCNC: 20 U/L — SIGNIFICANT CHANGE UP
AST SERPL-CCNC: 20 U/L — SIGNIFICANT CHANGE UP
BACTERIA # UR AUTO: NEGATIVE /HPF — SIGNIFICANT CHANGE UP
BACTERIA # UR AUTO: NEGATIVE /HPF — SIGNIFICANT CHANGE UP
BASOPHILS # BLD AUTO: 0.08 K/UL — SIGNIFICANT CHANGE UP (ref 0–0.2)
BASOPHILS # BLD AUTO: 0.08 K/UL — SIGNIFICANT CHANGE UP (ref 0–0.2)
BASOPHILS NFR BLD AUTO: 0.8 % — SIGNIFICANT CHANGE UP (ref 0–2)
BASOPHILS NFR BLD AUTO: 0.8 % — SIGNIFICANT CHANGE UP (ref 0–2)
BILIRUB SERPL-MCNC: 0.4 MG/DL — SIGNIFICANT CHANGE UP (ref 0.4–2)
BILIRUB SERPL-MCNC: 0.4 MG/DL — SIGNIFICANT CHANGE UP (ref 0.4–2)
BILIRUB UR-MCNC: NEGATIVE — SIGNIFICANT CHANGE UP
BILIRUB UR-MCNC: NEGATIVE — SIGNIFICANT CHANGE UP
BUN SERPL-MCNC: 19.2 MG/DL — SIGNIFICANT CHANGE UP (ref 8–20)
BUN SERPL-MCNC: 19.2 MG/DL — SIGNIFICANT CHANGE UP (ref 8–20)
CALCIUM SERPL-MCNC: 9.8 MG/DL — SIGNIFICANT CHANGE UP (ref 8.4–10.5)
CALCIUM SERPL-MCNC: 9.8 MG/DL — SIGNIFICANT CHANGE UP (ref 8.4–10.5)
CAST: 1 /LPF — SIGNIFICANT CHANGE UP (ref 0–4)
CAST: 1 /LPF — SIGNIFICANT CHANGE UP (ref 0–4)
CHLORIDE SERPL-SCNC: 97 MMOL/L — SIGNIFICANT CHANGE UP (ref 96–108)
CHLORIDE SERPL-SCNC: 97 MMOL/L — SIGNIFICANT CHANGE UP (ref 96–108)
CO2 SERPL-SCNC: 26 MMOL/L — SIGNIFICANT CHANGE UP (ref 22–29)
CO2 SERPL-SCNC: 26 MMOL/L — SIGNIFICANT CHANGE UP (ref 22–29)
COLOR SPEC: YELLOW — SIGNIFICANT CHANGE UP
COLOR SPEC: YELLOW — SIGNIFICANT CHANGE UP
CREAT SERPL-MCNC: 1.33 MG/DL — HIGH (ref 0.5–1.3)
CREAT SERPL-MCNC: 1.33 MG/DL — HIGH (ref 0.5–1.3)
DIFF PNL FLD: NEGATIVE — SIGNIFICANT CHANGE UP
DIFF PNL FLD: NEGATIVE — SIGNIFICANT CHANGE UP
EGFR: 42 ML/MIN/1.73M2 — LOW
EGFR: 42 ML/MIN/1.73M2 — LOW
EOSINOPHIL # BLD AUTO: 0.3 K/UL — SIGNIFICANT CHANGE UP (ref 0–0.5)
EOSINOPHIL # BLD AUTO: 0.3 K/UL — SIGNIFICANT CHANGE UP (ref 0–0.5)
EOSINOPHIL NFR BLD AUTO: 3 % — SIGNIFICANT CHANGE UP (ref 0–6)
EOSINOPHIL NFR BLD AUTO: 3 % — SIGNIFICANT CHANGE UP (ref 0–6)
GLUCOSE BLDC GLUCOMTR-MCNC: 129 MG/DL — HIGH (ref 70–99)
GLUCOSE BLDC GLUCOMTR-MCNC: 129 MG/DL — HIGH (ref 70–99)
GLUCOSE SERPL-MCNC: 99 MG/DL — SIGNIFICANT CHANGE UP (ref 70–99)
GLUCOSE SERPL-MCNC: 99 MG/DL — SIGNIFICANT CHANGE UP (ref 70–99)
GLUCOSE UR QL: NEGATIVE MG/DL — SIGNIFICANT CHANGE UP
GLUCOSE UR QL: NEGATIVE MG/DL — SIGNIFICANT CHANGE UP
HCT VFR BLD CALC: 37.2 % — SIGNIFICANT CHANGE UP (ref 34.5–45)
HCT VFR BLD CALC: 37.2 % — SIGNIFICANT CHANGE UP (ref 34.5–45)
HGB BLD-MCNC: 12.5 G/DL — SIGNIFICANT CHANGE UP (ref 11.5–15.5)
HGB BLD-MCNC: 12.5 G/DL — SIGNIFICANT CHANGE UP (ref 11.5–15.5)
IMM GRANULOCYTES NFR BLD AUTO: 0.3 % — SIGNIFICANT CHANGE UP (ref 0–0.9)
IMM GRANULOCYTES NFR BLD AUTO: 0.3 % — SIGNIFICANT CHANGE UP (ref 0–0.9)
KETONES UR-MCNC: NEGATIVE MG/DL — SIGNIFICANT CHANGE UP
KETONES UR-MCNC: NEGATIVE MG/DL — SIGNIFICANT CHANGE UP
LEUKOCYTE ESTERASE UR-ACNC: NEGATIVE — SIGNIFICANT CHANGE UP
LEUKOCYTE ESTERASE UR-ACNC: NEGATIVE — SIGNIFICANT CHANGE UP
LYMPHOCYTES # BLD AUTO: 2.21 K/UL — SIGNIFICANT CHANGE UP (ref 1–3.3)
LYMPHOCYTES # BLD AUTO: 2.21 K/UL — SIGNIFICANT CHANGE UP (ref 1–3.3)
LYMPHOCYTES # BLD AUTO: 22 % — SIGNIFICANT CHANGE UP (ref 13–44)
LYMPHOCYTES # BLD AUTO: 22 % — SIGNIFICANT CHANGE UP (ref 13–44)
MAGNESIUM SERPL-MCNC: 2.3 MG/DL — SIGNIFICANT CHANGE UP (ref 1.6–2.6)
MAGNESIUM SERPL-MCNC: 2.3 MG/DL — SIGNIFICANT CHANGE UP (ref 1.6–2.6)
MCHC RBC-ENTMCNC: 30.5 PG — SIGNIFICANT CHANGE UP (ref 27–34)
MCHC RBC-ENTMCNC: 30.5 PG — SIGNIFICANT CHANGE UP (ref 27–34)
MCHC RBC-ENTMCNC: 33.6 GM/DL — SIGNIFICANT CHANGE UP (ref 32–36)
MCHC RBC-ENTMCNC: 33.6 GM/DL — SIGNIFICANT CHANGE UP (ref 32–36)
MCV RBC AUTO: 90.7 FL — SIGNIFICANT CHANGE UP (ref 80–100)
MCV RBC AUTO: 90.7 FL — SIGNIFICANT CHANGE UP (ref 80–100)
MONOCYTES # BLD AUTO: 0.78 K/UL — SIGNIFICANT CHANGE UP (ref 0–0.9)
MONOCYTES # BLD AUTO: 0.78 K/UL — SIGNIFICANT CHANGE UP (ref 0–0.9)
MONOCYTES NFR BLD AUTO: 7.8 % — SIGNIFICANT CHANGE UP (ref 2–14)
MONOCYTES NFR BLD AUTO: 7.8 % — SIGNIFICANT CHANGE UP (ref 2–14)
NEUTROPHILS # BLD AUTO: 6.66 K/UL — SIGNIFICANT CHANGE UP (ref 1.8–7.4)
NEUTROPHILS # BLD AUTO: 6.66 K/UL — SIGNIFICANT CHANGE UP (ref 1.8–7.4)
NEUTROPHILS NFR BLD AUTO: 66.1 % — SIGNIFICANT CHANGE UP (ref 43–77)
NEUTROPHILS NFR BLD AUTO: 66.1 % — SIGNIFICANT CHANGE UP (ref 43–77)
NITRITE UR-MCNC: NEGATIVE — SIGNIFICANT CHANGE UP
NITRITE UR-MCNC: NEGATIVE — SIGNIFICANT CHANGE UP
NT-PROBNP SERPL-SCNC: <36 PG/ML — SIGNIFICANT CHANGE UP (ref 0–300)
NT-PROBNP SERPL-SCNC: <36 PG/ML — SIGNIFICANT CHANGE UP (ref 0–300)
PH UR: 7.5 — SIGNIFICANT CHANGE UP (ref 5–8)
PH UR: 7.5 — SIGNIFICANT CHANGE UP (ref 5–8)
PLATELET # BLD AUTO: 371 K/UL — SIGNIFICANT CHANGE UP (ref 150–400)
PLATELET # BLD AUTO: 371 K/UL — SIGNIFICANT CHANGE UP (ref 150–400)
POTASSIUM SERPL-MCNC: 4.1 MMOL/L — SIGNIFICANT CHANGE UP (ref 3.5–5.3)
POTASSIUM SERPL-MCNC: 4.1 MMOL/L — SIGNIFICANT CHANGE UP (ref 3.5–5.3)
POTASSIUM SERPL-SCNC: 4.1 MMOL/L — SIGNIFICANT CHANGE UP (ref 3.5–5.3)
POTASSIUM SERPL-SCNC: 4.1 MMOL/L — SIGNIFICANT CHANGE UP (ref 3.5–5.3)
PROT SERPL-MCNC: 7.9 G/DL — SIGNIFICANT CHANGE UP (ref 6.6–8.7)
PROT SERPL-MCNC: 7.9 G/DL — SIGNIFICANT CHANGE UP (ref 6.6–8.7)
PROT UR-MCNC: NEGATIVE MG/DL — SIGNIFICANT CHANGE UP
PROT UR-MCNC: NEGATIVE MG/DL — SIGNIFICANT CHANGE UP
RBC # BLD: 4.1 M/UL — SIGNIFICANT CHANGE UP (ref 3.8–5.2)
RBC # BLD: 4.1 M/UL — SIGNIFICANT CHANGE UP (ref 3.8–5.2)
RBC # FLD: 13 % — SIGNIFICANT CHANGE UP (ref 10.3–14.5)
RBC # FLD: 13 % — SIGNIFICANT CHANGE UP (ref 10.3–14.5)
RBC CASTS # UR COMP ASSIST: 1 /HPF — SIGNIFICANT CHANGE UP (ref 0–4)
RBC CASTS # UR COMP ASSIST: 1 /HPF — SIGNIFICANT CHANGE UP (ref 0–4)
SODIUM SERPL-SCNC: 135 MMOL/L — SIGNIFICANT CHANGE UP (ref 135–145)
SODIUM SERPL-SCNC: 135 MMOL/L — SIGNIFICANT CHANGE UP (ref 135–145)
SP GR SPEC: 1.01 — SIGNIFICANT CHANGE UP (ref 1–1.03)
SP GR SPEC: 1.01 — SIGNIFICANT CHANGE UP (ref 1–1.03)
SQUAMOUS # UR AUTO: 1 /HPF — SIGNIFICANT CHANGE UP (ref 0–5)
SQUAMOUS # UR AUTO: 1 /HPF — SIGNIFICANT CHANGE UP (ref 0–5)
TROPONIN T, HIGH SENSITIVITY RESULT: 15 NG/L — SIGNIFICANT CHANGE UP (ref 0–51)
TROPONIN T, HIGH SENSITIVITY RESULT: 15 NG/L — SIGNIFICANT CHANGE UP (ref 0–51)
UROBILINOGEN FLD QL: 0.2 MG/DL — SIGNIFICANT CHANGE UP (ref 0.2–1)
UROBILINOGEN FLD QL: 0.2 MG/DL — SIGNIFICANT CHANGE UP (ref 0.2–1)
WBC # BLD: 10.06 K/UL — SIGNIFICANT CHANGE UP (ref 3.8–10.5)
WBC # BLD: 10.06 K/UL — SIGNIFICANT CHANGE UP (ref 3.8–10.5)
WBC # FLD AUTO: 10.06 K/UL — SIGNIFICANT CHANGE UP (ref 3.8–10.5)
WBC # FLD AUTO: 10.06 K/UL — SIGNIFICANT CHANGE UP (ref 3.8–10.5)
WBC UR QL: 1 /HPF — SIGNIFICANT CHANGE UP (ref 0–5)
WBC UR QL: 1 /HPF — SIGNIFICANT CHANGE UP (ref 0–5)

## 2023-11-07 PROCEDURE — 99223 1ST HOSP IP/OBS HIGH 75: CPT | Mod: FS

## 2023-11-07 PROCEDURE — 93010 ELECTROCARDIOGRAM REPORT: CPT

## 2023-11-07 PROCEDURE — 93306 TTE W/DOPPLER COMPLETE: CPT | Mod: 26

## 2023-11-07 PROCEDURE — 71045 X-RAY EXAM CHEST 1 VIEW: CPT | Mod: 26

## 2023-11-07 RX ORDER — ATORVASTATIN CALCIUM 80 MG/1
40 TABLET, FILM COATED ORAL AT BEDTIME
Refills: 0 | Status: DISCONTINUED | OUTPATIENT
Start: 2023-11-07 | End: 2023-11-15

## 2023-11-07 RX ORDER — METHOCARBAMOL 500 MG/1
1500 TABLET, FILM COATED ORAL DAILY
Refills: 0 | Status: DISCONTINUED | OUTPATIENT
Start: 2023-11-07 | End: 2023-11-15

## 2023-11-07 RX ORDER — CARVEDILOL PHOSPHATE 80 MG/1
1 CAPSULE, EXTENDED RELEASE ORAL
Qty: 0 | Refills: 0 | DISCHARGE

## 2023-11-07 RX ORDER — SODIUM CHLORIDE 9 MG/ML
1000 INJECTION INTRAMUSCULAR; INTRAVENOUS; SUBCUTANEOUS ONCE
Refills: 0 | Status: COMPLETED | OUTPATIENT
Start: 2023-11-07 | End: 2023-11-07

## 2023-11-07 RX ORDER — HYDROCHLOROTHIAZIDE 25 MG
1 TABLET ORAL
Qty: 0 | Refills: 0 | DISCHARGE

## 2023-11-07 RX ORDER — DEXTROSE 50 % IN WATER 50 %
12.5 SYRINGE (ML) INTRAVENOUS ONCE
Refills: 0 | Status: DISCONTINUED | OUTPATIENT
Start: 2023-11-07 | End: 2023-11-15

## 2023-11-07 RX ORDER — HYDRALAZINE HCL 50 MG
25 TABLET ORAL
Refills: 0 | Status: DISCONTINUED | OUTPATIENT
Start: 2023-11-07 | End: 2023-11-15

## 2023-11-07 RX ORDER — ASPIRIN/CALCIUM CARB/MAGNESIUM 324 MG
0 TABLET ORAL
Qty: 0 | Refills: 0 | DISCHARGE

## 2023-11-07 RX ORDER — AMLODIPINE BESYLATE 2.5 MG/1
1 TABLET ORAL
Qty: 0 | Refills: 0 | DISCHARGE

## 2023-11-07 RX ORDER — GLUCAGON INJECTION, SOLUTION 0.5 MG/.1ML
1 INJECTION, SOLUTION SUBCUTANEOUS ONCE
Refills: 0 | Status: DISCONTINUED | OUTPATIENT
Start: 2023-11-07 | End: 2023-11-15

## 2023-11-07 RX ORDER — INSULIN LISPRO 100/ML
VIAL (ML) SUBCUTANEOUS
Refills: 0 | Status: DISCONTINUED | OUTPATIENT
Start: 2023-11-07 | End: 2023-11-15

## 2023-11-07 RX ORDER — ATORVASTATIN CALCIUM 80 MG/1
1 TABLET, FILM COATED ORAL
Refills: 0 | DISCHARGE

## 2023-11-07 RX ORDER — DEXTROSE 50 % IN WATER 50 %
25 SYRINGE (ML) INTRAVENOUS ONCE
Refills: 0 | Status: DISCONTINUED | OUTPATIENT
Start: 2023-11-07 | End: 2023-11-15

## 2023-11-07 RX ORDER — SODIUM CHLORIDE 9 MG/ML
1000 INJECTION, SOLUTION INTRAVENOUS
Refills: 0 | Status: DISCONTINUED | OUTPATIENT
Start: 2023-11-07 | End: 2023-11-15

## 2023-11-07 RX ORDER — ASPIRIN/CALCIUM CARB/MAGNESIUM 324 MG
81 TABLET ORAL DAILY
Refills: 0 | Status: DISCONTINUED | OUTPATIENT
Start: 2023-11-07 | End: 2023-11-15

## 2023-11-07 RX ORDER — LOSARTAN POTASSIUM 100 MG/1
100 TABLET, FILM COATED ORAL DAILY
Refills: 0 | Status: DISCONTINUED | OUTPATIENT
Start: 2023-11-07 | End: 2023-11-15

## 2023-11-07 RX ORDER — MULTIVIT-MIN/FERROUS GLUCONATE 9 MG/15 ML
1 LIQUID (ML) ORAL
Qty: 0 | Refills: 0 | DISCHARGE

## 2023-11-07 RX ORDER — IBUPROFEN 200 MG
600 TABLET ORAL ONCE
Refills: 0 | Status: COMPLETED | OUTPATIENT
Start: 2023-11-07 | End: 2023-11-07

## 2023-11-07 RX ORDER — CARVEDILOL PHOSPHATE 80 MG/1
6.25 CAPSULE, EXTENDED RELEASE ORAL EVERY 12 HOURS
Refills: 0 | Status: DISCONTINUED | OUTPATIENT
Start: 2023-11-07 | End: 2023-11-15

## 2023-11-07 RX ORDER — LOSARTAN/HYDROCHLOROTHIAZIDE 100MG-25MG
1 TABLET ORAL
Qty: 0 | Refills: 0 | DISCHARGE

## 2023-11-07 RX ORDER — HYDRALAZINE HCL 50 MG
1 TABLET ORAL
Qty: 0 | Refills: 0 | DISCHARGE

## 2023-11-07 RX ORDER — DEXTROSE 50 % IN WATER 50 %
15 SYRINGE (ML) INTRAVENOUS ONCE
Refills: 0 | Status: DISCONTINUED | OUTPATIENT
Start: 2023-11-07 | End: 2023-11-15

## 2023-11-07 RX ORDER — AMLODIPINE BESYLATE 2.5 MG/1
10 TABLET ORAL DAILY
Refills: 0 | Status: DISCONTINUED | OUTPATIENT
Start: 2023-11-07 | End: 2023-11-15

## 2023-11-07 RX ADMIN — ATORVASTATIN CALCIUM 40 MILLIGRAM(S): 80 TABLET, FILM COATED ORAL at 21:49

## 2023-11-07 RX ADMIN — CARVEDILOL PHOSPHATE 6.25 MILLIGRAM(S): 80 CAPSULE, EXTENDED RELEASE ORAL at 18:50

## 2023-11-07 RX ADMIN — METHOCARBAMOL 1500 MILLIGRAM(S): 500 TABLET, FILM COATED ORAL at 18:50

## 2023-11-07 RX ADMIN — Medication 600 MILLIGRAM(S): at 18:50

## 2023-11-07 RX ADMIN — SODIUM CHLORIDE 1000 MILLILITER(S): 9 INJECTION INTRAMUSCULAR; INTRAVENOUS; SUBCUTANEOUS at 14:32

## 2023-11-07 RX ADMIN — Medication 25 MILLIGRAM(S): at 18:50

## 2023-11-07 NOTE — ED PROVIDER NOTE - CLINICAL SUMMARY MEDICAL DECISION MAKING FREE TEXT BOX
Pt is a 74 yo female with PMH of CAD s/p 1 stent 2014, colon ca s/p resection, HLD, Type 2 DM presenting with presyncope. Pt reports that she was on Zoom when she felt lightheaded, sweaty and weakness at around 11AM today. Pt felt like she "couldn't get her head up". Denies LOC, laid down and felt improved. Pt denies fever, chills, SOB, chest pain, abdominal pain, N/V/D.  Likely vasovagal. CBC, CMP, cxr, ua/uc, ekg pending.

## 2023-11-07 NOTE — ED ADULT NURSE NOTE - OBJECTIVE STATEMENT
A&Ox4, RR even and unlabored. Skin is warm and dry. Pt had a near syncopal event while on zoom with friends. PT felt hot, sweaty and diaphoretic prior to event. Now feels close to baseline status. Reports this has happened 2 other times this week. Denies chest pain or SOB

## 2023-11-07 NOTE — ED ADULT NURSE NOTE - NSFALLHARMRISKINTERV_ED_ALL_ED
Assistance OOB with selected safe patient handling equipment if applicable/Assistance with ambulation/Communicate risk of Fall with Harm to all staff, patient, and family/Encourage patient to sit up slowly, dangle for a short time, stand at bedside before walking/Monitor gait and stability/Orthostatic vital signs/Provide visual cue: red socks, yellow wristband, yellow gown, etc/Reinforce activity limits and safety measures with patient and family/Bed in lowest position, wheels locked, appropriate side rails in place/Call bell, personal items and telephone in reach/Instruct patient to call for assistance before getting out of bed/chair/stretcher/Non-slip footwear applied when patient is off stretcher/[place] to call system/Physically safe environment - no spills, clutter or unnecessary equipment/Purposeful Proactive Rounding/Room/bathroom lighting operational, light cord in reach

## 2023-11-07 NOTE — ED CDU PROVIDER INITIAL DAY NOTE - NSICDXPASTMEDICALHX_GEN_ALL_CORE_FT
PAST MEDICAL HISTORY:  CAD (coronary artery disease) s/p cardiac stent 2014 X 1 ( LOY) @ [facility]    Calculus of kidney b/l    Cardiac murmur     Carotid artery plaque, bilateral mild    COVID-19 april 2020    DM2 (diabetes mellitus, type 2) Off Metformin since '2017. Now diet- managed    Fibroid, uterine ' 88   surgically treated; benign    Heart murmur mild    Hyperlipidemia     Hypertension     Latex allergy     Malignant neoplasm of rectosigmoid (colon)     Morbid obesity BMI  42.4    Stented coronary artery

## 2023-11-07 NOTE — ED ADULT TRIAGE NOTE - CHIEF COMPLAINT QUOTE
pt BIBA from home for dizziness on movement with nausea. no chest pain no numbness no headache no vision change and no extremity weakness. AOX3, states has happened before.  on arrival to ED.

## 2023-11-07 NOTE — ED CDU PROVIDER INITIAL DAY NOTE - NS ED ATTENDING STATEMENT MOD
This was a shared visit with the AMADO. I reviewed and verified the documentation and independently performed the documented:

## 2023-11-07 NOTE — ED ADULT TRIAGE NOTE - BSA (M2)
PATIENT INSTRUCTIONS - Podiatry / Foot & Ankle Surgery    Suture Removal:  -Keep the incision(s) dry for 48 hours; then you may shower.    -Allow the Steri-strips to fall off by themselves over several days.  Remove any strips that remain after 1 week.  -Do not soak the incision until instructed by your surgeon.  -Keep any scabs clean, dry & intact.  Clean with alcohol or betadine. Do not use hydrogen peroxide. Let them dry out during the day.  Cover with dry band-aid if needed.  -Do not apply ointment or any other topical unless specifically instructed by your surgeon.  -Continue to use compression (ACE bandage or Tubigrip) to the operative foot/ ankle.    -Continue NO WEIGHT to the operative extremity.  -You may remove the boot for ankle, toe motion and showering but no weight to the foot/ankle.    -Follow-up in 1 month .       1.99

## 2023-11-07 NOTE — ED PROVIDER NOTE - PROGRESS NOTE DETAILS
Sera: I spoke with Dr. Navarrete of [facility]3] for [facility] who agrees with echo and will see the patient tomorrow. Discussed with patient who is agreeable for observation.

## 2023-11-07 NOTE — ED CDU PROVIDER INITIAL DAY NOTE - CLINICAL SUMMARY MEDICAL DECISION MAKING FREE TEXT BOX
74yo F presenting with near syncopal episode while on zoom call. Reporting experiencing similar episodes over the past few months. ED workup unremarkable, labs, ua, cxr wnl. Obs for overnight tele monitoring, TTE read and AM cardiology c/s

## 2023-11-07 NOTE — ED ADULT NURSE REASSESSMENT NOTE - NS ED NURSE REASSESS COMMENT FT1
Assumed care for pt at 1930. pt is a&o x4 and on cardiac monitor reading 60 bpm in sinus rhyhtm. pt is on  reading 96% on room air. MD at bedside discussing plan of care with patient. pt is in gown breathing equal and unlabored. pt bed is locked and in lowest position.

## 2023-11-07 NOTE — ED CDU PROVIDER INITIAL DAY NOTE - ATTENDING APP SHARED VISIT CONTRIBUTION OF CARE
I, Slick Smith, performed a face to face bedside interview with this patient regarding history of present illness, review of symptoms and relevant past medical, social and family history.  I completed an independent physical examination. I have communicated the patient’s plan of care and disposition with the ACP.  73 year old presents with near syncope, place don obs for tele, echo, cardio consult  Gen: NAD, well appearing  CV: RRR  Pul: CTA b/l  Abd: Soft, non-distended, non-tender  Neuro: no focal deficits

## 2023-11-07 NOTE — ED PROVIDER NOTE - NSICDXPASTMEDICALHX_GEN_ALL_CORE_FT
PAST MEDICAL HISTORY:  CAD (coronary artery disease) s/p cardiac stent 2014 X 1 ( LOY) @ [facility]    Calculus of kidney b/l    Cardiac murmur     Carotid artery plaque, bilateral mild    COVID-19 april 2020    DM2 (diabetes mellitus, type 2) Off Metformin since '2017. Now diet- managed    Fibroid, uterine ' 88   surgically treated; benign    Heart murmur mild    Hyperlipidemia     Hypertension     Latex allergy     Malignant neoplasm of rectosigmoid (colon)     Morbid obesity BMI  42.4    Stented coronary artery

## 2023-11-07 NOTE — ED CDU PROVIDER INITIAL DAY NOTE - PHYSICAL EXAMINATION
Gen: No acute distress, non toxic  Head: NCAT  Eyes: pink conjunctivae, EOMI, PERRL  CV: RRR, nl s1/s2.  Resp: CTAB, normal rate and effort  GI: Abdomen soft, NT, ND. No rebound, no guarding  : No CVAT  Neuro: A&O x 3, sensorimotor intact without deficits   MSK: No spine or joint tenderness to palpation, Full ROM ext x 4  Skin: No rashes. intact and perfused.

## 2023-11-07 NOTE — ED PROVIDER NOTE - OBJECTIVE STATEMENT
Pt is a 72 yo female with PMH of CAD s/p 1 stent 2014, colon ca s/p resection, HLD, Type 2 DM presenting with presyncope. Pt reports that she was on Zoom when she felt lightheaded, sweaty and weakness at around 11AM today. Pt felt like she "couldn't get her head up". Denies LOC, laid down and felt improved. Pt denies fever, chills, SOB, chest pain, abdominal pain, N/V/D, room-spinning dizziness. Pt reports that this happened a few days ago as well.

## 2023-11-07 NOTE — ED ADULT NURSE NOTE - NSICDXPASTMEDICALHX_GEN_ALL_CORE_FT
PAST MEDICAL HISTORY:  CAD (coronary artery disease) s/p cardiac stent 2014 X 1 ( LOY) @ [facility]    Calculus of kidney b/l    Cardiac murmur     Carotid artery plaque, bilateral mild    COVID-19 april 2020    DM2 (diabetes mellitus, type 2) Off Metformin since '2017. Now diet- managed    Fibroid, uterine ' 88   surgically treated; benign    Heart murmur mild    Hyperlipidemia     Hypertension     Latex allergy     Malignant neoplasm of rectosigmoid (colon)     Morbid obesity BMI  42.4    Stented coronary artery

## 2023-11-07 NOTE — ED CDU PROVIDER INITIAL DAY NOTE - NS ED ROS FT
Gen: denies fever, chills, fatigue, weight loss  Skin: denies rashes, laceration, bruising  HEENT: denies visual changes, ear pain, nasal congestion, throat pain  Respiratory: denies MARIA, SOB, cough, wheezing  Cardiovascular: +Near syncope, +Diaphoresis. Denies chest pain, palpitations, LE edema  GI: +Nausea (resolved). denies abdominal pain, v/d  : denies dysuria, frequency, urgency, bowel/bladder incontinence  MSK: denies joint swelling/pain, back pain, neck pain  Neuro: denies headache, dizziness, weakness, numbness  Psych: denies anxiety, depression, SI/HI, visual/auditory hallucinations

## 2023-11-07 NOTE — ED PROVIDER NOTE - ATTENDING CONTRIBUTION TO CARE
I personally saw the patient with the resident, and completed the key components of the history and physical exam. I then discussed the management plan with the resident.      73-year-old female with past medical history CAD, colon cancer status post resection, diabetes, hyperlipidemia presents for an episode of near syncope while sitting, became diaphoretic, felt lightheaded and nauseous.  Symptoms are positional, have been occurring with increasing frequency over the past few months.  Her last cardiology visit was earlier this year, she cannot recall when her last echo was.    NAD, well-appearing, RRR, +AS murmur, lungs CTA B/L, ABD soft, nontender, nondistended, no lower extremity edema, 2+ symmetrical distal pulses, normal speech, normal coordination, CN II through XII symmetrically intact.    EKG without ischemia.  Patient feels better after fluids, JIN noted.  Orthostatics ordered, cardiology consulted.

## 2023-11-07 NOTE — ED CDU PROVIDER INITIAL DAY NOTE - CONSULTING PHYSICIAN
O-T Plasty Text: The defect edges were debeveled with a #15 scalpel blade.  Given the location of the defect, shape of the defect and the proximity to free margins an O-T plasty was deemed most appropriate.  Using a sterile surgical marker, an appropriate O-T plasty was drawn incorporating the defect and placing the expected incisions within the relaxed skin tension lines where possible.    The area thus outlined was incised deep to adipose tissue with a #15 scalpel blade.  The skin margins were undermined to an appropriate distance in all directions utilizing iris scissors. Dr. Navarrete

## 2023-11-07 NOTE — ED CDU PROVIDER INITIAL DAY NOTE - OBJECTIVE STATEMENT
74yo F PMHx CAD s/p 1 stent 2014, colon ca s/p resection, HLD, DMII presented to ED for evaluation of near syncopal episode. Pt reports that she was on a Zoom meeting and began feeling lightheaded, sweaty and generally weak so lowered head onto her desk. Pt felt like she "couldn't get her head up". States her colleagues on zoom call told her they were trying to call out to her however she does not have any memory of this. States next thing she remembers is EMS arriving in her home. Reports hx of similar episodes occurring every few weeks for the past few months, last episode about 1 week ago while in her bed. ED labs unremarkable, UA negative, CXR negative. No recurrence of symptoms while in ED. ED spoke with cardiology who recommend overnight tele monitoring, TTE and will formally consult in AM. Pt denies fever, chills, SOB, chest pain, abdominal pain, N/V/D, room-spinning dizziness.   Cardiology: Dr. D'Agate

## 2023-11-07 NOTE — ED CDU PROVIDER INITIAL DAY NOTE - PROGRESS NOTE DETAILS
Received during sign out. Patient in NAD. Sleeping comfortably. Pending cardiology evaluation in AM.

## 2023-11-08 LAB
A1C WITH ESTIMATED AVERAGE GLUCOSE RESULT: 6.4 % — HIGH (ref 4–5.6)
A1C WITH ESTIMATED AVERAGE GLUCOSE RESULT: 6.4 % — HIGH (ref 4–5.6)
CHOLEST SERPL-MCNC: 87 MG/DL — SIGNIFICANT CHANGE UP
CHOLEST SERPL-MCNC: 87 MG/DL — SIGNIFICANT CHANGE UP
CULTURE RESULTS: SIGNIFICANT CHANGE UP
CULTURE RESULTS: SIGNIFICANT CHANGE UP
ESTIMATED AVERAGE GLUCOSE: 137 MG/DL — HIGH (ref 68–114)
ESTIMATED AVERAGE GLUCOSE: 137 MG/DL — HIGH (ref 68–114)
GLUCOSE BLDC GLUCOMTR-MCNC: 132 MG/DL — HIGH (ref 70–99)
GLUCOSE BLDC GLUCOMTR-MCNC: 132 MG/DL — HIGH (ref 70–99)
GLUCOSE BLDC GLUCOMTR-MCNC: 142 MG/DL — HIGH (ref 70–99)
GLUCOSE BLDC GLUCOMTR-MCNC: 142 MG/DL — HIGH (ref 70–99)
GLUCOSE BLDC GLUCOMTR-MCNC: 82 MG/DL — SIGNIFICANT CHANGE UP (ref 70–99)
GLUCOSE BLDC GLUCOMTR-MCNC: 82 MG/DL — SIGNIFICANT CHANGE UP (ref 70–99)
HDLC SERPL-MCNC: 37 MG/DL — LOW
HDLC SERPL-MCNC: 37 MG/DL — LOW
LIPID PNL WITH DIRECT LDL SERPL: 39 MG/DL — SIGNIFICANT CHANGE UP
LIPID PNL WITH DIRECT LDL SERPL: 39 MG/DL — SIGNIFICANT CHANGE UP
NON HDL CHOLESTEROL: 50 MG/DL — SIGNIFICANT CHANGE UP
NON HDL CHOLESTEROL: 50 MG/DL — SIGNIFICANT CHANGE UP
SPECIMEN SOURCE: SIGNIFICANT CHANGE UP
SPECIMEN SOURCE: SIGNIFICANT CHANGE UP
TRIGL SERPL-MCNC: 53 MG/DL — SIGNIFICANT CHANGE UP
TRIGL SERPL-MCNC: 53 MG/DL — SIGNIFICANT CHANGE UP

## 2023-11-08 PROCEDURE — 99232 SBSQ HOSP IP/OBS MODERATE 35: CPT | Mod: FS

## 2023-11-08 PROCEDURE — 93880 EXTRACRANIAL BILAT STUDY: CPT | Mod: 26

## 2023-11-08 PROCEDURE — 99222 1ST HOSP IP/OBS MODERATE 55: CPT

## 2023-11-08 RX ORDER — ACETAMINOPHEN 500 MG
650 TABLET ORAL ONCE
Refills: 0 | Status: COMPLETED | OUTPATIENT
Start: 2023-11-08 | End: 2023-11-08

## 2023-11-08 RX ORDER — MECLIZINE HCL 12.5 MG
12.5 TABLET ORAL THREE TIMES A DAY
Refills: 0 | Status: DISCONTINUED | OUTPATIENT
Start: 2023-11-08 | End: 2023-11-15

## 2023-11-08 RX ORDER — ALLOPURINOL 300 MG
200 TABLET ORAL AT BEDTIME
Refills: 0 | Status: DISCONTINUED | OUTPATIENT
Start: 2023-11-08 | End: 2023-11-15

## 2023-11-08 RX ADMIN — Medication 650 MILLIGRAM(S): at 15:00

## 2023-11-08 RX ADMIN — ATORVASTATIN CALCIUM 40 MILLIGRAM(S): 80 TABLET, FILM COATED ORAL at 22:31

## 2023-11-08 RX ADMIN — AMLODIPINE BESYLATE 10 MILLIGRAM(S): 2.5 TABLET ORAL at 05:21

## 2023-11-08 RX ADMIN — Medication 25 MILLIGRAM(S): at 05:21

## 2023-11-08 RX ADMIN — Medication 200 MILLIGRAM(S): at 22:31

## 2023-11-08 RX ADMIN — Medication 81 MILLIGRAM(S): at 11:30

## 2023-11-08 RX ADMIN — Medication 200 MILLIGRAM(S): at 01:24

## 2023-11-08 RX ADMIN — Medication 25 MILLIGRAM(S): at 18:02

## 2023-11-08 RX ADMIN — LOSARTAN POTASSIUM 100 MILLIGRAM(S): 100 TABLET, FILM COATED ORAL at 05:21

## 2023-11-08 RX ADMIN — METHOCARBAMOL 1500 MILLIGRAM(S): 500 TABLET, FILM COATED ORAL at 11:30

## 2023-11-08 RX ADMIN — CARVEDILOL PHOSPHATE 6.25 MILLIGRAM(S): 80 CAPSULE, EXTENDED RELEASE ORAL at 18:02

## 2023-11-08 RX ADMIN — CARVEDILOL PHOSPHATE 6.25 MILLIGRAM(S): 80 CAPSULE, EXTENDED RELEASE ORAL at 05:21

## 2023-11-08 NOTE — ED ADULT NURSE REASSESSMENT NOTE - NS ED NURSE REASSESS COMMENT FT1
Assumed care of pt at 19:20 as stated in report from CARLA Rodney. Charting as noted. Patient A&O x4, pt denies pain/discomfort, denies CP/SOB. Updated on the plan of care. Call bell within reach, bed locked in lowest position. IV site flushed w/ NS. No redness, swelling or pain noted to site. Pt remains on CM in NSR. NAD. Awaiting MRI.

## 2023-11-08 NOTE — ED ADULT NURSE REASSESSMENT NOTE - NS ED NURSE REASSESS COMMENT FT1
Pt is a&o x4 and resting comfortably in stretcher; breathing equal and unlabored. pt is awaiting for cardiology to come in AM. pt is on cardiac monitor reading 88 bpm in sinus rhythm and  of 100% on room air. pt bed is locked and in lowest position,

## 2023-11-08 NOTE — ED ADULT NURSE REASSESSMENT NOTE - NS ED NURSE REASSESS COMMENT FT1
Assumed care of pt at 1118 as stated in report from RN ANGELA. Charting as noted. Patient A&O x4, denies pain/discomfort, denies CP/SOB. Updated on the plan of care. Call bell within reach, bed locked in lowest position. IV site flushed w/ NS. No redness, swelling or pain noted to site. No signs of acute distress noted, safety maintained. Pt remains on CM in NSR.

## 2023-11-08 NOTE — CONSULT NOTE ADULT - SUBJECTIVE AND OBJECTIVE BOX
SAMEER CARRASCO  [id number]      HPI:  72 y/o female PMHx HTN, DM, HLD, CAD p/w dizziness.  Patient reports she was on a Zoom call when he suddenly developed significant dizziness.  The dizziness persisted and she came to the ER.  The symptoms resolved in the ER.  Patient denies LOC or any other associated symptoms.  She reports similar episodes occurring approximately every other week for some time.  This episode was the most severe and long lasting.  Patient denies any room spinning but she did have nausea.  Denies CP, SOB, palps.      ALLERGIES:  adhesives (Other)  iodinated radiocontrast agents (Rash; Urticaria)  latex (Blisters; Urticaria)      PAST MEDICAL & SURGICAL HISTORY:  Calculus of kidney  Fibroid, uterine  Carotid artery plaque, bilateral  Malignant neoplasm of rectosigmoid (colon)  H/O: hysterectomy  History of   S/P laparoscopic cholecystectomy  H/O lithotripsy  Otherwise, as noted above      MEDICATIONS (HOME):  Home Medications:  aspirin 81 mg oral tablet: orally once a day (2023 17:44)  atorvastatin 40 mg oral tablet: 1 tab(s) orally once a day (2023 12:55)  carvedilol 6.25 mg oral tablet: 1 tab(s) orally 2 times a day (2023 17:44)  hydrALAZINE 25 mg oral tablet: 1 tab(s) orally 2 times a day (2023 17:44)  losartan-hydrochlorothiazide 100 mg-25 mg oral tablet: 1 tab(s) orally once a day (2023 17:44)  Norvasc 10 mg oral tablet: 1 tab(s) orally once a day (2023 17:44)    SOCIAL HISTORY:  Patient denies alcohol, tobacco, drug use    FAMILY HISTORY:  FH: diabetes mellitus      ROS:  Patient denies cough, and other than noted above full ROS is unremarkable      PHYSICAL EXAM:  Vital Signs Last 24 Hrs  T(C): 36.7 (2023 09:45), Max: 36.7 (2023 23:52)  T(F): 98.1 (2023 09:45), Max: 98.1 (2023 09:45)  HR: 56 (2023 09:45) (56 - 66)  BP: 122/56 (2023 09:45) (122/56 - 170/77)  RR: 16 (2023 09:45) (16 - 18)  SpO2: 97% (2023 09:45) (97% - 97%)  General: Patient comfortable in NAD  HEENT: NCAT, mmm, EOMI  Neck: no JVD, no carotid bruits  CVS: nl s1, split s2, no s3, +s4, +2/6 KARL, RRR  Chest: CTA b/l  Abdomen: soft, nt/nd  Extremities: No c/c/e  Neuro: A&O x3  Psych: Normal affect      ECG:  SR with lateral TWI.  Unchanged from prior.      LABS:                        12.5   10.06 )-----------( 371      ( 2023 14:32 )             37.2         135  |  97  |  19.2  ----------------------------<  99  4.1   |  26.0  |  1.33<H>    Ca    9.8      2023 14:32  Mg     2.3         TPro  7.9  /  Alb  4.4  /  TBili  0.4  /  DBili  x   /  AST  20  /  ALT  12  /  AlkPhos  141<H>                RADIOLOGY:  Carotid Doppler:  Mild plaque b/l with no significant stenosis    CXR:  Clear    Echo:   1. Left ventricular ejection fraction, by visual estimation, is 70 to 75%.   2. Hyperdynamic global left ventricular systolic function.   3. Normal left ventricular internal cavity size.   4. Normal right ventricular size and function.   5. Normal left atrial size.   6. Normal right atrial size.   7. Mild mitral annular calcification.   8. Trace mitral valve regurgitation.   9. Sclerotic aortic valve with normal opening.  10. There is no evidence of pericardial effusion.      Assessment:  72 y/o female PMHx HTN, DM, HLD, CAD p/w dizziness.  Patient reports she was on a Zoom call when he suddenly developed significant dizziness.  Patient denies LOC or any other associated symptoms.  She reports similar episodes occurring approximately every other week for some time.  This episode was the most severe and long lasting.  May be vertigo but not clear.  Tele all unremarkable  EKG SR with TWI as seen previously.  Echo with preserved EF and no significant VHD.  Carotid with no significant dz.  MRI head pending.  More likely neuro or ENT cause of symptoms.    Plan:  1. No additional CV testing now.  2. Would keep on tele while here.  3. Continue current CV meds at current doses as PTA.  4. F/u MRI brain.  5. Stable for d/c from CV standpoint.  OP f/u with Dr. Jackson to consider additional monitoring.    Will not actively follow.  Please call with additional questions.  Thanks!

## 2023-11-08 NOTE — ED ADULT NURSE REASSESSMENT NOTE - NS ED NURSE REASSESS COMMENT FT1
Pt is a&o x4 and resting comfortably in stretcher. pt is aware of plan of care. pt is on cardiac monitor at 68 bpm in sinus rhythm and  at 99% on room air. pt bed is locked and in lowest position; breathing equal and unlabored.

## 2023-11-08 NOTE — ED CDU PROVIDER SUBSEQUENT DAY NOTE - ATTENDING APP SHARED VISIT CONTRIBUTION OF CARE
I, Lakhwinder Schwartz, have personally performed a face to face diagnostic evaluation on this patient. I have reviewed the AMADO note and agree with the history, exam and plan of care, except as noted.    no acute event overnight. TTE performed. pending cards eval.

## 2023-11-09 VITALS
SYSTOLIC BLOOD PRESSURE: 110 MMHG | OXYGEN SATURATION: 98 % | RESPIRATION RATE: 19 BRPM | DIASTOLIC BLOOD PRESSURE: 55 MMHG | TEMPERATURE: 98 F | HEART RATE: 59 BPM

## 2023-11-09 LAB
GLUCOSE BLDC GLUCOMTR-MCNC: 129 MG/DL — HIGH (ref 70–99)
GLUCOSE BLDC GLUCOMTR-MCNC: 129 MG/DL — HIGH (ref 70–99)

## 2023-11-09 PROCEDURE — 87086 URINE CULTURE/COLONY COUNT: CPT

## 2023-11-09 PROCEDURE — 83880 ASSAY OF NATRIURETIC PEPTIDE: CPT

## 2023-11-09 PROCEDURE — 70551 MRI BRAIN STEM W/O DYE: CPT | Mod: MA

## 2023-11-09 PROCEDURE — 71045 X-RAY EXAM CHEST 1 VIEW: CPT

## 2023-11-09 PROCEDURE — 99239 HOSP IP/OBS DSCHRG MGMT >30: CPT

## 2023-11-09 PROCEDURE — 81001 URINALYSIS AUTO W/SCOPE: CPT

## 2023-11-09 PROCEDURE — 83735 ASSAY OF MAGNESIUM: CPT

## 2023-11-09 PROCEDURE — 84484 ASSAY OF TROPONIN QUANT: CPT

## 2023-11-09 PROCEDURE — 83036 HEMOGLOBIN GLYCOSYLATED A1C: CPT

## 2023-11-09 PROCEDURE — 85025 COMPLETE CBC W/AUTO DIFF WBC: CPT

## 2023-11-09 PROCEDURE — 80053 COMPREHEN METABOLIC PANEL: CPT

## 2023-11-09 PROCEDURE — G0378: CPT

## 2023-11-09 PROCEDURE — 93306 TTE W/DOPPLER COMPLETE: CPT

## 2023-11-09 PROCEDURE — 93005 ELECTROCARDIOGRAM TRACING: CPT

## 2023-11-09 PROCEDURE — 36415 COLL VENOUS BLD VENIPUNCTURE: CPT

## 2023-11-09 PROCEDURE — 70551 MRI BRAIN STEM W/O DYE: CPT | Mod: 26,MA

## 2023-11-09 PROCEDURE — 93880 EXTRACRANIAL BILAT STUDY: CPT

## 2023-11-09 PROCEDURE — 80061 LIPID PANEL: CPT

## 2023-11-09 PROCEDURE — 82962 GLUCOSE BLOOD TEST: CPT

## 2023-11-09 PROCEDURE — 99285 EMERGENCY DEPT VISIT HI MDM: CPT | Mod: 25

## 2023-11-09 RX ORDER — MECLIZINE HCL 12.5 MG
1 TABLET ORAL
Qty: 15 | Refills: 0
Start: 2023-11-09 | End: 2023-11-13

## 2023-11-09 RX ADMIN — Medication 12.5 MILLIGRAM(S): at 00:47

## 2023-11-09 RX ADMIN — LOSARTAN POTASSIUM 100 MILLIGRAM(S): 100 TABLET, FILM COATED ORAL at 05:42

## 2023-11-09 RX ADMIN — Medication 12.5 MILLIGRAM(S): at 05:41

## 2023-11-09 RX ADMIN — AMLODIPINE BESYLATE 10 MILLIGRAM(S): 2.5 TABLET ORAL at 05:41

## 2023-11-09 RX ADMIN — Medication 25 MILLIGRAM(S): at 05:41

## 2023-11-09 RX ADMIN — Medication 650 MILLIGRAM(S): at 00:44

## 2023-11-09 RX ADMIN — CARVEDILOL PHOSPHATE 6.25 MILLIGRAM(S): 80 CAPSULE, EXTENDED RELEASE ORAL at 05:41

## 2023-11-09 NOTE — ED CDU PROVIDER DISPOSITION NOTE - PATIENT PORTAL LINK FT
You can access the FollowMyHealth Patient Portal offered by [facility] by registering at the following website: [email or web address]. By joining [facility]’s FollowMyHealth portal, you will also be able to view your health information using other applications (apps) compatible with our system.

## 2023-11-09 NOTE — ED ADULT NURSE REASSESSMENT NOTE - NS ED NURSE REASSESS COMMENT FT1
Assumed care of pt at 0530 as stated in report from CARLA Choi. Charting as noted. Patient A&O x4, denies pain/discomfort, denies CP/SOB. Updated on the plan of care. Call bell within reach, bed locked in lowest position. IV site flushed w/ NS. No redness, swelling or pain noted to site. No signs of acute distress noted, safety maintained. Pt remains on CM in NSR.

## 2023-11-09 NOTE — ED CDU PROVIDER DISPOSITION NOTE - NSFOLLOWUPINSTRUCTIONS_ED_ALL_ED_FT
Dizziness    Dizziness can manifest as a feeling of unsteadiness or light-headedness. You may feel like you are about to faint. This condition can be caused by a number of things, including medicines, dehydration, or illness. Drink enough fluid to keep your urine clear or pale yellow. Do not drink alcohol and limit your caffeine intake. Avoid quick or sudden movements.  Rise slowly from chairs and steady yourself until you feel okay. In the morning, first sit up on the side of the bed.    SEEK IMMEDIATE MEDICAL CARE IF YOU HAVE ANY OF THE FOLLOWING SYMPTOMS: vomiting, changes in your vision or speech, weakness in your arms or legs, trouble speaking or swallowing, chest pain, abdominal pain, shortness of breath, sweating, bleeding, headache, neck pain, or fever.    Syncope    Syncope is when you temporarily lose consciousness, also called fainting or passing out. It is caused by a sudden decrease in blood flow to the brain. Even though most causes of syncope are not dangerous, syncope can possibly be a sign of a serious medical problem. Signs that you may be about to faint include feeling dizzy, lightheaded, nausea, visual changes, or cold/clammy skin. Do not drive, operate heavy machinery, or play sports until your health care provider says it is okay.    SEEK IMMEDIATE MEDICAL CARE IF YOU HAVE ANY OF THE FOLLOWING SYMPTOMS: severe headache, pain in your chest/abdomen/back, bleeding from your mouth or rectum, palpitations, shortness of breath, pain with breathing, seizure, confusion, or trouble walking.

## 2023-11-09 NOTE — ED CDU PROVIDER SUBSEQUENT DAY NOTE - HISTORY
Pt resting comfortably at time of re-assessment. No events overnight. Pending  MR head. Will continue to monitor.
No events. No pertinent interval history. Vital signs remained stable overnight. Received no calls by RN overnight.

## 2023-11-09 NOTE — ED CDU PROVIDER DISPOSITION NOTE - ATTENDING CONTRIBUTION TO CARE
I agree with the PA's note and was available for any issues/concerns. I was directly involved in patient care. My brief overall assessment is as follows:    labs, diagnostic imaging results and cardiology assessment reviewed with patient; symptoms resolved; patient feels comfortable with discharge and medical plan; PMD or clinic follow up recommended for reassessment. Patient is aware of signs/symptoms to return to the emergency department.

## 2023-11-09 NOTE — ED CDU PROVIDER DISPOSITION NOTE - CARE PROVIDERS DIRECT ADDRESSES
,[email or web address],DirectAddress_Unknown

## 2023-11-09 NOTE — ED CDU PROVIDER SUBSEQUENT DAY NOTE - CLINICAL SUMMARY MEDICAL DECISION MAKING FREE TEXT BOX
74 yo female PMHx HTN, DM, CAD w/ 1 stent, colon CA s/p resection, HLD presenting with near syncopal episode while on zoom call. Reporting experiencing similar episodes over the past few months.   Cardiology cleared, TTE negative, Carotid US no acute pathology   Pending MR head, re-eval.
74 yo female PMHx HTN, DM, CAD w/ 1 stent, colon CA s/p resection, HLD presenting with near syncopal episode while on zoom call. Reporting experiencing similar episodes over the past few months. Cardiology contacted, recommended TTE which was negative. Pending AM evaluation.

## 2023-11-09 NOTE — ED CDU PROVIDER SUBSEQUENT DAY NOTE - NSICDXPASTSURGICALHX_GEN_ALL_CORE_FT
PAST SURGICAL HISTORY:  Coronary stent patent 2014   LOY X1    H/O lithotripsy 18  Right Ureteroscopy/ Laser Lithotripsy/ Stent Insertion  2018 s/p left PCNL    H/O: hysterectomy     MITCHELL and unilateral SO ( benign)    History of  x2     and     S/P laparoscopic cholecystectomy 2001    
PAST SURGICAL HISTORY:  Coronary stent patent 2014   LOY X1    H/O lithotripsy 18  Right Ureteroscopy/ Laser Lithotripsy/ Stent Insertion  2018 s/p left PCNL    H/O: hysterectomy     MITCHELL and unilateral SO ( benign)    History of  x2     and     S/P laparoscopic cholecystectomy 2001

## 2023-11-09 NOTE — ED CDU PROVIDER DISPOSITION NOTE - PROVIDER TOKENS
PROVIDER:[TOKEN:[[id number]]],PROVIDER:[TOKEN:[[id number]]] PROVIDER:[TOKEN:[[id number]]],PROVIDER:[TOKEN:[[id number]]],PROVIDER:[TOKEN:[[id number]]]

## 2023-11-09 NOTE — ED CDU PROVIDER DISPOSITION NOTE - CARE PROVIDER_API CALL
Bennett Kerr  Cardiology  [address] [place] [address]  Phone: [phone]  Fax: [phone]  Follow Up Time:     Apolinar Navas  Neurology  [address] [place] [address], [facility] [address]  [place] [address]  Phone: [phone]  Fax: [phone]  Follow Up Time:    Bennett Kerr  Cardiology  [address] [place] [address]  Phone: [phone]  Fax: [phone]  Follow Up Time:     Apolinar Navas  Neurology  [address], [address]  [place] [address]  Phone: [phone]  Fax: [phone]  Follow Up Time:     Corby Jackson  Cardiology  [address], [address]  [place] [address]  Phone: [phone]  Fax: [phone]  Follow Up Time:

## 2023-11-09 NOTE — ED ADULT NURSE REASSESSMENT NOTE - NS ED NURSE REASSESS COMMENT FT1
Assumed care of pt at 07:15 as stated in report from CARLA JONES. Charting as noted. Patient A&O x4, denies pain/discomfort, denies CP/SOB. Updated on the plan of care. Call bell within reach, bed locked in lowest position. IV site flushed w/ NS. No redness, swelling or pain noted to site. No signs of acute distress noted, safety maintained. Pt remains on CM in NSR.

## 2023-11-09 NOTE — ED CDU PROVIDER DISPOSITION NOTE - CLINICAL COURSE
Patient p/w dizziness, seen by cardiology reccomended and echo reviewed stable for outpatient follow up.  Continued to c/o dizziness, MRI shows no infarct, only white matter disease.  Stable for outpatient follow up with ENT and neurology.  Given copies of all results, understands and agrees to proceed.  RX Meclazine and RX Vestibular clinic follow up.

## 2023-11-09 NOTE — ED CDU PROVIDER SUBSEQUENT DAY NOTE - PHYSICAL EXAMINATION
Gen: No acute distress, non toxic  Head: NCAT  Eyes: pink conjunctivae, EOMI, PERRL  CV: RRR, nl s1/s2.  Resp: CTAB, normal rate and effort  GI: Abdomen soft, NT, ND. No rebound, no guarding  : No CVAT  Neuro: A&O x 3, sensorimotor intact without deficits   MSK: No spine or joint tenderness to palpation, Full ROM ext x 4  Skin: No rashes. intact and perfused
Gen: No acute distress, non toxic  Head: NCAT  Eyes: pink conjunctivae, EOMI, PERRL  CV: RRR, nl s1/s2.  Resp: CTAB, normal rate and effort  GI: Abdomen soft, NT, ND. No rebound, no guarding  : No CVAT  Neuro: A&O x 3, sensorimotor intact without deficits   MSK: No spine or joint tenderness to palpation, Full ROM ext x 4  Skin: No rashes. intact and perfused.

## 2023-12-21 NOTE — ED CDU PROVIDER SUBSEQUENT DAY NOTE - NSICDXPASTMEDICALHX_GEN_ALL_CORE_FT
In an effort to ensure that our patients LiveWell, a Team Member has reviewed your chart and identified an opportunity to provide the best care possible. An attempt was made to discuss or schedule overdue Preventive or Disease Management screening.     The Outcome was Contact was not made, no answer/busyIf you have any questions or need help with scheduling, contact your primary care provider.. Care Gaps include Colorectal Cancer Screening.    
PAST MEDICAL HISTORY:  CAD (coronary artery disease) s/p cardiac stent 2014 X 1 ( LOY) @ [facility]    Calculus of kidney b/l    Cardiac murmur     Carotid artery plaque, bilateral mild    COVID-19 april 2020    DM2 (diabetes mellitus, type 2) Off Metformin since '2017. Now diet- managed    Fibroid, uterine ' 88   surgically treated; benign    Heart murmur mild    Hyperlipidemia     Hypertension     Latex allergy     Malignant neoplasm of rectosigmoid (colon)     Morbid obesity BMI  42.4    Stented coronary artery     
PAST MEDICAL HISTORY:  CAD (coronary artery disease) s/p cardiac stent 2014 X 1 ( LOY) @ [facility]    Calculus of kidney b/l    Cardiac murmur     Carotid artery plaque, bilateral mild    COVID-19 april 2020    DM2 (diabetes mellitus, type 2) Off Metformin since '2017. Now diet- managed    Fibroid, uterine ' 88   surgically treated; benign    Heart murmur mild    Hyperlipidemia     Hypertension     Latex allergy     Malignant neoplasm of rectosigmoid (colon)     Morbid obesity BMI  42.4    Stented coronary artery

## 2025-03-27 NOTE — ED ADULT TRIAGE NOTE - BEFAST ARM NUMBNESS
Add 52 Modifier (Optional): no
Medical Necessity Information: It is in your best interest to select a reason for this procedure from the list below. All of these items fulfill various CMS LCD requirements except the new and changing color options.
Consent: The patient's consent was obtained including but not limited to risks of crusting, scabbing, blistering, scarring, darker or lighter pigmentary change, recurrence, incomplete removal and infection.
Show Topical Anesthesia Variable?: Yes
Detail Level: Detailed
Post-Care Instructions: I reviewed with the patient in detail post-care instructions. Patient is to wear sunprotection, and avoid picking at any of the treated lesions. Pt may apply Vaseline to crusted or scabbing areas.
Spray Paint Text: The liquid nitrogen was applied to the skin utilizing a spray paint frosting technique.
Medical Necessity Clause: This procedure was medically necessary because the lesions that were treated were:
No

## 2025-04-10 NOTE — H&P PST ADULT - NSANTHNECKRD_ENT_A_CORE
You can access the FollowMyHealth Patient Portal offered by [facility] by registering at the following website: [email or web address]. By joining [facility]’s FollowMyHealth portal, you will also be able to view your health information using other applications (apps) compatible with our system.
No